# Patient Record
Sex: FEMALE | Race: BLACK OR AFRICAN AMERICAN | Employment: OTHER | URBAN - METROPOLITAN AREA
[De-identification: names, ages, dates, MRNs, and addresses within clinical notes are randomized per-mention and may not be internally consistent; named-entity substitution may affect disease eponyms.]

---

## 2021-09-02 ENCOUNTER — OFFICE VISIT (OUTPATIENT)
Dept: FAMILY MEDICINE CLINIC | Age: 66
End: 2021-09-02
Payer: MEDICARE

## 2021-09-02 VITALS
OXYGEN SATURATION: 97 % | HEIGHT: 69 IN | WEIGHT: 202 LBS | BODY MASS INDEX: 29.92 KG/M2 | DIASTOLIC BLOOD PRESSURE: 75 MMHG | SYSTOLIC BLOOD PRESSURE: 116 MMHG | TEMPERATURE: 97.5 F | HEART RATE: 75 BPM | RESPIRATION RATE: 16 BRPM

## 2021-09-02 DIAGNOSIS — Z11.59 ENCOUNTER FOR HEPATITIS C SCREENING TEST FOR LOW RISK PATIENT: ICD-10-CM

## 2021-09-02 DIAGNOSIS — E55.9 VITAMIN D DEFICIENCY: ICD-10-CM

## 2021-09-02 DIAGNOSIS — G47.33 OBSTRUCTIVE SLEEP APNEA SYNDROME: ICD-10-CM

## 2021-09-02 DIAGNOSIS — H40.9 GLAUCOMA OF BOTH EYES, UNSPECIFIED GLAUCOMA TYPE: ICD-10-CM

## 2021-09-02 DIAGNOSIS — Z23 ENCOUNTER FOR IMMUNIZATION: ICD-10-CM

## 2021-09-02 DIAGNOSIS — F41.9 ANXIETY: ICD-10-CM

## 2021-09-02 DIAGNOSIS — F13.24: ICD-10-CM

## 2021-09-02 DIAGNOSIS — F33.1 MODERATE EPISODE OF RECURRENT MAJOR DEPRESSIVE DISORDER (HCC): ICD-10-CM

## 2021-09-02 DIAGNOSIS — K63.5 POLYP OF COLON, UNSPECIFIED PART OF COLON, UNSPECIFIED TYPE: ICD-10-CM

## 2021-09-02 DIAGNOSIS — E78.00 HYPERCHOLESTEROLEMIA: ICD-10-CM

## 2021-09-02 DIAGNOSIS — I10 ESSENTIAL HYPERTENSION: Primary | ICD-10-CM

## 2021-09-02 DIAGNOSIS — F10.90 HEAVY ALCOHOL USE: ICD-10-CM

## 2021-09-02 DIAGNOSIS — Z76.89 ENCOUNTER TO ESTABLISH CARE: ICD-10-CM

## 2021-09-02 PROBLEM — E66.9 OBESITY: Status: ACTIVE | Noted: 2021-09-02

## 2021-09-02 PROCEDURE — G8431 POS CLIN DEPRES SCRN F/U DOC: HCPCS | Performed by: FAMILY MEDICINE

## 2021-09-02 PROCEDURE — G8400 PT W/DXA NO RESULTS DOC: HCPCS | Performed by: FAMILY MEDICINE

## 2021-09-02 PROCEDURE — 99204 OFFICE O/P NEW MOD 45 MIN: CPT | Performed by: FAMILY MEDICINE

## 2021-09-02 PROCEDURE — 1101F PT FALLS ASSESS-DOCD LE1/YR: CPT | Performed by: FAMILY MEDICINE

## 2021-09-02 PROCEDURE — 96127 BRIEF EMOTIONAL/BEHAV ASSMT: CPT | Performed by: FAMILY MEDICINE

## 2021-09-02 PROCEDURE — G0008 ADMIN INFLUENZA VIRUS VAC: HCPCS | Performed by: FAMILY MEDICINE

## 2021-09-02 PROCEDURE — G0009 ADMIN PNEUMOCOCCAL VACCINE: HCPCS | Performed by: FAMILY MEDICINE

## 2021-09-02 PROCEDURE — 1090F PRES/ABSN URINE INCON ASSESS: CPT | Performed by: FAMILY MEDICINE

## 2021-09-02 PROCEDURE — G8417 CALC BMI ABV UP PARAM F/U: HCPCS | Performed by: FAMILY MEDICINE

## 2021-09-02 PROCEDURE — G8536 NO DOC ELDER MAL SCRN: HCPCS | Performed by: FAMILY MEDICINE

## 2021-09-02 PROCEDURE — 90750 HZV VACC RECOMBINANT IM: CPT | Performed by: FAMILY MEDICINE

## 2021-09-02 PROCEDURE — G8427 DOCREV CUR MEDS BY ELIG CLIN: HCPCS | Performed by: FAMILY MEDICINE

## 2021-09-02 PROCEDURE — 3017F COLORECTAL CA SCREEN DOC REV: CPT | Performed by: FAMILY MEDICINE

## 2021-09-02 PROCEDURE — G0463 HOSPITAL OUTPT CLINIC VISIT: HCPCS | Performed by: FAMILY MEDICINE

## 2021-09-02 RX ORDER — LORAZEPAM 0.5 MG/1
TABLET ORAL
COMMUNITY
End: 2021-09-02 | Stop reason: SDUPTHER

## 2021-09-02 RX ORDER — LORAZEPAM 0.5 MG/1
TABLET ORAL
Qty: 30 TABLET | Refills: 1 | Status: SHIPPED | OUTPATIENT
Start: 2021-09-02 | End: 2022-01-12

## 2021-09-02 RX ORDER — ROSUVASTATIN CALCIUM 5 MG/1
TABLET, COATED ORAL
COMMUNITY
End: 2022-05-12

## 2021-09-02 RX ORDER — ACETAMINOPHEN 500 MG
TABLET ORAL
COMMUNITY

## 2021-09-02 RX ORDER — VENLAFAXINE HYDROCHLORIDE 37.5 MG/1
37.5 CAPSULE, EXTENDED RELEASE ORAL DAILY
Qty: 30 CAPSULE | Refills: 1 | Status: SHIPPED | OUTPATIENT
Start: 2021-09-02 | End: 2021-10-04

## 2021-09-02 RX ORDER — LATANOPROST 50 UG/ML
SOLUTION/ DROPS OPHTHALMIC
COMMUNITY

## 2021-09-02 RX ORDER — HYDROCHLOROTHIAZIDE 25 MG/1
TABLET ORAL
COMMUNITY
End: 2022-02-14

## 2021-09-02 RX ORDER — OMEPRAZOLE 40 MG/1
CAPSULE, DELAYED RELEASE ORAL
COMMUNITY

## 2021-09-02 RX ORDER — MULTIVIT,IRON,MINERALS/LUTEIN
TABLET ORAL
COMMUNITY

## 2021-09-02 NOTE — PROGRESS NOTES
Selma Alvarado is a 73 y.o. female    Chief Complaint   Patient presents with   [facility]     new patient, [place]. used to take lexapro for depression, which helped    Medication Refill     would like her meds refilled     -has not had a pneumonia vaccine  -had 1 shingles vaccine in 2017  -always gets flu vaccine  -had mammogram in may 2021  -has a colonoscopy scheduled 9/15/21 with Dr. Steven Rocha     1. Have you been to the ER, urgent care clinic since your last visit? Hospitalized since your last visit? she is a new patient    2. Have you seen or consulted any other health care providers outside of the [facility] since your last visit? Include any pap smears or colon screening. she is a new patient      Visit Vitals  /75 (BP 1 Location: Right upper arm, BP Patient Position: Sitting, BP Cuff Size: Large adult)   Pulse 75   Temp 97.5 °F (36.4 °C) (Temporal)   Resp 16   Ht 5' 8.5\" (1.74 m)   Wt 202 lb (91.6 kg)   SpO2 97%   BMI 30.27 kg/m²           Health Maintenance Due   Topic Date Due    Hepatitis C Screening  Never done    Lipid Screen  Never done    DTaP/Tdap/Td series (1 - Tdap) Never done    Colorectal Cancer Screening Combo  Never done    Shingrix Vaccine Age 50> (1 of 2) Never done    Breast Cancer Screen Mammogram  06/14/2017    Bone Densitometry (Dexa) Screening  Never done    Pneumococcal 65+ years (1 of 1 - PPSV23) Never done    Flu Vaccine (1) 09/01/2021    Medicare Yearly Exam  08/03/2021         Medication Reconciliation completed, changes noted.   Please  Update medication list.

## 2021-09-02 NOTE — PATIENT INSTRUCTIONS
Eye Doctor Referral:   [facility]  [facility] [address], [address] [place] [address] Street  Phone: [phone]         Anxiety Disorder: Care Instructions  Your Care Instructions     Anxiety is a normal reaction to stress. Difficult situations can cause you to have symptoms such as sweaty palms and a nervous feeling. In an anxiety disorder, the symptoms are far more severe. Constant worry, muscle tension, trouble sleeping, nausea and diarrhea, and other symptoms can make normal daily activities difficult or impossible. These symptoms may occur for no reason, and they can affect your work, school, or social life. Medicines, counseling, and self-care can all help. Follow-up care is a key part of your treatment and safety. Be sure to make and go to all appointments, and call your doctor if you are having problems. It's also a good idea to know your test results and keep a list of the medicines you take. How can you care for yourself at home? · Take medicines exactly as directed. Call your doctor if you think you are having a problem with your medicine. · Go to your counseling sessions and follow-up appointments. · Recognize and accept your anxiety. Then, when you are in a situation that makes you anxious, say to yourself, \"This is not an emergency. I feel uncomfortable, but I am not in danger. I can keep going even if I feel anxious. \"  · Be kind to your body:  ? Relieve tension with exercise or a massage. ? Get enough rest.  ? Avoid alcohol, caffeine, nicotine, and illegal drugs. They can increase your anxiety level and cause sleep problems. ? Learn and do relaxation techniques. See below for more about these techniques. · Engage your mind. Get out and do something you enjoy. Go to a funny movie, or take a walk or hike. Plan your day. Having too much or too little to do can make you anxious. · Keep a record of your symptoms.  Discuss your fears with a good friend or family member, or join a support group for people with similar problems. Talking to others sometimes relieves stress. · Get involved in social groups, or volunteer to help others. Being alone sometimes makes things seem worse than they are. · Get at least 30 minutes of exercise on most days of the week to relieve stress. Walking is a good choice. You also may want to do other activities, such as running, swimming, cycling, or playing tennis or team sports. Relaxation techniques  Do relaxation exercises 10 to 20 minutes a day. You can play soothing, relaxing music while you do them, if you wish. · Tell others in your house that you are going to do your relaxation exercises. Ask them not to disturb you. · Find a comfortable place, away from all distractions and noise. · Lie down on your back, or sit with your back straight. · Focus on your breathing. Make it slow and steady. · Breathe in through your nose. Breathe out through either your nose or mouth. · Breathe deeply, filling up the area between your navel and your rib cage. Breathe so that your belly goes up and down. · Do not hold your breath. · Breathe like this for 5 to 10 minutes. Notice the feeling of calmness throughout your whole body. As you continue to breathe slowly and deeply, relax by doing the following for another 5 to 10 minutes:  · Tighten and relax each muscle group in your body. You can begin at your toes and work your way up to your head. · Imagine your muscle groups relaxing and becoming heavy. · Empty your mind of all thoughts. · Let yourself relax more and more deeply. · Become aware of the state of calmness that surrounds you. · When your relaxation time is over, you can bring yourself back to alertness by moving your fingers and toes and then your hands and feet and then stretching and moving your entire body. Sometimes people fall asleep during relaxation, but they usually wake up shortly afterward.   · Always give yourself time to return to full alertness before you drive a car or do anything that might cause an accident if you are not fully alert. Never play a relaxation tape while you drive a car. When should you call for help? Call 911 anytime you think you may need emergency care. For example, call if:    · You feel you cannot stop from hurting yourself or someone else. Keep the numbers for these national suicide hotlines: 2-806-244-TALK (7-432.224.4185) and 6-139-MEXPJCH (8-656.477.2835). If you or someone you know talks about suicide or feeling hopeless, get help right away. Watch closely for changes in your health, and be sure to contact your doctor if:    · You have anxiety or fear that affects your life.     · You have symptoms of anxiety that are new or different from those you had before. Where can you learn more? Go to http://www.smith.com/  Enter P754 in the search box to learn more about \"Anxiety Disorder: Care Instructions. \"  Current as of: September 23, 2020               Content Version: 12.8  © 2596-7028 [employer]. Care instructions adapted under license by [employer] (which disclaims liability or warranty for this information). If you have questions about a medical condition or this instruction, always ask your healthcare professional. [address] any warranty or liability for your use of this information. 9 Ways to Cut Back on Drinking  Maybe you've found yourself drinking more alcohol than you'd prefer. If you want to cut back, here are some ideas to try. Think before you drink. Do you really want a drink, or is it just a habit? If you're used to having a drink at a certain time, try doing something else then. Look for substitutes. Find some no-alcohol drinks that you enjoy, like flavored seltzer water, tea with honey, or tonic with a slice of lime. Or try alcohol-free beer or \"virgin\" cocktails (without the alcohol). Drink more water.   Use water to quench your thirst. Drink a glass of water before you have any alcohol. Have another glass along with every drink or between drinks. Shrink your drink. For example, have a bottle of beer instead of a pint. Use a smaller glass for wine. Choose drinks with lower alcohol content (ABV%). Or use less liquor and more mixer in cocktails. Slow down. It's easy to drink quickly and without thinking about it. Pay attention, and make each drink last longer. Do the math. Total up how much you spend on alcohol each month. How much is that a year? If you cut back, what could you do with the money you save? Take a break. Choose a day or two each week when you won't drink at all. Notice how you feel on those days, physically and emotionally. How did you sleep? Do you feel better? Over time, add more break days. Count calories. Would you like to lose some weight? That can be a good motivator for cutting back. Figure out how many calories are in each drink. How many does that add up to in a day? In a week? In a month? Practice saying no. Be ready when someone offers you a drink. Try: Alfonso Aragon, I've had enough. \" Or \"Thanks, but I'm cutting back. \" Or \"No, thanks. I feel better when I drink less. \"   Current as of: December 1, 2020               Content Version: 12.8  © 2006-2021 [employer], [facility]. Care instructions adapted under license by [employer] (which disclaims liability or warranty for this information). If you have questions about a medical condition or this instruction, always ask your healthcare professional. [address] any warranty or liability for your use of this information.

## 2021-09-02 NOTE — PROGRESS NOTES
History of Present Illness:     Chief Complaint   Patient presents with   [facility] Establish Care     new patient, [place]. used to take lexapro for depression, which helped    Medication Refill     would like her meds refilled         Pt is a 73y.o. year old female    Presents to clinic to establish care. Recently moved from out of [place], [place]  Family is in the area  Needs meds refilled     Current medical conditions:  - HTN. Taking HCTZ. Always good at home.     - HLD. Taking Crestor daily.    - Anxiety/ depression. Would like referral for therapy. Drinks 2-3 drinks per day. Reports it comes in waves, coming off of a 2 week stretch of depression. Takes Lorazepam and needs a refill. Denies SI or HI. But is very angry and has thoughts of hurting people at times. Family issues - she had custody of her grandson, who is now with his father, in custody pedro with him. - Glaucoma. Has appointment to establish with eye doctor in February 2022.     - Neuropathy in feet. - CHERISE. Since losing weight, not needing CPAP. - Heavy EtOH use. Drinks about 2-3 drinks per night. Some nights she does not but most nights of the week she drinks.      HM:  - Colonoscopy scheduled with [facility] (Dr. Lossie Schilder) this month  - Mammogram done this year  - 1/2 Shingrix vaccine  - Due for Pneumonia vaccine  -  Bone density scan done previously per patient    3 most recent PHQ Screens 9/2/2021   Little interest or pleasure in doing things More than half the days   Feeling down, depressed, irritable, or hopeless More than half the days   Total Score PHQ 2 4   Trouble falling or staying asleep, or sleeping too much Not at all   Feeling tired or having little energy Several days   Poor appetite, weight loss, or overeating Several days   Feeling bad about yourself - or that you are a failure or have let yourself or your family down More than half the days   Trouble concentrating on things such as school, work, reading, or watching TV Not at all   Moving or speaking so slowly that other people could have noticed; or the opposite being so fidgety that others notice Several days   Thoughts of being better off dead, or hurting yourself in some way Several days   PHQ 9 Score 10   How difficult have these problems made it for you to do your work, take care of your home and get along with others Somewhat difficult     MAKENZIE 2/7 9/2/2021   Feeling nervous, anxious, or on edge 3   Not being able to stop or control worrying 2   Worrying too much about different things 3   Trouble relaxing 3   Being so restless that it is hard to sit still 3   Becoming easily annoyed or irritable 3   Feeling afraid as if something awful might happen 1   MAKENZIE-7 Total Score 18       I have reviewed patient's history as detailed below:    Past Medical History:   Diagnosis Date    Anxiety     Arthritis     Colon polyps     Depression     Diverticular disease     Glaucoma     Heart murmur     Hemorrhoids     Hypercholesterolemia     Hypertension     Neuropathy     Pineal gland cyst     2 cm    Sleep apnea          Past Surgical History:   Procedure Laterality Date    HX CARPAL TUNNEL RELEASE      HX CATARACT REMOVAL      HX HYSTERECTOMY      HX ORTHOPAEDIC      HX ROTATOR CUFF REPAIR           Family History   Problem Relation Age of Onset    Cancer Mother     Neuropathy Mother     Glaucoma Mother     Hypertension Mother     Cancer Father     Liver Disease Father     Neuropathy Father     Hypertension Father     Breast Cancer Sister     Hypertension Sister     Heart Disease Brother     Hypertension Brother          Social History     Socioeconomic History    Marital status:      Spouse name: Not on file    Number of children: Not on file    Years of education: Not on file    Highest education level: Not on file   Occupational History    Not on file   Tobacco Use    Smoking status: Never Smoker    Smokeless tobacco: Never Used Vaping Use    Vaping Use: Never used   Substance and Sexual Activity    Alcohol use: Yes     Comment: daily over the past 5 months. 2-3 drinks a day    Drug use: Never    Sexual activity: Not Currently     Partners: Male     Birth control/protection: None   Other Topics Concern    Not on file   Social History Narrative    Not on file     Social Determinants of Health     Financial Resource Strain:     Difficulty of Paying Living Expenses:    Food Insecurity:     Worried About Running Out of Food in the Last Year:     [address] in the Last Year:    Transportation Needs:     Lack of Transportation (Medical):      Lack of Transportation (Non-Medical):    Physical Activity:     Days of Exercise per Week:     Minutes of Exercise per Session:    Stress:     Feeling of Stress :    Social Connections:     Frequency of Communication with Friends and Family:     Frequency of Social Gatherings with Friends and Family:     Attends Cheondo[employer] Services:     Active Member of Clubs or Organizations:     Attends Club or Organization Meetings:     Marital Status:    Intimate Partner Violence:     Fear of Current or Ex-Partner:     Emotionally Abused:     Physically Abused:     Sexually Abused:          Current Outpatient Medications on File Prior to Visit   Medication Sig Dispense Refill    LORazepam (ATIVAN) 0.5 mg tablet lorazepam 0.5 mg tablet   TAKE 1 TABLET BY MOUTH EVERY DAY AS NEEDED      latanoprost (XALATAN) 0.005 % ophthalmic solution latanoprost 0.005 % eye drops   INSTILL 1 DROP INTO BOTH EYES AT BEDTIME AS DIRECTED      hydroCHLOROthiazide (HYDRODIURIL) 25 mg tablet hydrochlorothiazide 25 mg tablet   TAKE 1 TABLET BY MOUTH EVERY DAY      rosuvastatin (CRESTOR) 5 mg tablet rosuvastatin 5 mg tablet   TAKE 1 TABLET BY MOUTH EVERY DAY      omeprazole (PRILOSEC) 40 mg capsule omeprazole 40 mg capsule,delayed release   TAKE 1 CAPSULE BY MOUTH EVERY DAY      cholecalciferol (VITAMIN D3) (2,000 UNITS /50 MCG) cap capsule cholecalciferol (vitamin D3) 50 mcg (2,000 unit) capsule   TAKE 1 CAPSULE BY MOUTH EVERY DAY      multivit-min-iron-FA-lutein (Centrum Silver Women) 8 mg iron-400 mcg-300 mcg tab Take  by mouth. No current facility-administered medications on file prior to visit. Allergies: Allergies   Allergen Reactions    Lisinopril Itching    Penicillins Hives, Itching and Swelling    Sulfa (Sulfonamide Antibiotics) Itching         Review of systems:    Review of Systems   Constitutional: Negative for chills and fever. Eyes: Positive for blurred vision. Respiratory: Negative for cough and shortness of breath. Cardiovascular: Negative for chest pain, palpitations and leg swelling. Neurological: Positive for tingling. Psychiatric/Behavioral: Positive for depression and substance abuse. Negative for suicidal ideas. The patient is nervous/anxious. The patient does not have insomnia. Objective:     Vitals:    09/02/21 1332   BP: 116/75   Pulse: 75   Resp: 16   Temp: 97.5 °F (36.4 °C)   TempSrc: Temporal   SpO2: 97%   Weight: 202 lb (91.6 kg)   Height: 5' 8.5\" (1.74 m)       Physical Exam:  General appearance - alert, well appearing, and in no distress and overweight  Mental status - alert, oriented to person, place, and time, depressed mood, affect appropriate to mood  Neck - supple, no significant adenopathy, thyroid exam: thyroid is normal in size without nodules or tenderness  Chest - clear to auscultation, no wheezes, rales or rhonchi, symmetric air entry  Heart - normal rate, regular rhythm, normal S1, S2, no murmurs, rubs, clicks or gallops  Neurological - alert, oriented, normal speech, no focal findings or movement disorder noted  Extremities - peripheral pulses normal, no pedal edema, no clubbing or cyanosis      Recent Labs:  No results found for this or any previous visit (from the past 12 hour(s)).       Assessment and Plan:   Pt is a 73y.o. year old female,      ICD-10-CM ICD-9-CM    1. Essential hypertension  I10 401.9    2. Obstructive sleep apnea syndrome  G47.33 327.23    3. Hypercholesterolemia  E78.00 272.0    4. Glaucoma of both eyes, unspecified glaucoma type  H40.9 365.9    5. Moderate episode of recurrent major depressive disorder (HCC)  F33.1 296.32    6. Anxiety  F41.9 300.00    7. Polyp of colon, unspecified part of colon, unspecified type  K63.5 211.3    8. Encounter to establish care  Z76.89 V65.8        HTN, well controlled    CHERISE, no longer using CPAP since weight loss    HLD, continue Statin    Glaucoma, visit with Ophtho planned but will see if she can get in with [facility] sooner    MDD, see above for PHQ and MAKENZIE,start Effexor, she mentioned she has issues with hot flashes, so Effexor may benefit that as well. Anxiety with anxiolytic dependence, Effexor as noted above, will refill Ativan 0.5mg daily with goal of weaning to PRN use which she had done previously.  reviewed and is appropriate. Colon polyp, scheduled for colonoscopy this month    Vit D def, cont daily supplement, check labs    Heavy EtOH use,counseled on safe levels for women and importance of cutting back. Pt desires to cut back and admits she is using it to cope. Given handout for counselors in the area as well. Follow up in 4-6 weeks for follow up/ Alena Garcia MD      I have discussed the diagnosis with the patient and the intended plan as seen in the above orders. The patient has received an after-visit summary and questions were answered concerning future plans.

## 2021-09-03 LAB
25(OH)D3 SERPL-MCNC: 47.2 NG/ML (ref 30–100)
ALBUMIN SERPL-MCNC: 4.1 G/DL (ref 3.5–5)
ALBUMIN/GLOB SERPL: 1.2 {RATIO} (ref 1.1–2.2)
ALP SERPL-CCNC: 61 U/L (ref 45–117)
ALT SERPL-CCNC: 45 U/L (ref 12–78)
ANION GAP SERPL CALC-SCNC: 1 MMOL/L (ref 5–15)
AST SERPL-CCNC: 27 U/L (ref 15–37)
BILIRUB SERPL-MCNC: 0.3 MG/DL (ref 0.2–1)
BUN SERPL-MCNC: 12 MG/DL (ref 6–20)
BUN/CREAT SERPL: 14 (ref 12–20)
CALCIUM SERPL-MCNC: 10.1 MG/DL (ref 8.5–10.1)
CHLORIDE SERPL-SCNC: 107 MMOL/L (ref 97–108)
CHOLEST SERPL-MCNC: 205 MG/DL
CO2 SERPL-SCNC: 32 MMOL/L (ref 21–32)
CREAT SERPL-MCNC: 0.86 MG/DL (ref 0.55–1.02)
ERYTHROCYTE [DISTWIDTH] IN BLOOD BY AUTOMATED COUNT: 14.6 % (ref 11.5–14.5)
GLOBULIN SER CALC-MCNC: 3.3 G/DL (ref 2–4)
GLUCOSE SERPL-MCNC: 98 MG/DL (ref 65–100)
HCT VFR BLD AUTO: 39.6 % (ref 35–47)
HCV AB SERPL QL IA: NONREACTIVE
HDLC SERPL-MCNC: 74 MG/DL
HDLC SERPL: 2.8 {RATIO} (ref 0–5)
HGB BLD-MCNC: 12 G/DL (ref 11.5–16)
LDLC SERPL CALC-MCNC: 111.4 MG/DL (ref 0–100)
MCH RBC QN AUTO: 25.2 PG (ref 26–34)
MCHC RBC AUTO-ENTMCNC: 30.3 G/DL (ref 30–36.5)
MCV RBC AUTO: 83 FL (ref 80–99)
NRBC # BLD: 0 K/UL (ref 0–0.01)
NRBC BLD-RTO: 0 PER 100 WBC
PLATELET # BLD AUTO: 326 K/UL (ref 150–400)
PMV BLD AUTO: 10.4 FL (ref 8.9–12.9)
POTASSIUM SERPL-SCNC: 3.4 MMOL/L (ref 3.5–5.1)
PROT SERPL-MCNC: 7.4 G/DL (ref 6.4–8.2)
RBC # BLD AUTO: 4.77 M/UL (ref 3.8–5.2)
SODIUM SERPL-SCNC: 140 MMOL/L (ref 136–145)
TRIGL SERPL-MCNC: 98 MG/DL (ref ?–150)
TSH SERPL DL<=0.05 MIU/L-ACNC: 1 UIU/ML (ref 0.36–3.74)
VLDLC SERPL CALC-MCNC: 19.6 MG/DL
WBC # BLD AUTO: 5.2 K/UL (ref 3.6–11)

## 2021-10-03 DIAGNOSIS — F41.9 ANXIETY: ICD-10-CM

## 2021-10-03 DIAGNOSIS — F33.1 MODERATE EPISODE OF RECURRENT MAJOR DEPRESSIVE DISORDER (HCC): ICD-10-CM

## 2021-10-04 RX ORDER — VENLAFAXINE HYDROCHLORIDE 37.5 MG/1
37.5 CAPSULE, EXTENDED RELEASE ORAL DAILY
Qty: 30 CAPSULE | Refills: 1 | Status: SHIPPED | OUTPATIENT
Start: 2021-10-04 | End: 2021-10-31 | Stop reason: SDUPTHER

## 2021-10-21 ENCOUNTER — OFFICE VISIT (OUTPATIENT)
Dept: FAMILY MEDICINE CLINIC | Age: 66
End: 2021-10-21
Payer: MEDICARE

## 2021-10-21 VITALS
TEMPERATURE: 97.5 F | SYSTOLIC BLOOD PRESSURE: 129 MMHG | DIASTOLIC BLOOD PRESSURE: 84 MMHG | BODY MASS INDEX: 30.45 KG/M2 | HEIGHT: 69 IN | OXYGEN SATURATION: 98 % | HEART RATE: 67 BPM | WEIGHT: 205.6 LBS | RESPIRATION RATE: 16 BRPM

## 2021-10-21 DIAGNOSIS — Z23 ENCOUNTER FOR IMMUNIZATION: ICD-10-CM

## 2021-10-21 DIAGNOSIS — Z71.89 ADVANCED DIRECTIVES, COUNSELING/DISCUSSION: ICD-10-CM

## 2021-10-21 DIAGNOSIS — Z78.0 POSTMENOPAUSAL STATE: ICD-10-CM

## 2021-10-21 DIAGNOSIS — E34.8 CYST OF PINEAL GLAND: ICD-10-CM

## 2021-10-21 DIAGNOSIS — Z00.00 WELCOME TO MEDICARE PREVENTIVE VISIT: Primary | ICD-10-CM

## 2021-10-21 PROCEDURE — G0403 EKG FOR INITIAL PREVENT EXAM: HCPCS | Performed by: FAMILY MEDICINE

## 2021-10-21 PROCEDURE — G0402 INITIAL PREVENTIVE EXAM: HCPCS | Performed by: FAMILY MEDICINE

## 2021-10-21 PROCEDURE — 90715 TDAP VACCINE 7 YRS/> IM: CPT | Performed by: FAMILY MEDICINE

## 2021-10-21 PROCEDURE — G8417 CALC BMI ABV UP PARAM F/U: HCPCS | Performed by: FAMILY MEDICINE

## 2021-10-21 PROCEDURE — 90471 IMMUNIZATION ADMIN: CPT | Performed by: FAMILY MEDICINE

## 2021-10-21 PROCEDURE — G8536 NO DOC ELDER MAL SCRN: HCPCS | Performed by: FAMILY MEDICINE

## 2021-10-21 PROCEDURE — G8427 DOCREV CUR MEDS BY ELIG CLIN: HCPCS | Performed by: FAMILY MEDICINE

## 2021-10-21 PROCEDURE — 1090F PRES/ABSN URINE INCON ASSESS: CPT | Performed by: FAMILY MEDICINE

## 2021-10-21 PROCEDURE — G9717 DOC PT DX DEP/BP F/U NT REQ: HCPCS | Performed by: FAMILY MEDICINE

## 2021-10-21 NOTE — PATIENT INSTRUCTIONS

## 2021-10-21 NOTE — PROGRESS NOTES
Lynne Pérez is a 73 y.o. female    General Health Questions   -During the past 4 weeks:   -how would you rate your health in general? Good   -how often have you been bothered by feeling dizzy when standing up? Not at all   -how much have you been bothered by bodily pain? mildly   -Have you noticed any hearing difficulties? Yes - recently had hearing test   -has your physical and emotional health limited your social activities with family or friends? no    Emotional Health Questions   -Do you have a history of depression, anxiety, or emotional problems? yes  -Over the past 2 weeks, have you felt down, depressed or hopeless? no  -Over the past 2 weeks, have you felt little interest or pleasure in doing things? no    Health Habits   Please describe your diet habits: usually skip breakfast, balanced lunch, balanced dinner. Metamucil in the morning & fiber bar before bed. 2-3 glasses of water/day  Do you get 5 servings of fruits or vegetables daily? no  Do you exercise regularly? yes    Activities of Daily Living and Functional Status   -Do you need help with eating, walking, dressing, bathing, toileting, the phone, transportation, shopping, preparing meals, housework, laundry, medications or managing money? no  -In the past four weeks, was someone available to help you if you needed and wanted help with anything? yes  -Are you confident are you that you can control and manage most of your health problems? yes  -Have you been given information to help you keep track of your medications? yes  -How often do you have trouble taking your medications as prescribed? never    Fall Risk and Home Safety   Have you fallen 2 or more times in the past year? no  Does your home have rugs in the hallways? no, Do you have grab bars in the bathrooms? yes, Does your home have handrails on the stairs? yes, Do you have adequate lighting in your home? yes  Do you have smoke detectors and check them regularly?  yes  Do you have difficulties driving a car/vehicle? no  Do you always fasten your seat belt when you are in a car? yes    Chief Complaint   Patient presents with    Welcome To Medicare     had colon[facility] 9[facility]15/21    Follow-up     doing well on effexor. taking less of ativan        1. Have you been to the ER, urgent care clinic since your last visit? Hospitalized since your last visit? No    2. Have you seen or consulted any other health care providers outside of the [address] [place] [address] [facility] since your last visit? Include any pap smears or colon screening. Yes - had colonoscopy done by GI in sept      Visit Vitals  /84 (BP 1 Location: Left upper arm, BP Patient Position: Sitting, BP Cuff Size: Adult)   Pulse 67   Temp 97.5 °F (36.4 °C) (Temporal)   Resp 16   Ht 5' 8.5\" (1.74 m)   Wt 205 lb 9.6 oz (93.3 kg)   SpO2 98%   BMI 30.81 kg/m²           Health Maintenance Due   Topic Date Due    Cervical cancer screen  Never done    Breast Cancer Screen Mammogram  06/14/2017    Bone Densitometry (Dexa) Screening  Never done         Medication Reconciliation completed, changes noted.   Please  Update medication list.

## 2021-10-21 NOTE — PROGRESS NOTES
Years ago had MRI for stroke. Work up for dragging of foot. 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 Only neurologic complaint is tinging/ burning in her feet bilaterally. Last MRI years ago at outside facility. Will re-eval with MRI. This is a \"Welcome to [employer]"  Initial Preventive Physical Examination (IPPE) providing Personalized Prevention Plan Services (Performed in the first 12 months of enrollment)    I have reviewed the patient's medical history in detail and updated the computerized patient record. Assessment/Plan   Education and counseling provided:  Are appropriate based on today's review and evaluation  End-of-Life planning (with patient's consent)  Bone mass measurement (DEXA)    1. Welcome to Medicare preventive visit  -     AMB POC EKG ROUTINE W/ 12 LEADS, SCREEN ()  2. Encounter for immunization  -     TETANUS, DIPHTHERIA TOXOIDS AND ACELLULAR PERTUSSIS VACCINE (TDAP), IN INDIVIDS. >=7, IM  -     SC IMMUNIZ ADMIN,1 SINGLE/COMB VAC/TOXOID  3. Cyst of pineal gland  4. Advanced directives, counseling/discussion  -     FULL CODE  5. Postmenopausal state  -     DEXA BONE DENSITY STUDY AXIAL;  Future       Depression Risk Screen     3 most recent PHQ Screens 9/2/2021   Little interest or pleasure in doing things More than half the days   Feeling down, depressed, irritable, or hopeless More than half the days   Total Score PHQ 2 4   Trouble falling or staying asleep, or sleeping too much Not at all   Feeling tired or having little energy Several days   Poor appetite, weight loss, or overeating Several days   Feeling bad about yourself - or that you are a failure or have let yourself or your family down More than half the days   Trouble concentrating on things such as school, work, reading, or watching TV Not at all   Moving or speaking so slowly that other people could have noticed; or the opposite being so fidgety that others notice Several days   Thoughts of being better off dead, or hurting yourself in some way Several days   PHQ 9 Score 10   How difficult have these problems made it for you to do your work, take care of your home and get along with others Somewhat difficult       Alcohol Risk Screen    Do you average more than 1 drink per night or more than 7 drinks a week:  No    On any one occasion in the past three months have you have had more than 3 drinks containing alcohol:  No          Functional Ability and Level of Safety    Diet: No special diet      Hearing: Had hearing testing done with mildhearing loss in left ear      Vision Screening:  Vision is good. No exam data present      Activities of Daily Living: The home contains: handrails and grab bars  Patient does total self care      Ambulation: with no difficulty      Exercise level: moderately active     Fall Risk Screen:  Fall Risk Assessment, last 12 mths 9/2/2021   Able to walk? Yes   Fall in past 12 months? 0   Do you feel unsteady? 0   Are you worried about falling 0   Is the gait abnormal? 0   Number of falls in past 12 months 0   Fall with injury? 0      Abuse Screen:  Patient is not abused       Screening EKG   EKG order placed: Yes    End of Life Planning   Advanced care planning directives were discussed with the patient and /or family/caregiver.      Health Maintenance Due     Health Maintenance Due   Topic Date Due    DTaP/Tdap/Td series (1 - Tdap) Never done    Cervical cancer screen  Never done    Breast Cancer Screen Mammogram  06/14/2017    Bone Densitometry (Dexa) Screening  Never done       Patient Care Team   Patient Care Team:  Vivek Monson MD as PCP - General (Family Medicine)  Vivek Monson MD as PCP - [facility] Empaneled Provider    History     Past Medical History:   Diagnosis Date    Anxiety     Arthritis     Colon polyps     Depression     Diverticular disease     Glaucoma     Heart murmur     Hemorrhoids     Hypercholesterolemia     Hypertension     Neuropathy     Pineal gland cyst     2 cm    Sleep apnea       Past Surgical History:   Procedure Laterality Date    HX CARPAL TUNNEL RELEASE      HX CATARACT REMOVAL      HX COLONOSCOPY      HX HYSTERECTOMY      HX ORTHOPAEDIC      HX ROTATOR CUFF REPAIR       Current Outpatient Medications   Medication Sig Dispense Refill    venlafaxine-SR (EFFEXOR-XR) 37.5 mg capsule TAKE 1 CAPSULE BY MOUTH DAILY 30 Capsule 1    latanoprost (XALATAN) 0.005 % ophthalmic solution latanoprost 0.005 % eye drops   INSTILL 1 DROP INTO BOTH EYES AT BEDTIME AS DIRECTED      hydroCHLOROthiazide (HYDRODIURIL) 25 mg tablet hydrochlorothiazide 25 mg tablet   TAKE 1 TABLET BY MOUTH EVERY DAY      rosuvastatin (CRESTOR) 5 mg tablet rosuvastatin 5 mg tablet   TAKE 1 TABLET BY MOUTH EVERY DAY      omeprazole (PRILOSEC) 40 mg capsule omeprazole 40 mg capsule,delayed release   TAKE 1 CAPSULE BY MOUTH EVERY DAY      cholecalciferol (VITAMIN D3) (2,000 UNITS /50 MCG) cap capsule cholecalciferol (vitamin D3) 50 mcg (2,000 unit) capsule   TAKE 1 CAPSULE BY MOUTH EVERY DAY      multivit-min-iron-FA-lutein (Centrum Silver Women) 8 mg iron-400 mcg-300 mcg tab Take  by mouth.       LORazepam (ATIVAN) 0.5 mg tablet lorazepam 0.5 mg tablet  TAKE 1 TABLET BY MOUTH EVERY DAY AS NEEDED 30 Tablet 1     Allergies   Allergen Reactions    Lisinopril Itching     Other reaction(s): RASH, ITCHING    Penicillins Hives, Itching and Swelling     Other reaction(s): RASH    Sulfa (Sulfonamide Antibiotics) Itching     Other reaction(s): RASH, ITCHING       Family History   Problem Relation Age of Onset    Cancer Mother     Neuropathy Mother     Glaucoma Mother     Hypertension Mother     Cancer Father     Liver Disease Father     Neuropathy Father     Hypertension Father     Breast Cancer Sister     Hypertension Sister     Heart Disease Brother     Hypertension Brother      Social History     Tobacco Use    Smoking status: Never Smoker    Smokeless tobacco: Never Used Substance Use Topics    Alcohol use: Yes     Comment: daily over the past 5 months.  2-3 drinks a day       Symone García MD

## 2021-10-31 DIAGNOSIS — F41.9 ANXIETY: ICD-10-CM

## 2021-10-31 DIAGNOSIS — F33.1 MODERATE EPISODE OF RECURRENT MAJOR DEPRESSIVE DISORDER (HCC): ICD-10-CM

## 2021-11-02 RX ORDER — VENLAFAXINE HYDROCHLORIDE 37.5 MG/1
37.5 CAPSULE, EXTENDED RELEASE ORAL DAILY
Qty: 30 CAPSULE | Refills: 1 | Status: SHIPPED | OUTPATIENT
Start: 2021-11-02 | End: 2022-01-04 | Stop reason: SDUPTHER

## 2021-12-07 PROBLEM — E34.8 PINEAL GLAND CYST: Status: ACTIVE | Noted: 2021-12-07

## 2022-01-03 ENCOUNTER — PATIENT MESSAGE (OUTPATIENT)
Dept: FAMILY MEDICINE CLINIC | Age: 67
End: 2022-01-03

## 2022-01-03 DIAGNOSIS — F41.9 ANXIETY: ICD-10-CM

## 2022-01-03 DIAGNOSIS — F33.1 MODERATE EPISODE OF RECURRENT MAJOR DEPRESSIVE DISORDER (HCC): ICD-10-CM

## 2022-01-04 DIAGNOSIS — F41.9 ANXIETY: ICD-10-CM

## 2022-01-04 DIAGNOSIS — F33.1 MODERATE EPISODE OF RECURRENT MAJOR DEPRESSIVE DISORDER (HCC): ICD-10-CM

## 2022-01-04 RX ORDER — VENLAFAXINE HYDROCHLORIDE 37.5 MG/1
37.5 CAPSULE, EXTENDED RELEASE ORAL DAILY
Qty: 30 CAPSULE | Refills: 1 | Status: CANCELLED | OUTPATIENT
Start: 2022-01-04

## 2022-01-04 RX ORDER — VENLAFAXINE HYDROCHLORIDE 37.5 MG/1
37.5 CAPSULE, EXTENDED RELEASE ORAL DAILY
Qty: 30 CAPSULE | Refills: 1 | Status: SHIPPED | OUTPATIENT
Start: 2022-01-04 | End: 2022-01-27 | Stop reason: SDUPTHER

## 2022-01-04 NOTE — TELEPHONE ENCOUNTER
From: Claudio Simons  To: Rocio Villalobos MD  Sent: 1/3/2022 4:01 PM EST  Subject: Rx refills while out of town    [facility] Dr. Alina Ballard. I am in [place] for the month of January and am having trouble refilling 2 rxs (Venlafaxine & Lorazapam). Our nearest pharmacy is below. [place] [id number]  [phone]    Thank you.

## 2022-01-11 DIAGNOSIS — F41.9 ANXIETY: ICD-10-CM

## 2022-01-11 DIAGNOSIS — F33.1 MODERATE EPISODE OF RECURRENT MAJOR DEPRESSIVE DISORDER (HCC): ICD-10-CM

## 2022-01-11 DIAGNOSIS — F13.24: ICD-10-CM

## 2022-01-11 RX ORDER — LORAZEPAM 0.5 MG/1
TABLET ORAL
Qty: 30 TABLET | Refills: 1 | OUTPATIENT
Start: 2022-01-11

## 2022-01-11 NOTE — TELEPHONE ENCOUNTER
----- Message from Rubi Tamez sent at 1/3/2022  4:13 PM EST -----  Subject: Refill Request    QUESTIONS  Name of Medication? LORazepam (ATIVAN) 0.5 mg tablet  Patient-reported dosage and instructions? 05 as needed  How many days do you have left? 2  [facility]? [id number]  Pharmacy phone number (if available)? [phone]  Additional Information for Provider? Patient is out of town. Please send   to this pharmacy. [address] [facility] in [place]  ---------------------------------------------------------------------------  --------------,  Name of Medication? venlafaxine-SR (EFFEXOR-XR) 37.5 mg capsule  Patient-reported dosage and instructions? 37.5 mg 1x daily  How many days do you have left? 3  Preferred [facility]acy? [id number]  Pharmacy phone number (if available)? [phone]  Additional Information for Provider? Patient is out of town please send to   [address] [facility] in [place]  ---------------------------------------------------------------------------  --------------  [address]  What is the best way for the office to contact you? OK to leave message on   voicemail  Preferred Call Back Phone Number?  [phone]

## 2022-01-12 DIAGNOSIS — F13.24: ICD-10-CM

## 2022-01-12 DIAGNOSIS — F41.9 ANXIETY: ICD-10-CM

## 2022-01-12 RX ORDER — LORAZEPAM 0.5 MG/1
TABLET ORAL
Qty: 30 TABLET | Refills: 0 | Status: SHIPPED | OUTPATIENT
Start: 2022-01-12 | End: 2022-03-03 | Stop reason: SDUPTHER

## 2022-01-12 NOTE — TELEPHONE ENCOUNTER
Reviewed PDMP. Appropriate. Will fill benzo to local pharmacy. Declined request to fill at [facility] as it is outside of [place].

## 2022-01-27 ENCOUNTER — PATIENT MESSAGE (OUTPATIENT)
Dept: FAMILY MEDICINE CLINIC | Age: 67
End: 2022-01-27

## 2022-01-27 DIAGNOSIS — F41.9 ANXIETY: ICD-10-CM

## 2022-01-27 DIAGNOSIS — F33.1 MODERATE EPISODE OF RECURRENT MAJOR DEPRESSIVE DISORDER (HCC): ICD-10-CM

## 2022-01-27 RX ORDER — VENLAFAXINE HYDROCHLORIDE 37.5 MG/1
37.5 CAPSULE, EXTENDED RELEASE ORAL DAILY
Qty: 30 CAPSULE | Refills: 0 | Status: SHIPPED | OUTPATIENT
Start: 2022-01-27 | End: 2022-03-02 | Stop reason: SDUPTHER

## 2022-01-27 NOTE — TELEPHONE ENCOUNTER
From: Lorne Topete  Sent: 1/27/2022 1:18 PM EST  To: Gloria Car LPN  Subject: prescription refill    Just in case, can you transfer the refill to:  [address]. [facility]  [phone]     Thanks again.

## 2022-02-14 RX ORDER — HYDROCHLOROTHIAZIDE 25 MG/1
TABLET ORAL
Qty: 90 TABLET | Refills: 3 | Status: SHIPPED | OUTPATIENT
Start: 2022-02-14 | End: 2022-03-03 | Stop reason: SDUPTHER

## 2022-03-02 DIAGNOSIS — F33.1 MODERATE EPISODE OF RECURRENT MAJOR DEPRESSIVE DISORDER (HCC): ICD-10-CM

## 2022-03-02 DIAGNOSIS — F41.9 ANXIETY: ICD-10-CM

## 2022-03-02 RX ORDER — VENLAFAXINE HYDROCHLORIDE 37.5 MG/1
37.5 CAPSULE, EXTENDED RELEASE ORAL DAILY
Qty: 30 CAPSULE | Refills: 2 | Status: SHIPPED | OUTPATIENT
Start: 2022-03-02 | End: 2022-03-03 | Stop reason: SDUPTHER

## 2022-03-03 DIAGNOSIS — F33.1 MODERATE EPISODE OF RECURRENT MAJOR DEPRESSIVE DISORDER (HCC): ICD-10-CM

## 2022-03-03 DIAGNOSIS — F41.9 ANXIETY: ICD-10-CM

## 2022-03-03 DIAGNOSIS — F13.24: ICD-10-CM

## 2022-03-03 RX ORDER — VENLAFAXINE HYDROCHLORIDE 37.5 MG/1
37.5 CAPSULE, EXTENDED RELEASE ORAL DAILY
Qty: 30 CAPSULE | Refills: 2 | Status: SHIPPED | OUTPATIENT
Start: 2022-03-03 | End: 2022-03-14

## 2022-03-03 RX ORDER — LORAZEPAM 0.5 MG/1
TABLET ORAL
Qty: 30 TABLET | Refills: 0 | Status: SHIPPED | OUTPATIENT
Start: 2022-03-03 | End: 2022-07-13 | Stop reason: SDUPTHER

## 2022-03-03 RX ORDER — HYDROCHLOROTHIAZIDE 25 MG/1
25 TABLET ORAL DAILY
Qty: 90 TABLET | Refills: 3 | Status: SHIPPED | OUTPATIENT
Start: 2022-03-03

## 2022-03-03 NOTE — TELEPHONE ENCOUNTER
Reviewed chart and PDMP. Filled Ativan for 30 day. Pt will need visit for additional fills of medication. Has not yet followed up for her anxiety since starting Effexor.

## 2022-03-19 PROBLEM — E66.9 OBESITY: Status: ACTIVE | Noted: 2021-09-02

## 2022-03-20 PROBLEM — E34.8 PINEAL GLAND CYST: Status: ACTIVE | Noted: 2021-12-07

## 2022-03-23 ENCOUNTER — OFFICE VISIT (OUTPATIENT)
Dept: FAMILY MEDICINE CLINIC | Age: 67
End: 2022-03-23
Payer: MEDICARE

## 2022-03-23 VITALS
SYSTOLIC BLOOD PRESSURE: 112 MMHG | WEIGHT: 208 LBS | OXYGEN SATURATION: 98 % | HEART RATE: 73 BPM | TEMPERATURE: 97.5 F | HEIGHT: 69 IN | BODY MASS INDEX: 30.81 KG/M2 | DIASTOLIC BLOOD PRESSURE: 74 MMHG | RESPIRATION RATE: 16 BRPM

## 2022-03-23 DIAGNOSIS — R09.81 NASAL CONGESTION: ICD-10-CM

## 2022-03-23 DIAGNOSIS — Z23 ENCOUNTER FOR IMMUNIZATION: ICD-10-CM

## 2022-03-23 DIAGNOSIS — F41.9 ANXIETY: Primary | ICD-10-CM

## 2022-03-23 DIAGNOSIS — I10 PRIMARY HYPERTENSION: ICD-10-CM

## 2022-03-23 DIAGNOSIS — E87.6 HYPOKALEMIA: ICD-10-CM

## 2022-03-23 PROCEDURE — 90750 HZV VACC RECOMBINANT IM: CPT | Performed by: FAMILY MEDICINE

## 2022-03-23 PROCEDURE — G8754 DIAS BP LESS 90: HCPCS | Performed by: FAMILY MEDICINE

## 2022-03-23 PROCEDURE — G8752 SYS BP LESS 140: HCPCS | Performed by: FAMILY MEDICINE

## 2022-03-23 PROCEDURE — G8417 CALC BMI ABV UP PARAM F/U: HCPCS | Performed by: FAMILY MEDICINE

## 2022-03-23 PROCEDURE — G8536 NO DOC ELDER MAL SCRN: HCPCS | Performed by: FAMILY MEDICINE

## 2022-03-23 PROCEDURE — 99213 OFFICE O/P EST LOW 20 MIN: CPT | Performed by: FAMILY MEDICINE

## 2022-03-23 PROCEDURE — 1090F PRES/ABSN URINE INCON ASSESS: CPT | Performed by: FAMILY MEDICINE

## 2022-03-23 PROCEDURE — G0463 HOSPITAL OUTPT CLINIC VISIT: HCPCS | Performed by: FAMILY MEDICINE

## 2022-03-23 PROCEDURE — G9717 DOC PT DX DEP/BP F/U NT REQ: HCPCS | Performed by: FAMILY MEDICINE

## 2022-03-23 PROCEDURE — G8427 DOCREV CUR MEDS BY ELIG CLIN: HCPCS | Performed by: FAMILY MEDICINE

## 2022-03-23 PROCEDURE — 3017F COLORECTAL CA SCREEN DOC REV: CPT | Performed by: FAMILY MEDICINE

## 2022-03-23 PROCEDURE — 1101F PT FALLS ASSESS-DOCD LE1/YR: CPT | Performed by: FAMILY MEDICINE

## 2022-03-23 PROCEDURE — 90471 IMMUNIZATION ADMIN: CPT | Performed by: FAMILY MEDICINE

## 2022-03-23 PROCEDURE — G8400 PT W/DXA NO RESULTS DOC: HCPCS | Performed by: FAMILY MEDICINE

## 2022-03-23 RX ORDER — FLUTICASONE PROPIONATE 50 MCG
2 SPRAY, SUSPENSION (ML) NASAL DAILY
Qty: 1 EACH | Refills: 3 | Status: SHIPPED | OUTPATIENT
Start: 2022-03-23

## 2022-03-23 RX ORDER — GUAIFENESIN 100 MG/5ML
81 LIQUID (ML) ORAL DAILY
COMMUNITY

## 2022-03-23 NOTE — PROGRESS NOTES
Elie Siddiqi is a 73 y.o. female    Chief Complaint   Patient presents with    Anxiety     feels like she is doing better. she isnt taking her ativan on a regular    Medication Refill     may need a refill on ativan through her local pharmacy     Last two MAKENZIE 7s:  MAKENZIE 2/7 3/23/2022 9/2/2021   Feeling nervous, anxious, or on edge 1 3   Not being able to stop or control worrying 0 2   Worrying too much about different things 0 3   Trouble relaxing 0 3   Being so restless that it is hard to sit still 0 3   Becoming easily annoyed or irritable 0 3   Feeling afraid as if something awful might happen 0 1   MAKENZIE-7 Total Score 1 18       1. \"Have you been to the ER, urgent care clinic since your last visit? Hospitalized since your last visit? \" No    2. \"Have you seen or consulted any other health care providers outside of the [address] [place] [address] since your last visit? \" No     3. For patients aged 39-70: Has the patient had a colonoscopy / FIT/ Cologuard? Yes - no Care Gap present      If the patient is female:    4. For patients aged 41-77: Has the patient had a mammogram within the past 2 years? Yes - Care Gap present. Most recent result on file      5. For patients aged 21-65: Has the patient had a pap smear? No    Vitals:    03/23/22 1432   BP: 112/74   Pulse: 73   Temp: 97.5 °F (36.4 °C)   TempSrc: Temporal   Resp: 16   Height: 5' 8.5\" (1.74 m)   Weight: 208 lb (94.3 kg)   SpO2: 98%             Health Maintenance Due   Topic Date Due    Cervical cancer screen  Never done    Breast Cancer Screen Mammogram  06/14/2017    Bone Densitometry (Dexa) Screening  Never done    COVID-19 Vaccine (3 - Booster for Moderna series) 09/14/2021    Shingrix Vaccine Age 50> (2 of 2) 10/28/2021         Medication Reconciliation completed, changes noted.   Please update medication list.

## 2022-03-23 NOTE — PROGRESS NOTES
History of Present Illness:     Chief Complaint   Patient presents with    Anxiety     feels like she is doing better. she isnt taking her ativan on a regular    Medication Refill     may need a refill on ativan through her local pharmacy       Shira Platt is a 73 y.o. female     Follow up visit    Anxiety   Improving  Not taking Ativan regularly, 30 day supply lasting 2 + mo  Effexor is working well   Thinks it is also helping with her hot flashes    Since visit, older sister dx with colon cancer and heart attack    Doing very well with her drinking  Cut back to 1 glass of wine per night, occasional gin  Went 21 days without drinking    PMH (REVIEWED):  Past Medical History:   Diagnosis Date    Anxiety     Arthritis     Colon polyps     Depression     Diverticular disease     Glaucoma     Heart murmur     Hemorrhoids     Hypercholesterolemia     Hypertension     Neuropathy     Pineal gland cyst     2 cm    Sleep apnea        Current Medications/Allergies (REVIEWED):     Current Outpatient Medications on File Prior to Visit   Medication Sig Dispense Refill    aspirin 81 mg chewable tablet Take 81 mg by mouth daily.  POTASSIUM PO Take  by mouth.  venlafaxine-SR (EFFEXOR-XR) 37.5 mg capsule TAKE 1 CAPSULE BY MOUTH  DAILY 90 Capsule 3    LORazepam (ATIVAN) 0.5 mg tablet TAKE 1 TABLET BY MOUTH EVERY DAY AS NEEDED 30 Tablet 0    hydroCHLOROthiazide (HYDRODIURIL) 25 mg tablet Take 1 Tablet by mouth daily.  90 Tablet 3    latanoprost (XALATAN) 0.005 % ophthalmic solution latanoprost 0.005 % eye drops   INSTILL 1 DROP INTO BOTH EYES AT BEDTIME AS DIRECTED      rosuvastatin (CRESTOR) 5 mg tablet rosuvastatin 5 mg tablet   TAKE 1 TABLET BY MOUTH EVERY DAY      omeprazole (PRILOSEC) 40 mg capsule omeprazole 40 mg capsule,delayed release   TAKE 1 CAPSULE BY MOUTH EVERY DAY      cholecalciferol (VITAMIN D3) (2,000 UNITS /50 MCG) cap capsule cholecalciferol (vitamin D3) 50 mcg (2,000 unit) capsule TAKE 1 CAPSULE BY MOUTH EVERY DAY      multivit-min-iron-FA-lutein (Centrum Silver Women) 8 mg iron-400 mcg-300 mcg tab Take  by mouth. No current facility-administered medications on file prior to visit. Allergies   Allergen Reactions    Lisinopril Itching     Other reaction(s): RASH, ITCHING    Penicillins Hives, Itching and Swelling     Other reaction(s): RASH    Sulfa (Sulfonamide Antibiotics) Itching     Other reaction(s): RASH, ITCHING         Review of Systems:     Review of Systems   Constitutional: Negative for chills and fever. Respiratory: Negative for cough and shortness of breath. Cardiovascular: Negative for chest pain, palpitations and leg swelling. Gastrointestinal: Positive for blood in stool. Negative for abdominal pain and constipation. Psychiatric/Behavioral: Negative for depression and suicidal ideas. The patient is not nervous/anxious and does not have insomnia. Objective:     Vitals:    03/23/22 1432   BP: 112/74   Pulse: 73   Resp: 16   Temp: 97.5 °F (36.4 °C)   TempSrc: Temporal   SpO2: 98%   Weight: 208 lb (94.3 kg)   Height: 5' 8.5\" (1.74 m)       Physical Exam:  General appearance - alert, well appearing, and in no distress and overweight  Mental status - alert, oriented to person, place, and time, normal mood, behavior, speech, dress, motor activity, and thought processes  Chest - clear to auscultation, no wheezes, rales or rhonchi, symmetric air entry  Heart - normal rate, regular rhythm, normal S1, S2, no murmurs, rubs, clicks or gallops  Neurological - alert, oriented, normal speech, no focal findings or movement disorder noted    Recent Labs:  No results found for this or any previous visit (from the past 12 hour(s)). Assessment and Plan:       ICD-10-CM ICD-9-CM    1. Anxiety  F41.9 300.00    2. Primary hypertension  C57 051.7 METABOLIC PANEL, BASIC   3.  Hypokalemia  E87.6 276.8          Anxiety  Doing very well  GAD7: 1 (3/23/2022)    HTN  BP well controlled  Continue current meds  Check A1c    Hypokalemia  Checking BMP    Nasal congestion   Recommended Flonase daily    Follow up: 6 months for wellness visit    Van Terrell MD    We discussed the expected course, resolution and complications of the diagnosis(es) in detail. Medication risks, benefits, costs, interactions, and alternatives were discussed as indicated. I advised her to contact the office if her condition worsens, changes or fails to improve as anticipated. She expressed understanding with the diagnosis(es) and plan.

## 2022-03-24 LAB
ANION GAP SERPL CALC-SCNC: 2 MMOL/L (ref 5–15)
BUN SERPL-MCNC: 18 MG/DL (ref 6–20)
BUN/CREAT SERPL: 18 (ref 12–20)
CALCIUM SERPL-MCNC: 10.2 MG/DL (ref 8.5–10.1)
CHLORIDE SERPL-SCNC: 103 MMOL/L (ref 97–108)
CO2 SERPL-SCNC: 32 MMOL/L (ref 21–32)
CREAT SERPL-MCNC: 0.98 MG/DL (ref 0.55–1.02)
GLUCOSE SERPL-MCNC: 78 MG/DL (ref 65–100)
POTASSIUM SERPL-SCNC: 3.7 MMOL/L (ref 3.5–5.1)
SODIUM SERPL-SCNC: 137 MMOL/L (ref 136–145)

## 2022-06-10 ENCOUNTER — PATIENT MESSAGE (OUTPATIENT)
Dept: FAMILY MEDICINE CLINIC | Age: 67
End: 2022-06-10

## 2022-06-10 DIAGNOSIS — F41.9 ANXIETY: ICD-10-CM

## 2022-06-10 DIAGNOSIS — F33.1 MODERATE EPISODE OF RECURRENT MAJOR DEPRESSIVE DISORDER (HCC): ICD-10-CM

## 2022-06-13 RX ORDER — VENLAFAXINE HYDROCHLORIDE 37.5 MG/1
75 CAPSULE, EXTENDED RELEASE ORAL DAILY
Qty: 90 CAPSULE | Refills: 3
Start: 2022-06-13 | End: 2022-08-18

## 2022-06-13 NOTE — TELEPHONE ENCOUNTER
From: Nyasia Levin  To: Detra Mcburney, MD  Sent: 6/10/2022 2:05 PM EDT  Subject: increasing Effexor dose    Hi Dr. Thuy García. The Effexor is not controlling my symptoms as well as it did initially. I am under new family stressors and the summer heat has brought on intense hot flashes, although not as severe but more frequent. I have not been taking the Lorazepam for quite awhile. How do you feel about doubling the dosage for the rest of the summer?

## 2022-06-13 NOTE — TELEPHONE ENCOUNTER
Contacted by pt via [employer] to increase Effexor in an effort to help with hot flashes. Will increase Effexor to 75mg daily.      Ramírez Zhang MD

## 2022-07-13 ENCOUNTER — PATIENT MESSAGE (OUTPATIENT)
Dept: FAMILY MEDICINE CLINIC | Age: 67
End: 2022-07-13

## 2022-07-13 DIAGNOSIS — F13.24: ICD-10-CM

## 2022-07-13 DIAGNOSIS — F41.9 ANXIETY: ICD-10-CM

## 2022-07-13 RX ORDER — LORAZEPAM 0.5 MG/1
TABLET ORAL
Qty: 30 TABLET | Refills: 0 | Status: SHIPPED | OUTPATIENT
Start: 2022-07-13

## 2022-07-13 NOTE — TELEPHONE ENCOUNTER
From: Lynne Pérez  To: [facility]  Sent: 7/13/2022 1:18 PM EDT  Subject: Prescription refill request    Please refill my RX: [id number] for Lorazepam .5 mg. My pharmacy is [facility] on [address] in [place]. Thank you.

## 2022-07-15 ENCOUNTER — OFFICE VISIT (OUTPATIENT)
Dept: FAMILY MEDICINE CLINIC | Age: 67
End: 2022-07-15
Payer: MEDICARE

## 2022-07-15 VITALS
SYSTOLIC BLOOD PRESSURE: 149 MMHG | HEIGHT: 69 IN | HEART RATE: 70 BPM | RESPIRATION RATE: 19 BRPM | DIASTOLIC BLOOD PRESSURE: 104 MMHG | BODY MASS INDEX: 31.1 KG/M2 | TEMPERATURE: 97.5 F | WEIGHT: 210 LBS | OXYGEN SATURATION: 98 %

## 2022-07-15 DIAGNOSIS — H69.81 DYSFUNCTION OF RIGHT EUSTACHIAN TUBE: Primary | ICD-10-CM

## 2022-07-15 DIAGNOSIS — K12.0 APHTHOUS ULCER: ICD-10-CM

## 2022-07-15 DIAGNOSIS — I10 PRIMARY HYPERTENSION: ICD-10-CM

## 2022-07-15 PROCEDURE — G0463 HOSPITAL OUTPT CLINIC VISIT: HCPCS | Performed by: STUDENT IN AN ORGANIZED HEALTH CARE EDUCATION/TRAINING PROGRAM

## 2022-07-15 PROCEDURE — 1101F PT FALLS ASSESS-DOCD LE1/YR: CPT | Performed by: STUDENT IN AN ORGANIZED HEALTH CARE EDUCATION/TRAINING PROGRAM

## 2022-07-15 PROCEDURE — G8753 SYS BP > OR = 140: HCPCS | Performed by: STUDENT IN AN ORGANIZED HEALTH CARE EDUCATION/TRAINING PROGRAM

## 2022-07-15 PROCEDURE — G8536 NO DOC ELDER MAL SCRN: HCPCS | Performed by: STUDENT IN AN ORGANIZED HEALTH CARE EDUCATION/TRAINING PROGRAM

## 2022-07-15 PROCEDURE — 99213 OFFICE O/P EST LOW 20 MIN: CPT | Performed by: STUDENT IN AN ORGANIZED HEALTH CARE EDUCATION/TRAINING PROGRAM

## 2022-07-15 PROCEDURE — 1123F ACP DISCUSS/DSCN MKR DOCD: CPT | Performed by: STUDENT IN AN ORGANIZED HEALTH CARE EDUCATION/TRAINING PROGRAM

## 2022-07-15 PROCEDURE — G8427 DOCREV CUR MEDS BY ELIG CLIN: HCPCS | Performed by: STUDENT IN AN ORGANIZED HEALTH CARE EDUCATION/TRAINING PROGRAM

## 2022-07-15 PROCEDURE — 1090F PRES/ABSN URINE INCON ASSESS: CPT | Performed by: STUDENT IN AN ORGANIZED HEALTH CARE EDUCATION/TRAINING PROGRAM

## 2022-07-15 PROCEDURE — 3017F COLORECTAL CA SCREEN DOC REV: CPT | Performed by: STUDENT IN AN ORGANIZED HEALTH CARE EDUCATION/TRAINING PROGRAM

## 2022-07-15 PROCEDURE — G8417 CALC BMI ABV UP PARAM F/U: HCPCS | Performed by: STUDENT IN AN ORGANIZED HEALTH CARE EDUCATION/TRAINING PROGRAM

## 2022-07-15 PROCEDURE — G9717 DOC PT DX DEP/BP F/U NT REQ: HCPCS | Performed by: STUDENT IN AN ORGANIZED HEALTH CARE EDUCATION/TRAINING PROGRAM

## 2022-07-15 PROCEDURE — G8755 DIAS BP > OR = 90: HCPCS | Performed by: STUDENT IN AN ORGANIZED HEALTH CARE EDUCATION/TRAINING PROGRAM

## 2022-07-15 PROCEDURE — G8400 PT W/DXA NO RESULTS DOC: HCPCS | Performed by: STUDENT IN AN ORGANIZED HEALTH CARE EDUCATION/TRAINING PROGRAM

## 2022-07-15 NOTE — PROGRESS NOTES
[facility] [address] [place] [address]   Office [phone], Fax [phone]    Subjective:     Chief Complaint   Patient presents with    Sore Throat     For the past three weeks has been having a sore throat. Now patient's right ear hurts as well. History provided by patient     HPI:  Terra Palmer is a 78 y.o. BLACK/ female with medical history of HTN, Sleep Apnea, HLD, and Depression/Anxiety presents for   Chief Complaint   Patient presents with    Sore Throat     For the past three weeks has been having a sore throat. Now patient's right ear hurts as well. Ms. Harley Harrington presents to clinic for Sore Throat, Ear Pain, and Mouth Pain. Mouth Pain: over the last few days. On the left side of the mouth towards the back. Has implanted teeth in that area which she is concerned may be causing a problem. Sore Throat/Ear Pain: for the past 2-3 months. She says that it is on the right side of her throat and that her voice has changed over this time as well. She has not had any fevers/chills. No cough. No trouble with swallowing or breathing. She says she has developed right sided ear pain over the last few days as well. No ear drainage. Social History     Socioeconomic History    Marital status:      Spouse name: Not on file    Number of children: Not on file    Years of education: Not on file    Highest education level: Not on file   Occupational History    Not on file   Tobacco Use    Smoking status: Never    Smokeless tobacco: Never   Vaping Use    Vaping Use: Never used   Substance and Sexual Activity    Alcohol use: Yes     Comment: daily over the past 5 months.  2-3 drinks a day    Drug use: Never    Sexual activity: Not Currently     Partners: Male     Birth control/protection: None   Other Topics Concern    Not on file   Social History Narrative    Not on file     Social Determinants of Health     Financial Resource Strain: Not on file   Food Insecurity: Not on file Transportation Needs: Not on file   Physical Activity: Not on file   Stress: Not on file   Social Connections: Not on file   Intimate Partner Violence: Not on file   Housing Stability: Not on file     Review of Systems   All other systems reviewed and are negative. Objective:     Visit Vitals  BP (!) 149/104 (BP 1 Location: Right arm, BP Patient Position: Sitting, BP Cuff Size: Adult)   Pulse 70   Temp 97.5 °F (36.4 °C) (Temporal)   Resp 19   Ht 5' 8.5\" (1.74 m)   Wt 210 lb (95.3 kg)   SpO2 98%   BMI 31.47 kg/m²      Physical Exam  Constitutional:       Appearance: Normal appearance. HENT:      Head: Normocephalic and atraumatic. Right Ear: Tympanic membrane, ear canal and external ear normal.      Left Ear: Tympanic membrane, ear canal and external ear normal.      Ears:      Comments: Fluid levels visualized behind right TM, no suppurative fluid collection noted. Nose: Nose normal. No congestion or rhinorrhea. Mouth/Throat:      Mouth: Mucous membranes are moist.      Pharynx: Oropharynx is clear. No oropharyngeal exudate or posterior oropharyngeal erythema. Comments: Aphthous Ulcer noted on left bottom side of mouth behind the molar teeth  Eyes:      General:         Right eye: No discharge. Left eye: No discharge. Conjunctiva/sclera: Conjunctivae normal.      Pupils: Pupils are equal, round, and reactive to light. Musculoskeletal:      Cervical back: Normal range of motion and neck supple. No tenderness. Lymphadenopathy:      Cervical: No cervical adenopathy. Neurological:      Mental Status: She is alert. Assessment and orders:       ICD-10-CM ICD-9-CM    1. Dysfunction of right eustachian tube  H69.81 381.81 REFERRAL TO ENT-OTOLARYNGOLOGY      2. Aphthous ulcer  K12.0 528.2       3. Primary hypertension  I10 401.9         1.  Dysfunction of Right Eustachian Tube: unclear cause, usually does not start with sore throat, but given that sore throat and ear pain are on the same side, there may be some correlation  - Referral to ENT for further evaluation    2. Aphthous Ulcer: seen on examination, likely cause of mouth pain  - Discussed OTC gel to help with pain    3. HTN: BP today 149/104, likely given pain, compliant with HCTZ 25mg daily, patient does not want to go up on BP medication today  - Will have patient log pressures daily and bring to clinic at follow up visit    Labs, imaging and immunization ordered as above    Follow Up: 1 month    Pt was discussed with Dr. Annie Miller (attending physician). I have reviewed patient medical and social history and medications. I have reviewed pertinent labs results and other data. I have discussed the diagnosis with the patient and the intended plan as seen in the above orders. The patient has received an after-visit summary and questions were answered concerning future plans. I have discussed medication side effects and warnings with the patient as well.     Luciana Reyes MD  Resident [facility]  07/15/22

## 2022-07-15 NOTE — PROGRESS NOTES
Chief Complaint   Patient presents with    Sore Throat     For the past three weeks has been having a sore throat. Now patient's right ear hurts as well. Visit Vitals  BP (!) 149/104 (BP 1 Location: Right arm, BP Patient Position: Sitting, BP Cuff Size: Adult)   Pulse 70   Temp 97.5 °F (36.4 °C) (Temporal)   Resp 19   Ht 5' 8.5\" (1.74 m)   Wt 210 lb (95.3 kg)   SpO2 98%   BMI 31.47 kg/m²     1. Have you been to the ER, urgent care clinic since your last visit? Hospitalized since your last visit? No    2. Have you seen or consulted any other health care providers outside of the [address] [place] [address] since your last visit? Include any pap smears or colon screening.  No

## 2022-07-25 ENCOUNTER — OFFICE VISIT (OUTPATIENT)
Dept: ENT CLINIC | Age: 67
End: 2022-07-25
Payer: MEDICARE

## 2022-07-25 VITALS
BODY MASS INDEX: 31.83 KG/M2 | SYSTOLIC BLOOD PRESSURE: 140 MMHG | TEMPERATURE: 98 F | HEART RATE: 69 BPM | DIASTOLIC BLOOD PRESSURE: 90 MMHG | WEIGHT: 210 LBS | OXYGEN SATURATION: 97 % | HEIGHT: 68 IN | RESPIRATION RATE: 16 BRPM

## 2022-07-25 DIAGNOSIS — H69.83 ETD (EUSTACHIAN TUBE DYSFUNCTION), BILATERAL: ICD-10-CM

## 2022-07-25 DIAGNOSIS — J34.2 DNS (DEVIATED NASAL SEPTUM): Primary | ICD-10-CM

## 2022-07-25 DIAGNOSIS — H91.91 HEARING LOSS OF RIGHT EAR, UNSPECIFIED HEARING LOSS TYPE: ICD-10-CM

## 2022-07-25 DIAGNOSIS — J34.3 HYPERTROPHY OF BOTH INFERIOR NASAL TURBINATES: ICD-10-CM

## 2022-07-25 DIAGNOSIS — R49.0 DYSPHONIA: ICD-10-CM

## 2022-07-25 DIAGNOSIS — R09.82 PND (POST-NASAL DRIP): ICD-10-CM

## 2022-07-25 PROCEDURE — 1101F PT FALLS ASSESS-DOCD LE1/YR: CPT | Performed by: OTOLARYNGOLOGY

## 2022-07-25 PROCEDURE — G9717 DOC PT DX DEP/BP F/U NT REQ: HCPCS | Performed by: OTOLARYNGOLOGY

## 2022-07-25 PROCEDURE — 99204 OFFICE O/P NEW MOD 45 MIN: CPT | Performed by: OTOLARYNGOLOGY

## 2022-07-25 PROCEDURE — 1090F PRES/ABSN URINE INCON ASSESS: CPT | Performed by: OTOLARYNGOLOGY

## 2022-07-25 PROCEDURE — G8753 SYS BP > OR = 140: HCPCS | Performed by: OTOLARYNGOLOGY

## 2022-07-25 PROCEDURE — G8536 NO DOC ELDER MAL SCRN: HCPCS | Performed by: OTOLARYNGOLOGY

## 2022-07-25 PROCEDURE — 31575 DIAGNOSTIC LARYNGOSCOPY: CPT | Performed by: OTOLARYNGOLOGY

## 2022-07-25 PROCEDURE — 3017F COLORECTAL CA SCREEN DOC REV: CPT | Performed by: OTOLARYNGOLOGY

## 2022-07-25 PROCEDURE — G8427 DOCREV CUR MEDS BY ELIG CLIN: HCPCS | Performed by: OTOLARYNGOLOGY

## 2022-07-25 PROCEDURE — G8400 PT W/DXA NO RESULTS DOC: HCPCS | Performed by: OTOLARYNGOLOGY

## 2022-07-25 PROCEDURE — 1123F ACP DISCUSS/DSCN MKR DOCD: CPT | Performed by: OTOLARYNGOLOGY

## 2022-07-25 PROCEDURE — G8417 CALC BMI ABV UP PARAM F/U: HCPCS | Performed by: OTOLARYNGOLOGY

## 2022-07-25 PROCEDURE — G8755 DIAS BP > OR = 90: HCPCS | Performed by: OTOLARYNGOLOGY

## 2022-07-25 NOTE — LETTER
7/25/2022    Patient: Shahzad Brunson   YOB: 1955   Date of Visit: 7/25/2022     Marilyn Roman MD  [address] [place], [place] [address]  [address] [facility]  [address] [facility] [address]    Dear MD Leigh Link MD,      Thank you for referring Ms. Posey Daughters to [address] [place] [address] for evaluation. My notes for this consultation are attached. If you have questions, please do not hesitate to call me. I look forward to following your patient along with you.       Sincerely,    Rick Klein MD

## 2022-07-25 NOTE — PROGRESS NOTES
Subjective:    Tracee Italia   78 y.o.   1955     New Patient Visit    Location - throat, ear    Quality - throat pain, ear issues, hoarseness    Severity -  moderate    Duration - 20 years    Timing - intermittent, chronic    Context - pt with chronic right side ear, throat, neck symptoms; states she has a hx of right sided hearing loss, globus sensation, some hoarseness, occ pain/throbbing as well    Modifying Features - none    Associated symptoms/signs - ear fullness, pressure      Review of Systems  Review of Systems   Constitutional:  Negative for chills and fever. HENT:  Positive for ear pain, hearing loss and sore throat. Negative for nosebleeds and tinnitus. Eyes:  Negative for blurred vision and double vision. Respiratory:  Negative for cough, sputum production and shortness of breath. Cardiovascular:  Negative for chest pain and palpitations. Gastrointestinal:  Negative for heartburn, nausea and vomiting. Musculoskeletal:  Positive for neck pain. Negative for joint pain. Skin: Negative. Neurological:  Negative for dizziness, speech change, weakness and headaches. Endo/Heme/Allergies:  Negative for environmental allergies. Does not bruise/bleed easily. Psychiatric/Behavioral:  Negative for memory loss. The patient does not have insomnia.         Past Medical History:   Diagnosis Date    Anxiety     Arthritis     Colon polyps     Depression     Diverticular disease     Glaucoma     Heart murmur     Hemorrhoids     Hypercholesterolemia     Hypertension     Neuropathy     Pineal gland cyst     2 cm    Sleep apnea      Past Surgical History:   Procedure Laterality Date    HX CARPAL TUNNEL RELEASE      HX CATARACT REMOVAL      HX COLONOSCOPY      HX HYSTERECTOMY      HX ORTHOPAEDIC      HX ROTATOR CUFF REPAIR        Family History   Problem Relation Age of Onset    Cancer Mother     Neuropathy Mother     Glaucoma Mother     Hypertension Mother     Cancer Father     Liver Disease Father Neuropathy Father     Hypertension Father     Breast Cancer Sister     Hypertension Sister     Heart Disease Brother     Hypertension Brother     Breast Cancer Sister     Colon Cancer Sister     Heart Attack Sister      Social History     Tobacco Use    Smoking status: Never    Smokeless tobacco: Never   Substance Use Topics    Alcohol use: Yes     Comment: daily over the past 5 months. 2-3 drinks a day      Prior to Admission medications    Medication Sig Start Date End Date Taking? Authorizing Provider   LORazepam (ATIVAN) 0.5 mg tablet TAKE 1 TABLET BY MOUTH EVERY DAY AS NEEDED 7/13/22  Yes Timothy Tucker MD   venlafaxine-SR [facility] 37.5 mg capsule Take 2 Capsules by mouth daily. 6/13/22  Yes Timothy Tucker MD   rosuvastatin (CRESTOR) 5 mg tablet TAKE 1 TABLET BY MOUTH EVERY DAY. 5/12/22  Yes Timothy Tucker MD   aspirin 81 mg chewable tablet Take 81 mg by mouth daily. Yes Provider, Historical   POTASSIUM PO Take  by mouth. Yes Provider, Historical   fluticasone propionate (FLONASE) 50 mcg/actuation nasal spray 2 Sprays by Both Nostrils route daily. 3/23/22  Yes Timothy Tucker MD   hydroCHLOROthiazide (HYDRODIURIL) 25 mg tablet Take 1 Tablet by mouth daily. 3/3/22  Yes Timothy Tucker MD   latanoprost (XALATAN) 0.005 % ophthalmic solution latanoprost 0.005 % eye drops   INSTILL 1 DROP INTO BOTH EYES AT BEDTIME AS DIRECTED   Yes Provider, Historical   omeprazole (PRILOSEC) 40 mg capsule omeprazole 40 mg capsule,delayed release   TAKE 1 CAPSULE BY MOUTH EVERY DAY   Yes Provider, Historical   cholecalciferol (VITAMIN D3) (2,000 UNITS /50 MCG) cap capsule cholecalciferol (vitamin D3) 50 mcg (2,000 unit) capsule   TAKE 1 CAPSULE BY MOUTH EVERY DAY   Yes Provider, Historical   multivit-min-iron-FA-lutein (Centrum Silver Women) 8 mg iron-400 mcg-300 mcg tab Take  by mouth.    Yes Provider, Historical        Allergies   Allergen Reactions    Lisinopril Itching     Other reaction(s): RASH, ITCHING    Penicillins Hives, Itching and Swelling     Other reaction(s): RASH    Sulfa (Sulfonamide Antibiotics) Itching     Other reaction(s): RASH, ITCHING         Objective:     Visit Vitals  BP (!) 140/90 (BP 1 Location: Left upper arm, BP Patient Position: Sitting, BP Cuff Size: Adult long)   Pulse 69   Temp 98 °F (36.7 °C) (Temporal)   Resp 16   Ht 5' 8\" (1.727 m)   Wt 210 lb (95.3 kg)   SpO2 97%   BMI 31.93 kg/m²        Physical Exam  Vitals reviewed. Constitutional:       General: She is awake. She is not in acute distress. Appearance: Normal appearance. She is well-groomed. She is obese. HENT:      Head: Normocephalic and atraumatic. Jaw: There is normal jaw occlusion. No trismus, tenderness or malocclusion. Salivary Glands: Right salivary gland is not diffusely enlarged or tender. Left salivary gland is not diffusely enlarged or tender. Right Ear: Hearing, tympanic membrane, ear canal and external ear normal.      Left Ear: Hearing, tympanic membrane, ear canal and external ear normal.      Nose: Septal deviation (right) present. No nasal deformity or mucosal edema. Right Nostril: No epistaxis. Left Nostril: No epistaxis. Right Turbinates: Not enlarged, swollen or pale. Left Turbinates: Not enlarged, swollen or pale. Right Sinus: No maxillary sinus tenderness or frontal sinus tenderness. Left Sinus: No maxillary sinus tenderness or frontal sinus tenderness. Mouth/Throat:      Lips: Pink. No lesions. Mouth: Mucous membranes are moist. No oral lesions. Dentition: Normal dentition. No dental caries. Tongue: No lesions. Palate: No mass and lesions. Pharynx: Oropharynx is clear. Uvula midline. No oropharyngeal exudate or posterior oropharyngeal erythema. Tonsils: No tonsillar exudate. 1+ on the right. 1+ on the left. Eyes:      General: Vision grossly intact. Extraocular Movements: Extraocular movements intact. Right eye: No nystagmus. Left eye: No nystagmus. Conjunctiva/sclera: Conjunctivae normal.      Pupils: Pupils are equal, round, and reactive to light. Neck:      Thyroid: No thyroid mass, thyromegaly or thyroid tenderness. Trachea: Trachea and phonation normal. No tracheal tenderness or tracheal deviation. Cardiovascular:      Rate and Rhythm: Normal rate and regular rhythm. Pulmonary:      Effort: Pulmonary effort is normal. No respiratory distress. Breath sounds: No stridor. Musculoskeletal:         General: No swelling or tenderness. Normal range of motion. Cervical back: No edema or erythema. Lymphadenopathy:      Cervical: No cervical adenopathy. Skin:     General: Skin is warm and dry. Findings: No lesion or rash. Neurological:      General: No focal deficit present. Mental Status: She is alert and oriented to person, place, and time. Mental status is at baseline. Cranial Nerves: Cranial nerves are intact. Coordination: Romberg sign negative. Gait: Gait is intact. Psychiatric:         Mood and Affect: Mood normal.         Behavior: Behavior normal. Behavior is cooperative. Procedure Note - Fiberoptic Laryngoscopy    Verbal consent is obtained. The nares are sprayed with topical lidocaine/oxymetazoline solution. After several minutes the fiberoptic scope is advanced into one or both nasal passages. Findings are as summarized. Nose - edema turbinates, deviated septum  Nasopharynx - normal eustachian tubes, no mucosal lesions  Oropharynx - normal tonsils, tongue base and posterior wall  Hypopharynx - normal pyriform sinus and post-cricoid region  Larynx - normal epiglottis, arytenoids, false cords, and true cords  Subglottis - visualized upper trachea is normal      Assessment/Plan:     Encounter Diagnoses   Name Primary?     DNS (deviated nasal septum) Yes    Hearing loss of right ear, unspecified hearing loss type     Dysphonia     PND (post-nasal drip)     ETD (Eustachian tube dysfunction), bilateral     Hypertrophy of both inferior nasal turbinates      She has DNS to right. Workup in the past was apparently negative but she has been dx with right sided hearing loss. Pineal gland tumor being followed as well. May have ETD symptoms, also need to consider allergic etiology with her spectrum of symptoms. Scope exam today is showing DNS and hypertrophy turbinates. Recommend CT sinus and allergy testing in [address] [place] [address]. Orders Placed This Encounter    [address] [place] [address],DIAGNOSTIC    [id number] - ALLERGY INTRADERMAL SKIN TESTS    CT MAXILLOFACIAL WO CONT       Follow-up and Dispositions               Thank you for referring this patient,    Rosales Arciniega MD, [address] [facility]    [address]  [facility]    [address] [address] [facility] [address]  [place], [address]

## 2022-07-25 NOTE — PROGRESS NOTES
Visit Vitals  BP (!) 140/90 (BP 1 Location: Left upper arm, BP Patient Position: Sitting, BP Cuff Size: Adult long)   Pulse 69   Temp 98 °F (36.7 °C) (Temporal)   Resp 16   Ht 5' 8\" (1.727 m)   Wt 210 lb (95.3 kg)   SpO2 97%   BMI 31.93 kg/m²     Chief Complaint   Patient presents with    Establish Care     Pain in throat and pressure

## 2022-08-16 ENCOUNTER — OFFICE VISIT (OUTPATIENT)
Dept: ENT CLINIC | Age: 67
End: 2022-08-16
Payer: MEDICARE

## 2022-08-16 VITALS — SYSTOLIC BLOOD PRESSURE: 132 MMHG | HEART RATE: 63 BPM | DIASTOLIC BLOOD PRESSURE: 84 MMHG | OXYGEN SATURATION: 98 %

## 2022-08-16 DIAGNOSIS — J30.89 ALLERGIC RHINITIS DUE TO OTHER ALLERGIC TRIGGER, UNSPECIFIED SEASONALITY: ICD-10-CM

## 2022-08-16 DIAGNOSIS — R09.82 PND (POST-NASAL DRIP): Primary | ICD-10-CM

## 2022-08-16 LAB
ALTERNARIA TENUIS IGE, RESP1ALTN: <3
ASPERGILLUS FUMIGATUS 1: <3
BAHIA GRASS,G17A: <3
BERMUDA GRASS IGE, RESP1BERG: <3
BIRCH,TRE1: <3
CAT EPITHELIUM, IGE, CAT: <3
CLADOSPORIUM, IGE, CLAD: <3
COCKROACH,GERMAN, 670778: 9
COTTONWOOD,IGE, CTWD: <3
DOG DANDER,IGE, DOGD: <3
DUST MITE: <3
ELM,IGE, ELM: <3
ENGLISH PLANTAIN, IGE, EGPL: <3
LAMBS QUARTER, IGE, LAMQ: <3
MAPLE/BOX ELDER,TRE3: <3
MOUSE EPITHELIA, 069518: <3
OAK, IGE, OAK: <3
OTHER,OTHU: <3
PIGWEED,WEE7: <3
RAGWEED MIX IGE: <3
SYCAMORE,TRE7: <3
T057-IGE CEDAR, RED: <3
TIMOTHY GRASS IGE, RESP1TIMG: <3
WHITE ASH, ASHW1M: <3

## 2022-08-16 PROCEDURE — 95024 IQ TESTS W/ALLERGENIC XTRCS: CPT | Performed by: OTOLARYNGOLOGY

## 2022-08-16 PROCEDURE — 95004 PERQ TESTS W/ALRGNC XTRCS: CPT | Performed by: OTOLARYNGOLOGY

## 2022-08-18 ENCOUNTER — HOSPITAL ENCOUNTER (EMERGENCY)
Age: 67
Discharge: HOME | End: 2022-08-18
Attending: EMERGENCY MEDICINE
Payer: MEDICARE

## 2022-08-18 ENCOUNTER — HOSPITAL ENCOUNTER (OUTPATIENT)
Dept: CT IMAGING | Age: 67
Discharge: HOME | End: 2022-08-18
Attending: OTOLARYNGOLOGY
Payer: MEDICARE

## 2022-08-18 ENCOUNTER — OFFICE VISIT (OUTPATIENT)
Dept: FAMILY MEDICINE CLINIC | Age: 67
End: 2022-08-18
Payer: MEDICARE

## 2022-08-18 ENCOUNTER — APPOINTMENT (OUTPATIENT)
Dept: CT IMAGING | Age: 67
End: 2022-08-18
Attending: EMERGENCY MEDICINE
Payer: MEDICARE

## 2022-08-18 VITALS
BODY MASS INDEX: 32.28 KG/M2 | HEART RATE: 58 BPM | OXYGEN SATURATION: 99 % | TEMPERATURE: 97.7 F | RESPIRATION RATE: 18 BRPM | SYSTOLIC BLOOD PRESSURE: 176 MMHG | DIASTOLIC BLOOD PRESSURE: 93 MMHG | HEIGHT: 68 IN | WEIGHT: 213 LBS

## 2022-08-18 VITALS
WEIGHT: 213 LBS | TEMPERATURE: 98 F | OXYGEN SATURATION: 98 % | HEIGHT: 68 IN | SYSTOLIC BLOOD PRESSURE: 158 MMHG | BODY MASS INDEX: 32.28 KG/M2 | RESPIRATION RATE: 17 BRPM | HEART RATE: 65 BPM | DIASTOLIC BLOOD PRESSURE: 91 MMHG

## 2022-08-18 DIAGNOSIS — R09.82 PND (POST-NASAL DRIP): ICD-10-CM

## 2022-08-18 DIAGNOSIS — I10 PRIMARY HYPERTENSION: Primary | ICD-10-CM

## 2022-08-18 DIAGNOSIS — J34.2 DNS (DEVIATED NASAL SEPTUM): ICD-10-CM

## 2022-08-18 DIAGNOSIS — I10 HYPERTENSION, UNSPECIFIED TYPE: Primary | ICD-10-CM

## 2022-08-18 DIAGNOSIS — R42 DIZZINESS: ICD-10-CM

## 2022-08-18 DIAGNOSIS — F41.9 ANXIETY: ICD-10-CM

## 2022-08-18 DIAGNOSIS — J34.3 HYPERTROPHY OF BOTH INFERIOR NASAL TURBINATES: ICD-10-CM

## 2022-08-18 DIAGNOSIS — F33.1 MODERATE EPISODE OF RECURRENT MAJOR DEPRESSIVE DISORDER (HCC): ICD-10-CM

## 2022-08-18 LAB
ALBUMIN SERPL-MCNC: 4.1 G/DL (ref 3.5–5)
ALBUMIN/GLOB SERPL: 1.1 {RATIO} (ref 1.1–2.2)
ALP SERPL-CCNC: 72 U/L (ref 45–117)
ALT SERPL-CCNC: 39 U/L (ref 12–78)
ANION GAP SERPL CALC-SCNC: 5 MMOL/L (ref 5–15)
AST SERPL-CCNC: 26 U/L (ref 15–37)
BASOPHILS # BLD: 0.1 K/UL (ref 0–0.1)
BASOPHILS NFR BLD: 1 % (ref 0–1)
BILIRUB SERPL-MCNC: 0.4 MG/DL (ref 0.2–1)
BUN SERPL-MCNC: 12 MG/DL (ref 6–20)
BUN/CREAT SERPL: 13 (ref 12–20)
CALCIUM SERPL-MCNC: 10 MG/DL (ref 8.5–10.1)
CHLORIDE SERPL-SCNC: 107 MMOL/L (ref 97–108)
CO2 SERPL-SCNC: 28 MMOL/L (ref 21–32)
COMMENT, HOLDF: NORMAL
COMMENT, HOLDF: NORMAL
CREAT SERPL-MCNC: 0.9 MG/DL (ref 0.55–1.02)
DIFFERENTIAL METHOD BLD: ABNORMAL
EOSINOPHIL # BLD: 0.2 K/UL (ref 0–0.4)
EOSINOPHIL NFR BLD: 3 % (ref 0–7)
ERYTHROCYTE [DISTWIDTH] IN BLOOD BY AUTOMATED COUNT: 14.6 % (ref 11.5–14.5)
GLOBULIN SER CALC-MCNC: 3.7 G/DL (ref 2–4)
GLUCOSE POC: 105 MG/DL
GLUCOSE SERPL-MCNC: 105 MG/DL (ref 65–100)
HCT VFR BLD AUTO: 41.4 % (ref 35–47)
HGB BLD-MCNC: 12.6 G/DL (ref 11.5–16)
IMM GRANULOCYTES # BLD AUTO: 0 K/UL (ref 0–0.04)
IMM GRANULOCYTES NFR BLD AUTO: 0 % (ref 0–0.5)
LYMPHOCYTES # BLD: 2.3 K/UL (ref 0.8–3.5)
LYMPHOCYTES NFR BLD: 37 % (ref 12–49)
MCH RBC QN AUTO: 25.4 PG (ref 26–34)
MCHC RBC AUTO-ENTMCNC: 30.4 G/DL (ref 30–36.5)
MCV RBC AUTO: 83.3 FL (ref 80–99)
MONOCYTES # BLD: 0.4 K/UL (ref 0–1)
MONOCYTES NFR BLD: 6 % (ref 5–13)
NEUTS SEG # BLD: 3.2 K/UL (ref 1.8–8)
NEUTS SEG NFR BLD: 53 % (ref 32–75)
NRBC # BLD: 0 K/UL (ref 0–0.01)
NRBC BLD-RTO: 0 PER 100 WBC
PLATELET # BLD AUTO: 319 K/UL (ref 150–400)
PMV BLD AUTO: 10 FL (ref 8.9–12.9)
POTASSIUM SERPL-SCNC: 3.5 MMOL/L (ref 3.5–5.1)
PROT SERPL-MCNC: 7.8 G/DL (ref 6.4–8.2)
RBC # BLD AUTO: 4.97 M/UL (ref 3.8–5.2)
SAMPLES BEING HELD,HOLD: NORMAL
SAMPLES BEING HELD,HOLD: NORMAL
SARS-COV-2, XPLCVT: NOT DETECTED
SODIUM SERPL-SCNC: 140 MMOL/L (ref 136–145)
SOURCE, COVRS: NORMAL
TROPONIN-HIGH SENSITIVITY: 6 NG/L (ref 0–51)
WBC # BLD AUTO: 6.1 K/UL (ref 3.6–11)

## 2022-08-18 PROCEDURE — 99284 EMERGENCY DEPT VISIT MOD MDM: CPT

## 2022-08-18 PROCEDURE — 70450 CT HEAD/BRAIN W/O DYE: CPT

## 2022-08-18 PROCEDURE — G8427 DOCREV CUR MEDS BY ELIG CLIN: HCPCS | Performed by: FAMILY MEDICINE

## 2022-08-18 PROCEDURE — 84484 ASSAY OF TROPONIN QUANT: CPT

## 2022-08-18 PROCEDURE — 93000 ELECTROCARDIOGRAM COMPLETE: CPT | Performed by: FAMILY MEDICINE

## 2022-08-18 PROCEDURE — 85025 COMPLETE CBC W/AUTO DIFF WBC: CPT

## 2022-08-18 PROCEDURE — 1101F PT FALLS ASSESS-DOCD LE1/YR: CPT | Performed by: FAMILY MEDICINE

## 2022-08-18 PROCEDURE — G8753 SYS BP > OR = 140: HCPCS | Performed by: FAMILY MEDICINE

## 2022-08-18 PROCEDURE — 93005 ELECTROCARDIOGRAM TRACING: CPT | Performed by: FAMILY MEDICINE

## 2022-08-18 PROCEDURE — G0463 HOSPITAL OUTPT CLINIC VISIT: HCPCS | Performed by: FAMILY MEDICINE

## 2022-08-18 PROCEDURE — 3017F COLORECTAL CA SCREEN DOC REV: CPT | Performed by: FAMILY MEDICINE

## 2022-08-18 PROCEDURE — 74011250637 HC RX REV CODE- 250/637: Performed by: EMERGENCY MEDICINE

## 2022-08-18 PROCEDURE — 82947 ASSAY GLUCOSE BLOOD QUANT: CPT | Performed by: FAMILY MEDICINE

## 2022-08-18 PROCEDURE — U0005 INFEC AGEN DETEC AMPLI PROBE: HCPCS

## 2022-08-18 PROCEDURE — 99214 OFFICE O/P EST MOD 30 MIN: CPT | Performed by: FAMILY MEDICINE

## 2022-08-18 PROCEDURE — 80053 COMPREHEN METABOLIC PANEL: CPT

## 2022-08-18 PROCEDURE — 93005 ELECTROCARDIOGRAM TRACING: CPT

## 2022-08-18 PROCEDURE — G8536 NO DOC ELDER MAL SCRN: HCPCS | Performed by: FAMILY MEDICINE

## 2022-08-18 PROCEDURE — 1090F PRES/ABSN URINE INCON ASSESS: CPT | Performed by: FAMILY MEDICINE

## 2022-08-18 PROCEDURE — 70486 CT MAXILLOFACIAL W/O DYE: CPT

## 2022-08-18 PROCEDURE — G8417 CALC BMI ABV UP PARAM F/U: HCPCS | Performed by: FAMILY MEDICINE

## 2022-08-18 PROCEDURE — G8400 PT W/DXA NO RESULTS DOC: HCPCS | Performed by: FAMILY MEDICINE

## 2022-08-18 PROCEDURE — G8754 DIAS BP LESS 90: HCPCS | Performed by: FAMILY MEDICINE

## 2022-08-18 PROCEDURE — 36415 COLL VENOUS BLD VENIPUNCTURE: CPT

## 2022-08-18 PROCEDURE — 1123F ACP DISCUSS/DSCN MKR DOCD: CPT | Performed by: FAMILY MEDICINE

## 2022-08-18 PROCEDURE — G9717 DOC PT DX DEP/BP F/U NT REQ: HCPCS | Performed by: FAMILY MEDICINE

## 2022-08-18 RX ORDER — AMLODIPINE BESYLATE 5 MG/1
5 TABLET ORAL DAILY
Qty: 30 TABLET | Refills: 0 | Status: SHIPPED | OUTPATIENT
Start: 2022-08-18 | End: 2022-09-23 | Stop reason: SDUPTHER

## 2022-08-18 RX ORDER — LORAZEPAM 1 MG/1
3 TABLET ORAL
Status: COMPLETED | OUTPATIENT
Start: 2022-08-18 | End: 2022-08-18

## 2022-08-18 RX ORDER — LANOLIN ALCOHOL/MO/W.PET/CERES
1000 CREAM (GRAM) TOPICAL DAILY
COMMUNITY

## 2022-08-18 RX ORDER — AMLODIPINE BESYLATE 5 MG/1
5 TABLET ORAL
Status: COMPLETED | OUTPATIENT
Start: 2022-08-18 | End: 2022-08-18

## 2022-08-18 RX ORDER — HYDROCHLOROTHIAZIDE 25 MG/1
25 TABLET ORAL
Status: COMPLETED | OUTPATIENT
Start: 2022-08-18 | End: 2022-08-18

## 2022-08-18 RX ORDER — VENLAFAXINE HYDROCHLORIDE 75 MG/1
75 CAPSULE, EXTENDED RELEASE ORAL DAILY
Qty: 90 CAPSULE | Refills: 2 | Status: SHIPPED | OUTPATIENT
Start: 2022-08-18

## 2022-08-18 RX ADMIN — LORAZEPAM 3 MG: 1 TABLET ORAL at 13:29

## 2022-08-18 RX ADMIN — HYDROCHLOROTHIAZIDE 25 MG: 25 TABLET ORAL at 13:16

## 2022-08-18 RX ADMIN — AMLODIPINE BESYLATE 5 MG: 5 TABLET ORAL at 13:16

## 2022-08-18 NOTE — PROGRESS NOTES
Subjective:   Shahzad Brunson is an 78 y.o. female who presents for evaluation of elevated BP and ear pain. HPI  Chief Complaint   Patient presents with    Blood Pressure Check    Ear Pain    Nausea     X 2 days     For the last 2 days she was not feeling well and was having some dizziness associated with mild nausea. She thought her BP may be elevated. Has not been taking her effexor x 3 days since there was an issue with refills, she is currently taking 75 mg once a day. No weakness or numbness of the body, no speech change. She is getting the evaluation for her hoarsness and allergies with ENT (Dr. Sandra Michaels), has CT scheduled for later today. Reports not eating and drinking anything today due to early time of the appointment. She has been compliant with her HCTZ 25 mg intake, no side effects. Allergies - reviewed: Allergies   Allergen Reactions    Lisinopril Itching     Other reaction(s): RASH, ITCHING    Penicillins Hives, Itching and Swelling     Other reaction(s): RASH    Sulfa (Sulfonamide Antibiotics) Itching     Other reaction(s): RASH, ITCHING         Medications - reviewed:  Current Outpatient Medications   Medication Sig    cyanocobalamin (Vitamin B-12) 1,000 mcg tablet Take 1,000 mcg by mouth daily. venlafaxine-SR (EFFEXOR-XR) 75 mg capsule Take 1 Capsule by mouth daily. amLODIPine (NORVASC) 5 mg tablet Take 1 Tablet by mouth daily. LORazepam (ATIVAN) 0.5 mg tablet TAKE 1 TABLET BY MOUTH EVERY DAY AS NEEDED    rosuvastatin (CRESTOR) 5 mg tablet TAKE 1 TABLET BY MOUTH EVERY DAY. aspirin 81 mg chewable tablet Take 81 mg by mouth daily. POTASSIUM PO Take  by mouth. hydroCHLOROthiazide (HYDRODIURIL) 25 mg tablet Take 1 Tablet by mouth daily.     latanoprost (XALATAN) 0.005 % ophthalmic solution latanoprost 0.005 % eye drops   INSTILL 1 DROP INTO BOTH EYES AT BEDTIME AS DIRECTED    omeprazole (PRILOSEC) 40 mg capsule omeprazole 40 mg capsule,delayed release   TAKE 1 CAPSULE BY MOUTH EVERY DAY    cholecalciferol (VITAMIN D3) (2,000 UNITS /50 MCG) cap capsule cholecalciferol (vitamin D3) 50 mcg (2,000 unit) capsule   TAKE 1 CAPSULE BY MOUTH EVERY DAY    multivit-min-iron-FA-lutein (Centrum Silver Women) 8 mg iron-400 mcg-300 mcg tab Take  by mouth. fluticasone propionate (FLONASE) 50 mcg/actuation nasal spray 2 Sprays by Both Nostrils route daily. (Patient not taking: Reported on 8/18/2022)     No current facility-administered medications for this visit. Past Medical History - reviewed:  Past Medical History:   Diagnosis Date    Anxiety     Arthritis     Colon polyps     Depression     Diverticular disease     Glaucoma     Heart murmur     Hemorrhoids     Hypercholesterolemia     Hypertension     Neuropathy     Pineal gland cyst     2 cm    Sleep apnea        Review of Systems   CONSTITUTIONAL: denies fever. Denies chills. ENT: denies sinus congestion. Denies sinus drainage  CARDIOVASCULAR: denies chest pain. Denies palpitations  RESPIRATORY: denies shortness of breath  NEURO: +dizziness,   MUSCULOSKELETAL: denies joint pain. SKIN: denies rash. Denies easy bruising  PSYCH: denies anxiety. Denies depression        Objective:   Visit Vitals  BP (!) 176/93   Pulse (!) 58   Temp 97.7 °F (36.5 °C) (Oral)   Resp 18   Ht 5' 8\" (1.727 m)   Wt 213 lb (96.6 kg)   SpO2 99%   BMI 32.39 kg/m²       General appearance - alert, well appearing, and in no distress  Ears - bilateral TM's and external ear canals normal  Neck - supple, no significant adenopathy  Chest - clear to auscultation, no wheezes, rales or rhonchi, symmetric air entry  Heart - normal rate, regular rhythm, normal S1, S2, no murmurs, rubs, clicks or gallops  Neurological - alert, oriented, normal speech, no focal findings or movement disorder noted  Musculoskeletal - no joint tenderness, deformity or swelling    Assessment:   Gala Ponce is a 78 y.o. female who was seen today for:    ICD-10-CM ICD-9-CM    1.  Primary hypertension  I10 401.9       2. Dizziness  R42 780.4 AMB POC EKG ROUTINE W/ 12 LEADS, INTER & REP      AMB POC GLUCOSE, QUANTITATIVE, BLOOD      AMB POC EKG ROUTINE W/ 12 LEADS, INTER & REP      3. Anxiety  F41.9 300.00 venlafaxine-SR (EFFEXOR-XR) 75 mg capsule      4. Moderate episode of recurrent major depressive disorder (HCC)  F33.1 296.32 venlafaxine-SR (EFFEXOR-XR) 75 mg capsule              Plan:   Hypertension. BP is elevated today. Likely the underlying symptoms are attributing to the uncontrolled hypertension. Will add low dose of Norvasc 5 mg. Dizziness. Suspect the symptoms are 2/2 combination of poor PO intake, possible mild withdrawal from SSRI and underlying anxiety. The pt reports feeling better about a glass of water. Follow-up and Dispositions    Return in about 1 week (around 8/25/2022) for Got to ER, then Via VV for hypertension follow up with me @ 11.20am.         Addendum:   After the leaving the room the patient reported worsening of the dizziness, feeling extremely wobbly while walking in the waiting room. She reports feeling fatigued. She  attributed  her symptoms to poor PO intake and lack of rest. Discussed with the patient that I was very concerned about her presentation especially with the worsening of the symptoms. POC EKG with sinus bradycadia and  no acute changes. POC . Repeated BP a little higher at 176/93. Discussed with the patient that safest plan is to go to ER and get labs and CT of the head to rule out CVA, ACS and electrolyte abnormalities. The symptoms are likely from the combination of poor PO intake, possible SSRI withdrawal but will need to rule out as above. The patient denies any chest pain, SOB. The sister will transport the patient to the ER. I have discussed the diagnosis with the patient and the intended plan as seen in the above orders. The patient has received an after-visit summary and questions were answered concerning future plans.   I have discussed medication side effects and warnings with the patient as well. Informed pt to return to the office if new symptoms arise.       Madison Walters MD  Family Medicine Attending Physician

## 2022-08-18 NOTE — ED NOTES
Patient reports she needs medication prior to CT, normally takes 3mg PO ativan. Provider made aware.

## 2022-08-18 NOTE — ED PROVIDER NOTES
The history is provided by the patient and medical records. Hypertension   This is a chronic problem. The current episode started more than 1 week ago. The problem has been gradually worsening. Associated symptoms include confusion (reports decreased concentration), dizziness (lightheadedness) and nausea. Pertinent negatives include no chest pain, no palpitations, no blurred vision, no shortness of breath and no vomiting. There are no associated agents (but patient has had weight gain, increased stress, and decreased exercise) to hypertension. Risk factors include hypertension (has not taken  her antihypertensives today).       Past Medical History:   Diagnosis Date    Anxiety     Arthritis     Colon polyps     Depression     Diverticular disease     Glaucoma     Heart murmur     Hemorrhoids     Hypercholesterolemia     Hypertension     Neuropathy     Pineal gland cyst     2 cm    Sleep apnea        Past Surgical History:   Procedure Laterality Date    HX CARPAL TUNNEL RELEASE      HX CATARACT REMOVAL      HX COLONOSCOPY      HX HYSTERECTOMY      HX ORTHOPAEDIC      HX ROTATOR CUFF REPAIR           Family History:   Problem Relation Age of Onset    Cancer Mother     Neuropathy Mother    Nora Miguelina Glaucoma Mother     Hypertension Mother     Cancer Father     Liver Disease Father     Neuropathy Father     Hypertension Father     Breast Cancer Sister     Hypertension Sister     Heart Disease Brother     Hypertension Brother     Breast Cancer Sister     Colon Cancer Sister     Heart Attack Sister        Social History     Socioeconomic History    Marital status:      Spouse name: Not on file    Number of children: Not on file    Years of education: Not on file    Highest education level: Not on file   Occupational History    Not on file   Tobacco Use    Smoking status: Never    Smokeless tobacco: Never   Vaping Use    Vaping Use: Never used   Substance and Sexual Activity    Alcohol use: Yes     Comment: daily over the past 5 months. 2-3 drinks a day    Drug use: Never    Sexual activity: Not Currently     Partners: Male     Birth control/protection: None   Other Topics Concern    Not on file   Social History Narrative    Not on file     Social Determinants of Health     Financial Resource Strain: Low Risk     Difficulty of Paying Living Expenses: Not hard at all   Food Insecurity: No Food Insecurity    Worried About Running Out of Food in the Last Year: Never true    Rupinder of Food in the Last Year: Never true   Transportation Needs: Not on file   Physical Activity: Not on file   Stress: Not on file   Social Connections: Not on file   Intimate Partner Violence: Not on file   Housing Stability: Not on file         ALLERGIES: Lisinopril, Penicillins, and Sulfa (sulfonamide antibiotics)    Review of Systems   Constitutional:  Positive for activity change and fatigue. Negative for chills and fever. Eyes:  Negative for blurred vision. Respiratory:  Negative for shortness of breath. Cardiovascular:  Negative for chest pain and palpitations. Gastrointestinal:  Positive for nausea. Negative for abdominal pain and vomiting. Neurological:  Positive for dizziness (lightheadedness), light-headedness and numbness (has intermittent tingling). Psychiatric/Behavioral:  Positive for confusion (reports decreased concentration). The patient is nervous/anxious. Vitals:    08/18/22 1241   BP: (!) 171/91   Pulse: (!) 54   Resp: 18   Temp: 98 °F (36.7 °C)   SpO2: 98%   Weight: 96.6 kg (213 lb)   Height: 5' 8\" (1.727 m)            Physical Exam  Vitals and nursing note reviewed. Constitutional:       Appearance: Normal appearance. She is well-developed. HENT:      Head: Normocephalic and atraumatic. Eyes:      Pupils: Pupils are equal, round, and reactive to light. Cardiovascular:      Rate and Rhythm: Normal rate and regular rhythm.    Pulmonary:      Effort: Pulmonary effort is normal.      Breath sounds: Normal breath sounds. Abdominal:      General: There is no distension. Palpations: Abdomen is soft. Tenderness: There is no abdominal tenderness. Musculoskeletal:      Cervical back: Normal range of motion and neck supple. Skin:     General: Skin is warm and dry. Capillary Refill: Capillary refill takes less than 2 seconds. Neurological:      General: No focal deficit present. Mental Status: She is alert and oriented to person, place, and time. Cranial Nerves: No cranial nerve deficit. Sensory: No sensory deficit. Motor: No weakness. Gait: Gait normal.   Psychiatric:         Mood and Affect: Mood normal.         Behavior: Behavior normal.        [facility]    ED Course as of 08/18/22 2156   Thu Aug 18, 2022   1242 EKG performed at 12:38 PM shows normal sinus rhythm, 56 bpm, normal axis, normal intervals, no ST changes, no T wave changes, no ectopy. EKG is interpreted by  Chito Man MD  [IO]      ED Course User Index  [IO] Oleksandr Cunha MD       Procedures    The patient is resting comfortably and feels better, is alert, talkative, interactive and in no distress. The repeat examination is unremarkable and benign. The patient is neurologically intact, has a normal mental status and is ambulatory in the ED. The history, exam, diagnostic testing (if any) and the patient's current condition do not suggest arrhythmia, STEMI, seizure, meningitis, stroke, sepsis, subarachnoid hemorrhage, intracranial bleeding, encephalitis or other significant pathology that would warrant further testing, continued ED treatment, admission, neurological consultation, or other specialist evaluation at this point. The vital signs have been stable. The patient's condition is stable and appropriate for discharge.  The patient will pursue further outpatient evaluation with the primary care physician or other designated or consulting physician as indicated in the discharge       IMPRESSION:  1. Hypertension, unspecified type        PLAN:  1.   Continue HCTZ and start amlodipine as prescribed by your primary care provider      Return to ED if worse

## 2022-08-18 NOTE — ED TRIAGE NOTES
Patient sent by PCP for HTN and abnormal EKG. States she has a history of HTN but only takes \" a water pill\". Denies any CP.  States earlier today she had some dizziness and has had nausea x3 days

## 2022-08-18 NOTE — ED NOTES
Patient discharged by provider - no assigned nurse during visit. Patient was in understanding of discharge instructions and symptoms to look out for.     Patient in agree ance to follow up with primary care    No further questions Sahara Luci Teesami presents today for Botox injections. Depression screening done on patient.

## 2022-08-19 ENCOUNTER — TELEPHONE (OUTPATIENT)
Dept: FAMILY MEDICINE CLINIC | Age: 67
End: 2022-08-19

## 2022-08-19 LAB
ATRIAL RATE: 56 BPM
CALCULATED P AXIS, ECG09: 44 DEGREES
CALCULATED R AXIS, ECG10: 12 DEGREES
CALCULATED T AXIS, ECG11: 25 DEGREES
DIAGNOSIS, 93000: NORMAL
P-R INTERVAL, ECG05: 170 MS
Q-T INTERVAL, ECG07: 474 MS
QRS DURATION, ECG06: 76 MS
QTC CALCULATION (BEZET), ECG08: 457 MS
VENTRICULAR RATE, ECG03: 56 BPM

## 2022-08-19 NOTE — TELEPHONE ENCOUNTER
I called the pt @ [phone] to follow up on her  condition. She reports is felling much better today after having a good sleep during the night and rest. She is still have mild nausea w/o abdominal pain. She restart taking her effoxor. Her VS today are 144/91 pulse 66.   Advised the patient continue the current plan and will follow up on 8/25/2022 via VV.  1:13 PM  8/19/2022  Nicholas Rosales MD

## 2022-08-19 NOTE — TELEPHONE ENCOUNTER
Pt is requesting a Rx quantity change on the amlodipine. Would prefer 90 tablets instead of 30 tablets.     Please advise

## 2022-08-22 NOTE — PROGRESS NOTES
There is DNS right and congested turbinates. Allergy testing only showing mild dust and moderate cockroach allergy. Will discuss at her upcoming telemedicine visit.

## 2022-08-24 ENCOUNTER — TELEPHONE (OUTPATIENT)
Dept: ENT CLINIC | Age: 67
End: 2022-08-24

## 2022-08-24 NOTE — TELEPHONE ENCOUNTER
Pt called , had allergy testing done 8 days ago. States she had some \"delayed reactions on her arms\" Recommended no scratching, take antihistamine. Assured pt delayed reaction not indicative of an allergic type reaction. She has a virtual F/U appt 08/26. Nilsa

## 2022-08-25 ENCOUNTER — TELEPHONE (OUTPATIENT)
Dept: FAMILY MEDICINE CLINIC | Age: 67
End: 2022-08-25

## 2022-08-25 ENCOUNTER — VIRTUAL VISIT (OUTPATIENT)
Dept: FAMILY MEDICINE CLINIC | Age: 67
End: 2022-08-25
Payer: MEDICARE

## 2022-08-25 DIAGNOSIS — I10 PRIMARY HYPERTENSION: Primary | ICD-10-CM

## 2022-08-25 DIAGNOSIS — Z12.31 ENCOUNTER FOR SCREENING MAMMOGRAM FOR MALIGNANT NEOPLASM OF BREAST: ICD-10-CM

## 2022-08-25 DIAGNOSIS — F33.1 MODERATE EPISODE OF RECURRENT MAJOR DEPRESSIVE DISORDER (HCC): ICD-10-CM

## 2022-08-25 DIAGNOSIS — E78.00 HYPERCHOLESTEROLEMIA: ICD-10-CM

## 2022-08-25 PROCEDURE — 99442 PR PHYS/QHP TELEPHONE EVALUATION 11-20 MIN: CPT | Performed by: FAMILY MEDICINE

## 2022-08-25 NOTE — PROGRESS NOTES
Sourav Sanchez  78 y.o. female  1955  [address] [facility]  [id number]    [phone] (home)      [address]:    Telephone Encounter  Madison Walters MD       Encounter Date: 8/25/2022 at 8:40 AM    Consent: Sourav Sanchez, who was seen by synchronous (real-time) audio only technology, and/or her healthcare decision maker, is aware that this patient-initiated, Telehealth encounter on 8/25/2022 is a billable service, with coverage as determined by her insurance carrier. She is aware that she may receive a bill and has provided verbal consent to proceed: Yes. Chief Complaint   Patient presents with    Hypertension       History of Present Illness   Sourav Sanchez is a 78 y.o. female was evaluated by telephone. I communicated with the patient and/or health care decision maker about her hypertension. At the last visit 1 week ago her BP was noted to be elevated and Amlodipine 5 mg was added to the regimen. The patient has been compliant with her regimen, no side effects  Home BP  readings in 120-130/80. She denies chest pain, dizziness, HA. Review of Systems   Per HPI    Vitals/Objective:   General: Patient speaking in complete sentences without effort. Normal speech and cooperative. Due to this being a Virtual Check-in/Telephone evaluation, many elements of the physical examination are unable to be assessed. Assessment and Plan: Total Time: minutes: 11-20 minutes      ICD-10-CM ICD-9-CM    1. Primary hypertension  I10 401.9       2. Encounter for screening mammogram for malignant neoplasm of breast  Z12.31 V76.12 [facility] MAMMO BI SCREENING INCL CAD      3. Hypercholesterolemia  E78.00 272.0 LIPID PANEL        The BP is well controlled with the new regimen and is at goal. Continue the same. Patient informed to follow up: in 1 month for Medicare Wellness.      I affirm this is a Patient Initiated Episode with an Established Patient who has not had a related appointment within my department in the past 7 days or scheduled within the next 24 hours. Note: not billable if this call serves to triage the patient into an appointment for the relevant concern      Electronically Signed: Echo Moss MD  Providers location when delivering service: [facility]        ICD-10-CM ICD-9-CM    1. Primary hypertension  I10 401.9       2. Encounter for screening mammogram for malignant neoplasm of breast  Z12.31 V76.12 [facility] MAMMO BI SCREENING INCL CAD      3. Hypercholesterolemia  E78.00 272.0 LIPID PANEL          Pursuant to the emergency declaration under the [facility] waiver authority and the [employer] and [employer] Act, this Virtual  Visit was conducted, with patient's consent, to reduce the patient's risk of exposure to COVID-19 and provide continuity of care for an established patient. History   Patients past medical, surgical and family histories were personally reviewed and updated.       Past Medical History:   Diagnosis Date    Anxiety     Arthritis     Colon polyps     Depression     Diverticular disease     Glaucoma     Heart murmur     Hemorrhoids     Hypercholesterolemia     Hypertension     Neuropathy     Pineal gland cyst     2 cm    Sleep apnea      Past Surgical History:   Procedure Laterality Date    HX CARPAL TUNNEL RELEASE      HX CATARACT REMOVAL      HX COLONOSCOPY      HX HYSTERECTOMY      HX ORTHOPAEDIC      HX ROTATOR CUFF REPAIR       Family History   Problem Relation Age of Onset    Cancer Mother     Neuropathy Mother     Glaucoma Mother     Hypertension Mother     Cancer Father     Liver Disease Father     Neuropathy Father     Hypertension Father     Breast Cancer Sister     Hypertension Sister     Heart Disease Brother     Hypertension Brother     Breast Cancer Sister     Colon Cancer Sister     Heart Attack Sister      Social History     Tobacco Use Smoking status: Never    Smokeless tobacco: Never   Vaping Use    Vaping Use: Never used   Substance Use Topics    Alcohol use: Yes     Comment: daily over the past 5 months. 2-3 drinks a day    Drug use: Never              Current Medications/Allergies   Medications and Allergies reviewed:    Current Outpatient Medications   Medication Sig Dispense Refill    cyanocobalamin (Vitamin B-12) 1,000 mcg tablet Take 1,000 mcg by mouth daily. venlafaxine-SR (EFFEXOR-XR) 75 mg capsule Take 1 Capsule by mouth daily. 90 Capsule 2    amLODIPine (NORVASC) 5 mg tablet Take 1 Tablet by mouth daily. 30 Tablet 0    LORazepam (ATIVAN) 0.5 mg tablet TAKE 1 TABLET BY MOUTH EVERY DAY AS NEEDED 30 Tablet 0    rosuvastatin (CRESTOR) 5 mg tablet TAKE 1 TABLET BY MOUTH EVERY DAY. 90 Tablet 3    aspirin 81 mg chewable tablet Take 81 mg by mouth daily. POTASSIUM PO Take  by mouth. fluticasone propionate (FLONASE) 50 mcg/actuation nasal spray 2 Sprays by Both Nostrils route daily. (Patient not taking: Reported on 8/18/2022) 1 Each 3    hydroCHLOROthiazide (HYDRODIURIL) 25 mg tablet Take 1 Tablet by mouth daily. 90 Tablet 3    latanoprost (XALATAN) 0.005 % ophthalmic solution latanoprost 0.005 % eye drops   INSTILL 1 DROP INTO BOTH EYES AT BEDTIME AS DIRECTED      omeprazole (PRILOSEC) 40 mg capsule omeprazole 40 mg capsule,delayed release   TAKE 1 CAPSULE BY MOUTH EVERY DAY      cholecalciferol (VITAMIN D3) (2,000 UNITS /50 MCG) cap capsule cholecalciferol (vitamin D3) 50 mcg (2,000 unit) capsule   TAKE 1 CAPSULE BY MOUTH EVERY DAY      multivit-min-iron-FA-lutein (Centrum Silver Women) 8 mg iron-400 mcg-300 mcg tab Take  by mouth.        Allergies   Allergen Reactions    Lisinopril Itching     Other reaction(s): RASH, ITCHING    Penicillins Hives, Itching and Swelling     Other reaction(s): RASH    Sulfa (Sulfonamide Antibiotics) Itching     Other reaction(s): RASH, ITCHING

## 2022-08-25 NOTE — TELEPHONE ENCOUNTER
Pt has an appt with you on 9/23 and would like to do her labs a couple days before the appt. She also mentioned for her labs if you can order labs to check her thyroid and glucose levels. Please advise.

## 2022-08-26 ENCOUNTER — VIRTUAL VISIT (OUTPATIENT)
Dept: ENT CLINIC | Age: 67
End: 2022-08-26
Payer: MEDICARE

## 2022-08-26 DIAGNOSIS — R49.0 DYSPHONIA: ICD-10-CM

## 2022-08-26 DIAGNOSIS — J34.2 DNS (DEVIATED NASAL SEPTUM): ICD-10-CM

## 2022-08-26 DIAGNOSIS — R09.82 PND (POST-NASAL DRIP): Primary | ICD-10-CM

## 2022-08-26 DIAGNOSIS — J34.3 HYPERTROPHY OF BOTH INFERIOR NASAL TURBINATES: ICD-10-CM

## 2022-08-26 PROCEDURE — G8417 CALC BMI ABV UP PARAM F/U: HCPCS | Performed by: OTOLARYNGOLOGY

## 2022-08-26 PROCEDURE — G9899 SCRN MAM PERF RSLTS DOC: HCPCS | Performed by: OTOLARYNGOLOGY

## 2022-08-26 PROCEDURE — G8400 PT W/DXA NO RESULTS DOC: HCPCS | Performed by: OTOLARYNGOLOGY

## 2022-08-26 PROCEDURE — 1123F ACP DISCUSS/DSCN MKR DOCD: CPT | Performed by: OTOLARYNGOLOGY

## 2022-08-26 PROCEDURE — 3017F COLORECTAL CA SCREEN DOC REV: CPT | Performed by: OTOLARYNGOLOGY

## 2022-08-26 PROCEDURE — 1090F PRES/ABSN URINE INCON ASSESS: CPT | Performed by: OTOLARYNGOLOGY

## 2022-08-26 PROCEDURE — G8427 DOCREV CUR MEDS BY ELIG CLIN: HCPCS | Performed by: OTOLARYNGOLOGY

## 2022-08-26 PROCEDURE — G8536 NO DOC ELDER MAL SCRN: HCPCS | Performed by: OTOLARYNGOLOGY

## 2022-08-26 PROCEDURE — G8756 NO BP MEASURE DOC: HCPCS | Performed by: OTOLARYNGOLOGY

## 2022-08-26 PROCEDURE — 99213 OFFICE O/P EST LOW 20 MIN: CPT | Performed by: OTOLARYNGOLOGY

## 2022-08-26 PROCEDURE — G9717 DOC PT DX DEP/BP F/U NT REQ: HCPCS | Performed by: OTOLARYNGOLOGY

## 2022-08-26 PROCEDURE — 1101F PT FALLS ASSESS-DOCD LE1/YR: CPT | Performed by: OTOLARYNGOLOGY

## 2022-08-26 NOTE — Clinical Note
Pls mail to patient - Septoplasty handout, and Vivaer procedure pamphlet.   She is considering procedure

## 2022-08-26 NOTE — PROGRESS NOTES
Subjective:    Bjorn Vivar   78 y.o.   1955     Followup Visit    Location - throat, ear    Quality - throat pain, ear issues, hoarseness    Severity -  moderate    Duration - 20 years    Timing - intermittent, chronic    Context - pt with chronic right side ear, throat, neck symptoms; states she has a hx of right sided hearing loss, globus sensation, some hoarseness, occ pain/throbbing as well    Modifying Features - none    Associated symptoms/signs - ear fullness, pressure    08/26/22  Pursuant to the emergency declaration under the [facility] authority and the [employer] and [employer] Act, a synchronous virtual visit is being conducted with the patient's full consent, to reduce risk of exposure to COVID-19 and to provide indicated medical evaluation and treatment. Verbal consent is obtained for a virtual appointment, and patient is aware that insurance will be billed and patient is responsible for any copay or coinsurance. Telemed visit today. Pt had allergy testing done. Pt does c/o nasal congestion, and still some hoarseness, right sided globus. Review of Systems  Review of Systems   Constitutional:  Negative for chills and fever. HENT:  Positive for ear pain, hearing loss and sore throat. Negative for nosebleeds and tinnitus. Eyes:  Negative for blurred vision and double vision. Respiratory:  Negative for cough, sputum production and shortness of breath. Cardiovascular:  Negative for chest pain and palpitations. Gastrointestinal:  Negative for heartburn, nausea and vomiting. Musculoskeletal:  Positive for neck pain. Negative for joint pain. Skin: Negative. Neurological:  Negative for dizziness, speech change, weakness and headaches. Endo/Heme/Allergies:  Negative for environmental allergies. Does not bruise/bleed easily. Psychiatric/Behavioral:  Negative for memory loss.  The patient does not have insomnia. Past Medical History:   Diagnosis Date    Anxiety     Arthritis     Colon polyps     Depression     Diverticular disease     Glaucoma     Heart murmur     Hemorrhoids     Hypercholesterolemia     Hypertension     Neuropathy     Pineal gland cyst     2 cm    Sleep apnea      Past Surgical History:   Procedure Laterality Date    HX CARPAL TUNNEL RELEASE      HX CATARACT REMOVAL      HX COLONOSCOPY      HX HYSTERECTOMY      HX ORTHOPAEDIC      HX ROTATOR CUFF REPAIR        Family History   Problem Relation Age of Onset    Cancer Mother     Neuropathy Mother     Glaucoma Mother     Hypertension Mother     Cancer Father     Liver Disease Father     Neuropathy Father     Hypertension Father     Breast Cancer Sister     Hypertension Sister     Heart Disease Brother     Hypertension Brother     Breast Cancer Sister     Colon Cancer Sister     Heart Attack Sister      Social History     Tobacco Use    Smoking status: Never    Smokeless tobacco: Never   Substance Use Topics    Alcohol use: Yes     Comment: daily over the past 5 months. 2-3 drinks a day      Prior to Admission medications    Medication Sig Start Date End Date Taking? Authorizing Provider   cyanocobalamin (Vitamin B-12) 1,000 mcg tablet Take 1,000 mcg by mouth daily. Provider, Historical   venlafaxine-SR [facility] 75 mg capsule Take 1 Capsule by mouth daily. 8/18/22   Mechelle Anne MD   amLODIPine (NORVASC) 5 mg tablet Take 1 Tablet by mouth daily. 8/18/22   Mechelle Anne MD   LORazepam (ATIVAN) 0.5 mg tablet TAKE 1 TABLET BY MOUTH EVERY DAY AS NEEDED 7/13/22   Timothy Tucker MD   rosuvastatin (CRESTOR) 5 mg tablet TAKE 1 TABLET BY MOUTH EVERY DAY. 5/12/22   Timothy Tucker MD   aspirin 81 mg chewable tablet Take 81 mg by mouth daily. Provider, Historical   POTASSIUM PO Take  by mouth.     Provider, Historical   fluticasone propionate (FLONASE) 50 mcg/actuation nasal spray 2 Sprays by Both Nostrils route daily. Patient not taking: Reported on 8/18/2022 3/23/22   Geovani Hernandez MD   hydroCHLOROthiazide (HYDRODIURIL) 25 mg tablet Take 1 Tablet by mouth daily. 3/3/22   Geovani Hernandez MD   latanoprost (XALATAN) 0.005 % ophthalmic solution latanoprost 0.005 % eye drops   INSTILL 1 DROP INTO BOTH EYES AT BEDTIME AS DIRECTED    Provider, Historical   omeprazole (PRILOSEC) 40 mg capsule omeprazole 40 mg capsule,delayed release   TAKE 1 CAPSULE BY MOUTH EVERY DAY    Provider, Historical   cholecalciferol (VITAMIN D3) (2,000 UNITS /50 MCG) cap capsule cholecalciferol (vitamin D3) 50 mcg (2,000 unit) capsule   TAKE 1 CAPSULE BY MOUTH EVERY DAY    Provider, Historical   multivit-min-iron-FA-lutein (Centrum Silver Women) 8 mg iron-400 mcg-300 mcg tab Take  by mouth. Provider, Historical        Allergies   Allergen Reactions    Lisinopril Itching     Other reaction(s): RASH, ITCHING    Penicillins Hives, Itching and Swelling     Other reaction(s): RASH    Sulfa (Sulfonamide Antibiotics) Itching     Other reaction(s): RASH, ITCHING         Objective: There were no vitals taken for this visit. Physical Exam  Constitutional:       General: She is not in acute distress. Appearance: Normal appearance. She is not ill-appearing. HENT:      Head: Normocephalic and atraumatic. Right Ear: Hearing and external ear normal.      Left Ear: Hearing and external ear normal.      Nose: Nose normal.      Mouth/Throat:      Pharynx: Oropharynx is clear. Eyes:      Extraocular Movements: Extraocular movements intact. Conjunctiva/sclera: Conjunctivae normal.   Pulmonary:      Effort: Pulmonary effort is normal. No respiratory distress. Breath sounds: No stridor. Musculoskeletal:         General: Normal range of motion. Cervical back: Normal range of motion. Skin:     Coloration: Skin is not jaundiced. Findings: No bruising. Neurological:      General: No focal deficit present. Mental Status: She is alert and oriented to person, place, and time. Mental status is at baseline. Psychiatric:         Mood and Affect: Mood normal.         Behavior: Behavior normal.         Thought Content: Thought content normal.     Procedure Note - Fiberoptic Laryngoscopy    Verbal consent is obtained. The nares are sprayed with topical lidocaine/oxymetazoline solution. After several minutes the fiberoptic scope is advanced into one or both nasal passages. Findings are as summarized. Nose - edema turbinates, deviated septum  Nasopharynx - normal eustachian tubes, no mucosal lesions  Oropharynx - normal tonsils, tongue base and posterior wall  Hypopharynx - normal pyriform sinus and post-cricoid region  Larynx - normal epiglottis, arytenoids, false cords, and true cords  Subglottis - visualized upper trachea is normal      Assessment/Plan:     Encounter Diagnoses   Name Primary? PND (post-nasal drip) Yes    DNS (deviated nasal septum)     Dysphonia     Hypertrophy of both inferior nasal turbinates      Allergy testing showing only mild to dust, moderate to cockroach. CT showing DNS right and congested turbinates. No other pathology in throat, and had negative scope exam last visit. She can consider DNS/turb in OR or office procedure. She wants info mailed to her and will call to schedule. Voice tx in future for dysphonia if persists. No orders of the defined types were placed in this encounter. Follow-up and Dispositions               Thank you for referring this patient,    Rosales Clements MD, [address] [facility]    [address]  [facility]    [address] [address]  [address]

## 2022-09-23 ENCOUNTER — OFFICE VISIT (OUTPATIENT)
Dept: FAMILY MEDICINE CLINIC | Age: 67
End: 2022-09-23
Payer: MEDICARE

## 2022-09-23 VITALS
RESPIRATION RATE: 16 BRPM | SYSTOLIC BLOOD PRESSURE: 134 MMHG | OXYGEN SATURATION: 100 % | HEIGHT: 69 IN | HEART RATE: 70 BPM | TEMPERATURE: 98.1 F | WEIGHT: 213.2 LBS | BODY MASS INDEX: 31.58 KG/M2 | DIASTOLIC BLOOD PRESSURE: 84 MMHG

## 2022-09-23 DIAGNOSIS — I10 PRIMARY HYPERTENSION: Primary | ICD-10-CM

## 2022-09-23 PROCEDURE — G8417 CALC BMI ABV UP PARAM F/U: HCPCS | Performed by: FAMILY MEDICINE

## 2022-09-23 PROCEDURE — G8427 DOCREV CUR MEDS BY ELIG CLIN: HCPCS | Performed by: FAMILY MEDICINE

## 2022-09-23 PROCEDURE — G8400 PT W/DXA NO RESULTS DOC: HCPCS | Performed by: FAMILY MEDICINE

## 2022-09-23 PROCEDURE — G9899 SCRN MAM PERF RSLTS DOC: HCPCS | Performed by: FAMILY MEDICINE

## 2022-09-23 PROCEDURE — 99213 OFFICE O/P EST LOW 20 MIN: CPT | Performed by: FAMILY MEDICINE

## 2022-09-23 PROCEDURE — G8752 SYS BP LESS 140: HCPCS | Performed by: FAMILY MEDICINE

## 2022-09-23 PROCEDURE — G8754 DIAS BP LESS 90: HCPCS | Performed by: FAMILY MEDICINE

## 2022-09-23 PROCEDURE — G0463 HOSPITAL OUTPT CLINIC VISIT: HCPCS | Performed by: FAMILY MEDICINE

## 2022-09-23 PROCEDURE — 1101F PT FALLS ASSESS-DOCD LE1/YR: CPT | Performed by: FAMILY MEDICINE

## 2022-09-23 PROCEDURE — G9717 DOC PT DX DEP/BP F/U NT REQ: HCPCS | Performed by: FAMILY MEDICINE

## 2022-09-23 PROCEDURE — 1123F ACP DISCUSS/DSCN MKR DOCD: CPT | Performed by: FAMILY MEDICINE

## 2022-09-23 PROCEDURE — 3017F COLORECTAL CA SCREEN DOC REV: CPT | Performed by: FAMILY MEDICINE

## 2022-09-23 PROCEDURE — G8536 NO DOC ELDER MAL SCRN: HCPCS | Performed by: FAMILY MEDICINE

## 2022-09-23 PROCEDURE — 1090F PRES/ABSN URINE INCON ASSESS: CPT | Performed by: FAMILY MEDICINE

## 2022-09-23 RX ORDER — AMLODIPINE BESYLATE 5 MG/1
5 TABLET ORAL DAILY
Qty: 90 TABLET | Refills: 1 | Status: SHIPPED | OUTPATIENT
Start: 2022-09-23

## 2022-09-23 NOTE — PROGRESS NOTES
Subjective:   Laura Kauffman is an 78 y.o. female who presents for follow up on her hypertension. HPI  Chief Complaint   Patient presents with    Follow Up Chronic Condition    Hypertension     The patient reports feeding much better compared to the last visit. The dizziness and headaches resolved. She has been compliant with her Amlodipine 5 mg and HCTZ 25 mg, no side effects. Allergies - reviewed: Allergies   Allergen Reactions    Lisinopril Itching     Other reaction(s): RASH, ITCHING    Penicillins Hives, Itching and Swelling     Other reaction(s): RASH    Sulfa (Sulfonamide Antibiotics) Itching     Other reaction(s): RASH, ITCHING         Medications - reviewed:  Current Outpatient Medications   Medication Sig    amLODIPine (NORVASC) 5 mg tablet Take 1 Tablet by mouth daily. cyanocobalamin 1,000 mcg tablet Take 1,000 mcg by mouth daily. venlafaxine-SR (EFFEXOR-XR) 75 mg capsule Take 1 Capsule by mouth daily. LORazepam (ATIVAN) 0.5 mg tablet TAKE 1 TABLET BY MOUTH EVERY DAY AS NEEDED    rosuvastatin (CRESTOR) 5 mg tablet TAKE 1 TABLET BY MOUTH EVERY DAY. aspirin 81 mg chewable tablet Take 81 mg by mouth daily. POTASSIUM PO Take  by mouth. hydroCHLOROthiazide (HYDRODIURIL) 25 mg tablet Take 1 Tablet by mouth daily. latanoprost (XALATAN) 0.005 % ophthalmic solution latanoprost 0.005 % eye drops   INSTILL 1 DROP INTO BOTH EYES AT BEDTIME AS DIRECTED    omeprazole (PRILOSEC) 40 mg capsule omeprazole 40 mg capsule,delayed release   TAKE 1 CAPSULE BY MOUTH EVERY DAY    cholecalciferol (VITAMIN D3) (2,000 UNITS /50 MCG) cap capsule cholecalciferol (vitamin D3) 50 mcg (2,000 unit) capsule   TAKE 1 CAPSULE BY MOUTH EVERY DAY    multivit-min-iron-FA-lutein (Centrum Silver Women) 8 mg iron-400 mcg-300 mcg tab Take  by mouth. fluticasone propionate (FLONASE) 50 mcg/actuation nasal spray 2 Sprays by Both Nostrils route daily.  (Patient not taking: Reported on 8/18/2022)     No current facility-administered medications for this visit. Review of Systems   CONSTITUTIONAL: denies fever. Denies chills. EYES: denies double vision. CARDIOVASCULAR: denies chest pain. Denies palpitations  RESPIRATORY: denies shortness of breath    Objective:   Visit Vitals  /84   Pulse 70   Temp 98.1 °F (36.7 °C) (Oral)   Resp 16   Ht 5' 9\" (1.753 m)   Wt 213 lb 3.2 oz (96.7 kg)   SpO2 100%   BMI 31.48 kg/m²       General appearance - alert, well appearing, and in no distress  Chest - clear to auscultation, no wheezes, rales or rhonchi, symmetric air entry  Heart - normal rate, regular rhythm, normal S1, S2, no murmurs, rubs, clicks or gallops  Extremities - peripheral pulses normal, no pedal edema, no clubbing or cyanosis      Assessment:   Cary Hauser is a 78 y.o. female who was seen today for:    ICD-10-CM ICD-9-CM    1. Primary hypertension  I10 401.9           BP is well controlled and at goal with the current regimen. Plan:   Continue current regimen with HCTZ 25 mg and Amlodipine 5mg  Continue checking BP daily and follow up in 1 month for Medicare wellness and BP check. If BP is well controlled and on the lower end may consider dc Amlodipine. Follow-up and Dispositions    Return in about 5 weeks (around 10/27/2022) for Medicare Wellness and hypertension follow up . I have discussed the diagnosis with the patient and the intended plan as seen in the above orders. The patient has received an after-visit summary and questions were answered concerning future plans. I have discussed medication side effects and warnings with the patient as well. Informed pt to return to the office if new symptoms arise.       Payal Singh MD  Family Medicine Attending Physician

## 2022-10-27 ENCOUNTER — OFFICE VISIT (OUTPATIENT)
Dept: FAMILY MEDICINE CLINIC | Age: 67
End: 2022-10-27
Payer: MEDICARE

## 2022-10-27 VITALS
HEART RATE: 74 BPM | HEIGHT: 68 IN | SYSTOLIC BLOOD PRESSURE: 125 MMHG | OXYGEN SATURATION: 97 % | WEIGHT: 216 LBS | BODY MASS INDEX: 32.74 KG/M2 | RESPIRATION RATE: 16 BRPM | DIASTOLIC BLOOD PRESSURE: 80 MMHG

## 2022-10-27 DIAGNOSIS — F33.1 MODERATE EPISODE OF RECURRENT MAJOR DEPRESSIVE DISORDER (HCC): ICD-10-CM

## 2022-10-27 DIAGNOSIS — Z71.89 ADVANCE CARE PLANNING: ICD-10-CM

## 2022-10-27 DIAGNOSIS — Z78.0 POSTMENOPAUSAL STATE: ICD-10-CM

## 2022-10-27 DIAGNOSIS — I10 PRIMARY HYPERTENSION: ICD-10-CM

## 2022-10-27 DIAGNOSIS — Z00.00 MEDICARE ANNUAL WELLNESS VISIT, SUBSEQUENT: Primary | ICD-10-CM

## 2022-10-27 PROCEDURE — G8752 SYS BP LESS 140: HCPCS | Performed by: FAMILY MEDICINE

## 2022-10-27 PROCEDURE — G9899 SCRN MAM PERF RSLTS DOC: HCPCS | Performed by: FAMILY MEDICINE

## 2022-10-27 PROCEDURE — G8754 DIAS BP LESS 90: HCPCS | Performed by: FAMILY MEDICINE

## 2022-10-27 PROCEDURE — G8417 CALC BMI ABV UP PARAM F/U: HCPCS | Performed by: FAMILY MEDICINE

## 2022-10-27 PROCEDURE — G8427 DOCREV CUR MEDS BY ELIG CLIN: HCPCS | Performed by: FAMILY MEDICINE

## 2022-10-27 PROCEDURE — G8536 NO DOC ELDER MAL SCRN: HCPCS | Performed by: FAMILY MEDICINE

## 2022-10-27 PROCEDURE — 1101F PT FALLS ASSESS-DOCD LE1/YR: CPT | Performed by: FAMILY MEDICINE

## 2022-10-27 PROCEDURE — G9717 DOC PT DX DEP/BP F/U NT REQ: HCPCS | Performed by: FAMILY MEDICINE

## 2022-10-27 PROCEDURE — G8400 PT W/DXA NO RESULTS DOC: HCPCS | Performed by: FAMILY MEDICINE

## 2022-10-27 PROCEDURE — 3074F SYST BP LT 130 MM HG: CPT | Performed by: FAMILY MEDICINE

## 2022-10-27 PROCEDURE — 3078F DIAST BP <80 MM HG: CPT | Performed by: FAMILY MEDICINE

## 2022-10-27 PROCEDURE — 1123F ACP DISCUSS/DSCN MKR DOCD: CPT | Performed by: FAMILY MEDICINE

## 2022-10-27 PROCEDURE — 3017F COLORECTAL CA SCREEN DOC REV: CPT | Performed by: FAMILY MEDICINE

## 2022-10-27 PROCEDURE — G0439 PPPS, SUBSEQ VISIT: HCPCS | Performed by: FAMILY MEDICINE

## 2022-10-27 NOTE — PROGRESS NOTES
History of Present Illness:     Chief Complaint   Patient presents with    Annual Wellness Visit       Rabia Parmar is a 78 y.o. female     [address]; see MA's note    HTN  Doing well  Taking her HCTZ and Amlodipine    Anxiety and depression  Taking her Effexor, which is helping  [de-identified] yo sister living with her, so more stressed  Taking the Ativan sparingly  Drinking did  a bit    Due for mammogram and DEXA    PMH (REVIEWED):  Past Medical History:   Diagnosis Date    Anxiety     Arthritis     Colon polyps     Depression     Diverticular disease     Glaucoma     Heart murmur     Hemorrhoids     Hypercholesterolemia     Hypertension     Neuropathy     Pineal gland cyst     2 cm    Sleep apnea        Current Medications/Allergies (REVIEWED):     Current Outpatient Medications on File Prior to Visit   Medication Sig Dispense Refill    amLODIPine (NORVASC) 5 mg tablet Take 1 Tablet by mouth daily. 90 Tablet 1    cyanocobalamin 1,000 mcg tablet Take 1,000 mcg by mouth daily. venlafaxine-SR (EFFEXOR-XR) 75 mg capsule Take 1 Capsule by mouth daily. 90 Capsule 2    LORazepam (ATIVAN) 0.5 mg tablet TAKE 1 TABLET BY MOUTH EVERY DAY AS NEEDED 30 Tablet 0    rosuvastatin (CRESTOR) 5 mg tablet TAKE 1 TABLET BY MOUTH EVERY DAY. 90 Tablet 3    aspirin 81 mg chewable tablet Take 81 mg by mouth daily. POTASSIUM PO Take  by mouth. fluticasone propionate (FLONASE) 50 mcg/actuation nasal spray 2 Sprays by Both Nostrils route daily. 1 Each 3    hydroCHLOROthiazide (HYDRODIURIL) 25 mg tablet Take 1 Tablet by mouth daily.  90 Tablet 3    omeprazole (PRILOSEC) 40 mg capsule omeprazole 40 mg capsule,delayed release   TAKE 1 CAPSULE BY MOUTH EVERY DAY      cholecalciferol (VITAMIN D3) (2,000 UNITS /50 MCG) cap capsule cholecalciferol (vitamin D3) 50 mcg (2,000 unit) capsule   TAKE 1 CAPSULE BY MOUTH EVERY DAY      multivit-min-iron-FA-lutein (Centrum Silver Women) 8 mg iron-400 mcg-300 mcg tab Take  by mouth.      latanoprost (XALATAN) 0.005 % ophthalmic solution latanoprost 0.005 % eye drops   INSTILL 1 DROP INTO BOTH EYES AT BEDTIME AS DIRECTED (Patient not taking: Reported on 10/27/2022)       No current facility-administered medications on file prior to visit. Allergies   Allergen Reactions    Lisinopril Itching     Other reaction(s): RASH, ITCHING    Penicillins Hives, Itching and Swelling     Other reaction(s): RASH    Sulfa (Sulfonamide Antibiotics) Itching     Other reaction(s): RASH, ITCHING         Review of Systems:     Review of Systems   Respiratory:  Negative for cough and shortness of breath. Cardiovascular:  Negative for chest pain, palpitations and leg swelling. Psychiatric/Behavioral:  Negative for depression. The patient is not nervous/anxious. Objective:     Vitals:    10/27/22 1401   BP: 125/80   Pulse: 74   Resp: 16   SpO2: 97%   Weight: 216 lb (98 kg)       Physical Exam:  General appearance - alert, well appearing, and in no distress and overweight  Mental status - normal mood, behavior, speech, dress, motor activity, and thought processes  Chest - clear to auscultation, no wheezes, rales or rhonchi, symmetric air entry  Heart - normal rate, regular rhythm, normal S1, S2, no murmurs, rubs, clicks or gallops  Abdomen - soft, nontender, nondistended, no masses or organomegaly    Recent Labs:  No results found for this or any previous visit (from the past 12 hour(s)). Assessment and Plan:       ICD-10-CM ICD-9-CM    1. Medicare annual wellness visit, subsequent  Z00.00 V70.0       2. Advance care planning  Z71.89 V65.49       3. Moderate episode of recurrent major depressive disorder (HCC)  F33.1 296.32       4. Primary hypertension  I10 401.9       5.  Postmenopausal state  Z78.0 V49.81 DEXA BONE DENSITY STUDY AXIAL        [facility]; see MA's note  ACP planning  - MDM updated in chart  - Pt has AD forms; will bring to office  - FULL code    MDD, stable    HTN, BP well controlled    Postmenopausal state  DEXA ordered  Can be done at time of mammo    Follow up: for wt loss after labs done    Terry Gonzáles MD    We discussed the expected course, resolution and complications of the diagnosis(es) in detail. Medication risks, benefits, costs, interactions, and alternatives were discussed as indicated. I advised her to contact the office if her condition worsens, changes or fails to improve as anticipated. She expressed understanding with the diagnosis(es) and plan.

## 2022-10-27 NOTE — PATIENT INSTRUCTIONS
Medicare Wellness Visit, Female     The best way to live healthy is to have a lifestyle where you eat a well-balanced diet, exercise regularly, limit alcohol use, and quit all forms of tobacco/nicotine, if applicable. Regular preventive services are another way to keep healthy. Preventive services (vaccines, screening tests, monitoring & exams) can help personalize your care plan, which helps you manage your own care. Screening tests can find health problems at the earliest stages, when they are easiest to treat. [facility] follows the current, evidence-based guidelines published by the [facility] ([facility]) when recommending preventive services for our patients. Because we follow these guidelines, sometimes recommendations change over time as research supports it. (For example, mammograms used to be recommended annually. Even though Medicare will still pay for an annual mammogram, the newer guidelines recommend a mammogram every two years for women of average risk). Of course, you and your doctor may decide to screen more often for some diseases, based on your risk and your co-morbidities (chronic disease you are already diagnosed with). Preventive services for you include:  - Medicare offers their members a free annual wellness visit, which is time for you and your primary care provider to discuss and plan for your preventive service needs. Take advantage of this benefit every year!  -All adults over the age of 72 should receive the recommended pneumonia vaccines. Current USPSTF guidelines recommend a series of two vaccines for the best pneumonia protection.   -All adults should have a flu vaccine yearly and a tetanus vaccine every 10 years.   -All adults age 48 and older should receive the shingles vaccines (series of two vaccines).       -All adults age 38-68 who are overweight should have a diabetes screening test once every three years.   -All adults born between [birth year] and 1964 should be screened once for Hepatitis C.  -Other screening tests and preventive services for persons with diabetes include: an eye exam to screen for diabetic retinopathy, a kidney function test, a foot exam, and stricter control over your cholesterol.   -Cardiovascular screening for adults with routine risk involves an electrocardiogram (ECG) at intervals determined by your doctor.   -Colorectal cancer screenings should be done for adults age 54-65 with no increased risk factors for colorectal cancer. There are a number of acceptable methods of screening for this type of cancer. Each test has its own benefits and drawbacks. Discuss with your doctor what is most appropriate for you during your annual wellness visit. The different tests include: colonoscopy (considered the best screening method), a fecal occult blood test, a fecal DNA test, and sigmoidoscopy.    -A bone mass density test is recommended when a woman turns 65 to screen for osteoporosis. This test is only recommended one time, as a screening. Some providers will use this same test as a disease monitoring tool if you already have osteoporosis. -Breast cancer screenings are recommended every other year for women of normal risk, age 54-69.  -Cervical cancer screenings for women over age 72 are only recommended with certain risk factors.      Here is a list of your current Health Maintenance items (your personalized list of preventive services) with a due date:  Health Maintenance Due   Topic Date Due    Mammogram  06/14/2017    Bone Mineral Density   Never done    COVID-19 Vaccine (4 - Booster for [facility] Ser series) 01/12/2022    Yearly Flu Vaccine (1) 08/01/2022    Cholesterol Test   09/02/2022    Annual Well Visit  10/22/2022

## 2022-10-27 NOTE — ACP (ADVANCE CARE PLANNING)
Advance Care Planning     Advance Care Planning (ACP) Physician/NP/PA Conversation      Date of Conversation: 10/27/2022  Conducted with: Patient with Decision Making Capacity    Healthcare Decision Maker:     Primary Decision Maker: Aaron Holley - Daughter - [phone]    Secondary Decision Maker: Marilu Kimball - Sister - [phone]  Click here to complete [address] including selection of the Healthcare Decision Maker Relationship (ie \"Primary\")      Today we documented Decision Maker(s) consistent with Legal Next of Kin hierarchy. Care Preferences:    Hospitalization: \"If your health worsens and it becomes clear that your chance of recovery is unlikely, what would be your preference regarding hospitalization? \"  The patient would prefer hospitalization. Ventilation: \"If you were unable to breathe on your own and your chance of recovery was unlikely, what would be your preference about the use of a ventilator (breathing machine) if it was available to you? \"   The patient would desire the use of a ventilator. Resuscitation: \"In the event your heart stopped as a result of an underlying serious health condition, would you want attempts to be made to restart your heart, or would you prefer a natural death? \"   Yes, attempt to resuscitate.     Additional topics discussed: treatment goals, ventilation preferences, hospitalization preferences, and resuscitation preferences    Conversation Outcomes / Follow-Up Plan:   ACP in process - completing/providing documents   Reviewed DNR/DNI and patient elects Full Code (Attempt Resuscitation)     Length of Voluntary ACP Conversation in minutes:  <16 minutes (Non-Billable)    Linda Chun MD

## 2022-10-27 NOTE — PROGRESS NOTES
Medicare Annual Wellness Visit    Nithya Johnson is a 78 y.o. female    Chief Complaint   Patient presents with    Annual Wellness Visit     1. Have you been to the ER, urgent care clinic since your last visit? Hospitalized since your last visit? No  2. Have you seen or consulted any other health care providers outside of the [address] [place] [address] since your last visit? Include any pap smears or colon screening. No    Visit Vitals  /80 (BP 1 Location: Right arm, BP Patient Position: Sitting)   Pulse 74   Resp 16   Ht 5' 8\" (1.727 m)   Wt 216 lb (98 kg)   SpO2 97%   BMI 32.84 kg/m²         General Health Questions   -During the past 4 weeks:   -How would you rate your health in general? Fair   -How often have you been bothered by feeling dizzy when standing up? occasionally  -How much have you been bothered by bodily pain? moderately  -Has your physical and emotional health limited your social activities with family or friends? no    Emotional Health Questions   -Do you have a history of depression, anxiety, or emotional problems?  yes  -Over the past 2 weeks, have you felt down, depressed or hopeless? no  -Over the past 2 weeks, have you felt little interest or pleasure in doing things? no    Depression Risk Factor Screening     3 most recent PHQ Screens 10/27/2022   Little interest or pleasure in doing things Not at all   Feeling down, depressed, irritable, or hopeless Not at all   Total Score PHQ 2 0   Trouble falling or staying asleep, or sleeping too much -   Feeling tired or having little energy -   Poor appetite, weight loss, or overeating -   Feeling bad about yourself - or that you are a failure or have let yourself or your family down -   Trouble concentrating on things such as school, work, reading, or watching TV -   Moving or speaking so slowly that other people could have noticed; or the opposite being so fidgety that others notice -   Thoughts of being better off dead, or hurting yourself in some way -   PHQ 9 Score -   How difficult have these problems made it for you to do your work, take care of your home and get along with others -       Health Habits   -Please describe your diet habits: usually skip breakfast low varbs   -Do you get 5 servings of fruits or vegetables daily? no  -Do you exercise regularly? no    Alcohol & Drug Abuse Risk Screen    Do you average more than 1 drink per night or more than 7 drinks a week:  No    On any one occasion in the past three months have you have had more than 3 drinks containing alcohol:  Yes            Activities of Daily Living    -Do you need help with eating, walking, dressing, bathing, toileting, the phone, transportation, shopping, preparing meals, housework, laundry, medications or managing money? no  -In the past four weeks, was someone available to help you if you needed and wanted help with anything? no  -Are you confident are you that you can control and manage most of your health problems? yes  -Have you been given information to help you keep track of your medications? no  -How often do you have trouble taking your medications as prescribed? occasionally  Patient does total self care   Hearing: Hearing is good. Fall Risk and Home Safety   -Have you fallen 2 or more times in the past year? no  -Does your home have rugs in the hallways? no,   -Do you have grab bars in the bathrooms? yes,   -Does your home have handrails on the stairs? yes,   -Do you have adequate lighting in your home? yes  -Do you have smoke detectors and check them regularly? yes  -Do you have difficulties driving a car/vehicle? no  -Do you always fasten your seat belt when you are in a car? yes  -The home contains: handrails and grab bars  Fall Risk:  Fall Risk Assessment, last 12 mths 10/27/2022   Able to walk? Yes   Fall in past 12 months? 0   Do you feel unsteady? 0   Are you worried about falling 0   Is TUG test greater than 12 seconds?  -   Is the gait abnormal? - Number of falls in past 12 months -   Fall with injury? -       Abuse Screen:  Patient is not abused    Cognitive Screening    Has your family/caregiver stated any concerns about your memory: no      Mini Cog Score (65+): 5/5    Health Maintenance Due     Health Maintenance Due   Topic Date Due    Breast Cancer Screen Mammogram  06/14/2017    Bone Densitometry (Dexa) Screening  Never done    COVID-19 Vaccine (4 - Booster for Moderna series) 01/12/2022    Flu Vaccine (1) 08/01/2022    Lipid Screen  09/02/2022       Patient Care Team   Patient Care Team:  Henok Devlin MD as PCP - General (Family Medicine)  Henok Devlin MD as PCP - [facility] Empaneled Provider    History     Patient Active Problem List   Diagnosis Code    Hypertensive disorder I10    Hypercholesterolemia E78.00    Glaucoma H40.9    Depression F32. A    Anxiety F41.9    Colon polyps K63.5    Obesity E66.9    Pineal gland cyst E34.8    GERD (gastroesophageal reflux disease) K21.9     Past Medical History:   Diagnosis Date    Anxiety     Arthritis     Colon polyps     Depression     Diverticular disease     Glaucoma     Heart murmur     Hemorrhoids     Hypercholesterolemia     Hypertension     Neuropathy     Pineal gland cyst     2 cm    Sleep apnea       Past Surgical History:   Procedure Laterality Date    HX CARPAL TUNNEL RELEASE      HX CATARACT REMOVAL      HX COLONOSCOPY      HX HYSTERECTOMY      HX ORTHOPAEDIC      HX ROTATOR CUFF REPAIR       Current Outpatient Medications   Medication Sig Dispense Refill    amLODIPine (NORVASC) 5 mg tablet Take 1 Tablet by mouth daily. 90 Tablet 1    cyanocobalamin 1,000 mcg tablet Take 1,000 mcg by mouth daily. venlafaxine-SR (EFFEXOR-XR) 75 mg capsule Take 1 Capsule by mouth daily. 90 Capsule 2    LORazepam (ATIVAN) 0.5 mg tablet TAKE 1 TABLET BY MOUTH EVERY DAY AS NEEDED 30 Tablet 0    rosuvastatin (CRESTOR) 5 mg tablet TAKE 1 TABLET BY MOUTH EVERY DAY.  90 Tablet 3    aspirin 81 mg chewable tablet Take 81 mg by mouth daily. POTASSIUM PO Take  by mouth. fluticasone propionate (FLONASE) 50 mcg/actuation nasal spray 2 Sprays by Both Nostrils route daily. 1 Each 3    hydroCHLOROthiazide (HYDRODIURIL) 25 mg tablet Take 1 Tablet by mouth daily. 90 Tablet 3    omeprazole (PRILOSEC) 40 mg capsule omeprazole 40 mg capsule,delayed release   TAKE 1 CAPSULE BY MOUTH EVERY DAY      cholecalciferol (VITAMIN D3) (2,000 UNITS /50 MCG) cap capsule cholecalciferol (vitamin D3) 50 mcg (2,000 unit) capsule   TAKE 1 CAPSULE BY MOUTH EVERY DAY      multivit-min-iron-FA-lutein (Centrum Silver Women) 8 mg iron-400 mcg-300 mcg tab Take  by mouth.      latanoprost (XALATAN) 0.005 % ophthalmic solution latanoprost 0.005 % eye drops   INSTILL 1 DROP INTO BOTH EYES AT BEDTIME AS DIRECTED (Patient not taking: Reported on 10/27/2022)       Allergies   Allergen Reactions    Lisinopril Itching     Other reaction(s): RASH, ITCHING    Penicillins Hives, Itching and Swelling     Other reaction(s): RASH    Sulfa (Sulfonamide Antibiotics) Itching     Other reaction(s): RASH, ITCHING       Family History   Problem Relation Age of Onset    Cancer Mother     Neuropathy Mother     Glaucoma Mother     Hypertension Mother     Cancer Father     Liver Disease Father     Neuropathy Father     Hypertension Father     Breast Cancer Sister     Hypertension Sister     Breast Cancer Sister     Colon Cancer Sister     Heart Attack Sister     Heart Disease Brother     Hypertension Brother      Social History     Tobacco Use    Smoking status: Never    Smokeless tobacco: Never   Substance Use Topics    Alcohol use: Yes     Comment: daily over the past 5 months.  2-3 drinks a day         Health Maintenance Due   Topic Date Due    Breast Cancer Screen Mammogram  06/14/2017    Bone Densitometry (Dexa) Screening  Never done    COVID-19 Vaccine (4 - Booster for Moderna series) 01/12/2022    Flu Vaccine (1) 08/01/2022    Lipid Screen  09/02/2022 Jack Oglesby

## 2022-11-01 ENCOUNTER — LAB ONLY (OUTPATIENT)
Dept: FAMILY MEDICINE CLINIC | Age: 67
End: 2022-11-01

## 2022-11-01 DIAGNOSIS — F33.1 MODERATE EPISODE OF RECURRENT MAJOR DEPRESSIVE DISORDER (HCC): ICD-10-CM

## 2022-11-01 DIAGNOSIS — E78.00 HYPERCHOLESTEROLEMIA: ICD-10-CM

## 2022-11-01 LAB
CHOLEST SERPL-MCNC: 186 MG/DL
EST. AVERAGE GLUCOSE BLD GHB EST-MCNC: 151 MG/DL
HBA1C MFR BLD: 6.9 % (ref 4–5.6)
HDLC SERPL-MCNC: 73 MG/DL
HDLC SERPL: 2.5 {RATIO} (ref 0–5)
LDLC SERPL CALC-MCNC: 96.4 MG/DL (ref 0–100)
TRIGL SERPL-MCNC: 83 MG/DL (ref ?–150)
TSH SERPL DL<=0.05 MIU/L-ACNC: 1.06 UIU/ML (ref 0.36–3.74)
VLDLC SERPL CALC-MCNC: 16.6 MG/DL

## 2022-11-04 NOTE — PROGRESS NOTES
Reviewed labs  Notable for elevated A1c  Will notify pt via [facility] and plan to review labs/ discuss tx options at upcoming visit

## 2022-11-18 ENCOUNTER — OFFICE VISIT (OUTPATIENT)
Dept: FAMILY MEDICINE CLINIC | Age: 67
End: 2022-11-18
Payer: MEDICARE

## 2022-11-18 VITALS
SYSTOLIC BLOOD PRESSURE: 125 MMHG | RESPIRATION RATE: 16 BRPM | BODY MASS INDEX: 32.13 KG/M2 | HEART RATE: 70 BPM | WEIGHT: 212 LBS | DIASTOLIC BLOOD PRESSURE: 72 MMHG | HEIGHT: 68 IN | OXYGEN SATURATION: 97 %

## 2022-11-18 DIAGNOSIS — M25.50 MULTIPLE JOINT PAIN: ICD-10-CM

## 2022-11-18 DIAGNOSIS — E78.00 HYPERCHOLESTEROLEMIA: ICD-10-CM

## 2022-11-18 DIAGNOSIS — R73.09 ELEVATED HEMOGLOBIN A1C: Primary | ICD-10-CM

## 2022-11-18 DIAGNOSIS — E66.09 CLASS 1 OBESITY DUE TO EXCESS CALORIES WITH SERIOUS COMORBIDITY AND BODY MASS INDEX (BMI) OF 32.0 TO 32.9 IN ADULT: ICD-10-CM

## 2022-11-18 DIAGNOSIS — I10 PRIMARY HYPERTENSION: ICD-10-CM

## 2022-11-18 PROCEDURE — G9717 DOC PT DX DEP/BP F/U NT REQ: HCPCS | Performed by: FAMILY MEDICINE

## 2022-11-18 PROCEDURE — G8417 CALC BMI ABV UP PARAM F/U: HCPCS | Performed by: FAMILY MEDICINE

## 2022-11-18 PROCEDURE — 3017F COLORECTAL CA SCREEN DOC REV: CPT | Performed by: FAMILY MEDICINE

## 2022-11-18 PROCEDURE — 1123F ACP DISCUSS/DSCN MKR DOCD: CPT | Performed by: FAMILY MEDICINE

## 2022-11-18 PROCEDURE — 1090F PRES/ABSN URINE INCON ASSESS: CPT | Performed by: FAMILY MEDICINE

## 2022-11-18 PROCEDURE — G8536 NO DOC ELDER MAL SCRN: HCPCS | Performed by: FAMILY MEDICINE

## 2022-11-18 PROCEDURE — 99214 OFFICE O/P EST MOD 30 MIN: CPT | Performed by: FAMILY MEDICINE

## 2022-11-18 PROCEDURE — G8754 DIAS BP LESS 90: HCPCS | Performed by: FAMILY MEDICINE

## 2022-11-18 PROCEDURE — G8427 DOCREV CUR MEDS BY ELIG CLIN: HCPCS | Performed by: FAMILY MEDICINE

## 2022-11-18 PROCEDURE — 3074F SYST BP LT 130 MM HG: CPT | Performed by: FAMILY MEDICINE

## 2022-11-18 PROCEDURE — G8752 SYS BP LESS 140: HCPCS | Performed by: FAMILY MEDICINE

## 2022-11-18 PROCEDURE — G8400 PT W/DXA NO RESULTS DOC: HCPCS | Performed by: FAMILY MEDICINE

## 2022-11-18 PROCEDURE — G9899 SCRN MAM PERF RSLTS DOC: HCPCS | Performed by: FAMILY MEDICINE

## 2022-11-18 PROCEDURE — 1101F PT FALLS ASSESS-DOCD LE1/YR: CPT | Performed by: FAMILY MEDICINE

## 2022-11-18 PROCEDURE — 3078F DIAST BP <80 MM HG: CPT | Performed by: FAMILY MEDICINE

## 2022-11-18 PROCEDURE — G0463 HOSPITAL OUTPT CLINIC VISIT: HCPCS | Performed by: FAMILY MEDICINE

## 2022-11-18 RX ORDER — METHYLPREDNISOLONE 4 MG/1
TABLET ORAL
COMMUNITY
Start: 2022-11-16

## 2022-11-18 RX ORDER — DULAGLUTIDE 0.75 MG/.5ML
0.75 INJECTION, SOLUTION SUBCUTANEOUS
Qty: 2 ML | Refills: 0 | Status: SHIPPED | OUTPATIENT
Start: 2022-11-18 | End: 2022-12-10

## 2022-11-18 RX ORDER — BIMATOPROST 10 UG/1
IMPLANT INTRACAMERAL
COMMUNITY

## 2022-11-18 NOTE — PROGRESS NOTES
Pipe Cyr is a 78 y.o. female    Chief Complaint   Patient presents with    Weight Loss     Prenisone for tenais in her feet, has been pain free since starting medicine didn't realize how much pain she was in     1. Have you been to the ER, urgent care clinic since your last visit? Hospitalized since your last visit? No  2. Have you seen or consulted any other health care providers outside of the [address] [place] [address] since your last visit? Include any pap smears or colon screening.  No    Visit Vitals  /72 (BP 1 Location: Left upper arm, BP Patient Position: Sitting)   Pulse 70   Resp 16   Ht 5' 8\" (1.727 m)   Wt 212 lb (96.2 kg)   SpO2 97%   BMI 32.23 kg/m²     3 most recent PHQ Screens 10/27/2022   Little interest or pleasure in doing things Not at all   Feeling down, depressed, irritable, or hopeless Not at all   Total Score PHQ 2 0   Trouble falling or staying asleep, or sleeping too much -   Feeling tired or having little energy -   Poor appetite, weight loss, or overeating -   Feeling bad about yourself - or that you are a failure or have let yourself or your family down -   Trouble concentrating on things such as school, work, reading, or watching TV -   Moving or speaking so slowly that other people could have noticed; or the opposite being so fidgety that others notice -   Thoughts of being better off dead, or hurting yourself in some way -   PHQ 9 Score -   How difficult have these problems made it for you to do your work, take care of your home and get along with others -     Health Maintenance Due   Topic Date Due    Breast Cancer Screen Mammogram  06/14/2017    Bone Densitometry (Dexa) Screening  Never done    COVID-19 Vaccine (4 - Booster for Moderna series) 01/12/2022    Flu Vaccine (1) 08/01/2022

## 2022-11-18 NOTE — PROGRESS NOTES
History of Present Illness:     Chief Complaint   Patient presents with    Weight Loss       Tsering Pearson is a 78 y.o. 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 amLODIPine (NORVASC) 5 mg tablet Take 1 Tablet by mouth daily. 90 Tablet 1    venlafaxine-SR (EFFEXOR-XR) 75 mg capsule Take 1 Capsule by mouth daily. 90 Capsule 2    rosuvastatin (CRESTOR) 5 mg tablet TAKE 1 TABLET BY MOUTH EVERY DAY. 90 Tablet 3    aspirin 81 mg chewable tablet Take 81 mg by mouth daily. fluticasone propionate (FLONASE) 50 mcg/actuation nasal spray 2 Sprays by Both Nostrils route daily. 1 Each 3    hydroCHLOROthiazide (HYDRODIURIL) 25 mg tablet Take 1 Tablet by mouth daily.  90 Tablet 3    omeprazole (PRILOSEC) 40 mg capsule omeprazole 40 mg capsule,delayed release   TAKE 1 CAPSULE BY MOUTH EVERY DAY      cholecalciferol (VITAMIN D3) (2,000 UNITS /50 MCG) cap capsule cholecalciferol (vitamin D3) 50 mcg (2,000 unit) capsule   TAKE 1 CAPSULE BY MOUTH EVERY DAY      multivit-min-iron-FA-lutein (Centrum Silver Women) 8 mg iron-400 mcg-300 mcg tab Take  by mouth.      cyanocobalamin 1,000 mcg tablet Take 1,000 mcg by mouth daily. (Patient not taking: Reported on 11/18/2022)      LORazepam (ATIVAN) 0.5 mg tablet TAKE 1 TABLET BY MOUTH EVERY DAY AS NEEDED (Patient not taking: Reported on 11/18/2022) 30 Tablet 0    POTASSIUM PO Take  by mouth. (Patient not taking: Reported on 11/18/2022)      latanoprost (XALATAN) 0.005 % ophthalmic solution latanoprost 0.005 % eye drops   INSTILL 1 DROP INTO BOTH EYES AT BEDTIME AS DIRECTED (Patient not taking: No sig reported)       No current facility-administered medications on file prior to visit. Allergies   Allergen Reactions    Lisinopril Itching     Other reaction(s): RASH, ITCHING    Penicillins Hives, Itching and Swelling     Other reaction(s): RASH    Sulfa (Sulfonamide Antibiotics) Itching     Other reaction(s): RASH, ITCHING         Review of Systems:     Review of Systems   Constitutional:  Negative for chills, fever and weight loss. Gastrointestinal:  Negative for abdominal pain, nausea and vomiting. Musculoskeletal:  Positive for joint pain. Objective:     Vitals:    11/18/22 1617   Weight: 212 lb (96.2 kg)       Physical Exam:  General appearance - alert, well appearing, and in no distress and overweight  Mental status - alert, oriented to person, place, and time, normal mood, behavior, speech, dress, motor activity, and thought processes    Recent Labs:  No results found for this or any previous visit (from the past 12 hour(s)). Assessment and Plan:       ICD-10-CM ICD-9-CM    1. Elevated hemoglobin A1c  R73.09 790.29 dulaglutide (Trulicity) 4.11 KQ/6.2 mL sub-q pen      2. Class 1 obesity due to excess calories with serious comorbidity and body mass index (BMI) of 32.0 to 32.9 in adult  E66.09 278.00     Z68.32 V85.32       3. Hypercholesterolemia  E78.00 272.0       4.  Primary hypertension  I10 401.9           Elevated A1c, in DM range  This is first abnormal reading  Will rpt in 3 mo to confirm dx of DM  Will start treatment with Trulicity 3.68AG daily    Obesity, BMI 32  Current weight: 212 lbs  Co-morbidities: elevated A1c, HLD, HTN  Starting GLP1ra as noted above will help with wt loss  Recommended nutritionist; pt declined for now    HTN, well controlled    Multiple joint pain  Significant improvement while on steroids  Will re-eval when off of the steroids  ? ? Polymyalgia rheumatica    Follow up: 1 month    Harsh Hammond MD    We discussed the expected course, resolution and complications of the diagnosis(es) in detail. Medication risks, benefits, costs, interactions, and alternatives were discussed as indicated. I advised her to contact the office if her condition worsens, changes or fails to improve as anticipated. She expressed understanding with the diagnosis(es) and plan.

## 2022-12-01 DIAGNOSIS — F41.9 ANXIETY: ICD-10-CM

## 2022-12-01 DIAGNOSIS — F13.24: ICD-10-CM

## 2022-12-01 RX ORDER — LORAZEPAM 0.5 MG/1
TABLET ORAL
Qty: 30 TABLET | Refills: 0 | Status: SHIPPED | OUTPATIENT
Start: 2022-12-01

## 2022-12-20 ENCOUNTER — OFFICE VISIT (OUTPATIENT)
Dept: FAMILY MEDICINE CLINIC | Age: 67
End: 2022-12-20
Payer: MEDICARE

## 2022-12-20 VITALS
RESPIRATION RATE: 16 BRPM | HEART RATE: 71 BPM | SYSTOLIC BLOOD PRESSURE: 120 MMHG | WEIGHT: 208 LBS | BODY MASS INDEX: 31.52 KG/M2 | OXYGEN SATURATION: 97 % | DIASTOLIC BLOOD PRESSURE: 78 MMHG | HEIGHT: 68 IN

## 2022-12-20 DIAGNOSIS — R73.09 ELEVATED HEMOGLOBIN A1C: Primary | ICD-10-CM

## 2022-12-20 DIAGNOSIS — E66.09 CLASS 1 OBESITY DUE TO EXCESS CALORIES WITH SERIOUS COMORBIDITY AND BODY MASS INDEX (BMI) OF 32.0 TO 32.9 IN ADULT: ICD-10-CM

## 2022-12-20 DIAGNOSIS — M25.50 MULTIPLE JOINT PAIN: ICD-10-CM

## 2022-12-20 PROCEDURE — G9717 DOC PT DX DEP/BP F/U NT REQ: HCPCS | Performed by: FAMILY MEDICINE

## 2022-12-20 PROCEDURE — G9899 SCRN MAM PERF RSLTS DOC: HCPCS | Performed by: FAMILY MEDICINE

## 2022-12-20 PROCEDURE — G8754 DIAS BP LESS 90: HCPCS | Performed by: FAMILY MEDICINE

## 2022-12-20 PROCEDURE — 99214 OFFICE O/P EST MOD 30 MIN: CPT | Performed by: FAMILY MEDICINE

## 2022-12-20 PROCEDURE — G8536 NO DOC ELDER MAL SCRN: HCPCS | Performed by: FAMILY MEDICINE

## 2022-12-20 PROCEDURE — 1101F PT FALLS ASSESS-DOCD LE1/YR: CPT | Performed by: FAMILY MEDICINE

## 2022-12-20 PROCEDURE — 3017F COLORECTAL CA SCREEN DOC REV: CPT | Performed by: FAMILY MEDICINE

## 2022-12-20 PROCEDURE — G0463 HOSPITAL OUTPT CLINIC VISIT: HCPCS | Performed by: FAMILY MEDICINE

## 2022-12-20 PROCEDURE — G8400 PT W/DXA NO RESULTS DOC: HCPCS | Performed by: FAMILY MEDICINE

## 2022-12-20 PROCEDURE — G8417 CALC BMI ABV UP PARAM F/U: HCPCS | Performed by: FAMILY MEDICINE

## 2022-12-20 PROCEDURE — G8427 DOCREV CUR MEDS BY ELIG CLIN: HCPCS | Performed by: FAMILY MEDICINE

## 2022-12-20 PROCEDURE — 1090F PRES/ABSN URINE INCON ASSESS: CPT | Performed by: FAMILY MEDICINE

## 2022-12-20 PROCEDURE — 1123F ACP DISCUSS/DSCN MKR DOCD: CPT | Performed by: FAMILY MEDICINE

## 2022-12-20 PROCEDURE — G8752 SYS BP LESS 140: HCPCS | Performed by: FAMILY MEDICINE

## 2022-12-20 PROCEDURE — 3074F SYST BP LT 130 MM HG: CPT | Performed by: FAMILY MEDICINE

## 2022-12-20 PROCEDURE — 3078F DIAST BP <80 MM HG: CPT | Performed by: FAMILY MEDICINE

## 2022-12-20 RX ORDER — DULAGLUTIDE 1.5 MG/.5ML
1.5 INJECTION, SOLUTION SUBCUTANEOUS
Qty: 2 ML | Refills: 0 | Status: SHIPPED | OUTPATIENT
Start: 2022-12-20

## 2022-12-20 NOTE — PROGRESS NOTES
History of Present Illness:     Chief Complaint   Patient presents with    Weight Loss       Venkat Pineda is a 78 y.o. female     Weight loss visit    Starting weight: 212 lbs  Current weight: 208 lbs    Started on Trulicity at last visit for elevated A1c  Notes her appetite is down and craving sweets less    Worries that chronic stress is contributing  Looking into ways to help; Lizbeth Alfaro, mindfulness, exercise  Sister is now back with her due to blood clots, now grandson is here    Current diet:   - Working on diet  - Snacking less, thinks due to being busy  - Not a great diet  - Not many fruits and veges  - Trulicity helps cut sweets cravings    Current exercise:  - Occasionally, wants to start exercising more    Barriers:  - Bodily pain  - 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    PMH (REVIEWED):  Past Medical History:   Diagnosis Date    Anxiety     Arthritis     Colon polyps     Depression     Diverticular disease     Glaucoma     Heart murmur     Hemorrhoids     Hypercholesterolemia     Hypertension     Neuropathy     Pineal gland cyst     2 cm    Sleep apnea        Current Medications/Allergies (REVIEWED):     Current Outpatient Medications on File Prior to Visit   Medication Sig Dispense Refill    LORazepam (ATIVAN) 0.5 mg tablet TAKE 1 TABLET BY MOUTH EVERY DAY AS NEEDED 30 Tablet 0    methylPREDNISolone (MEDROL DOSEPACK) 4 mg tablet TAKE AS DIRECTED IN PACKAGE      amLODIPine (NORVASC) 5 mg tablet Take 1 Tablet by mouth daily. 90 Tablet 1    venlafaxine-SR (EFFEXOR-XR) 75 mg capsule Take 1 Capsule by mouth daily. 90 Capsule 2    rosuvastatin (CRESTOR) 5 mg tablet TAKE 1 TABLET BY MOUTH EVERY DAY. 90 Tablet 3    hydroCHLOROthiazide (HYDRODIURIL) 25 mg tablet Take 1 Tablet by mouth daily.  90 Tablet 3    omeprazole (PRILOSEC) 40 mg capsule omeprazole 40 mg capsule,delayed release   TAKE 1 CAPSULE BY MOUTH EVERY DAY      cholecalciferol (VITAMIN D3) (2,000 UNITS /50 MCG) cap capsule cholecalciferol (vitamin D3) 50 mcg (2,000 unit) capsule   TAKE 1 CAPSULE BY MOUTH EVERY DAY      multivit-min-iron-FA-lutein (Centrum Silver Women) 8 mg iron-400 mcg-300 mcg tab Take  by mouth.      bimatoprost (Durysta) 10 mcg impl by IntraCAMeral route. aspirin 81 mg chewable tablet Take 81 mg by mouth daily. fluticasone propionate (FLONASE) 50 mcg/actuation nasal spray 2 Sprays by Both Nostrils route daily. 1 Each 3     No current facility-administered medications on file prior to visit. Allergies   Allergen Reactions    Lisinopril Itching     Other reaction(s): RASH, ITCHING    Penicillins Hives, Itching and Swelling     Other reaction(s): RASH    Sulfa (Sulfonamide Antibiotics) Itching     Other reaction(s): RASH, ITCHING         Review of Systems:     Review of Systems   Constitutional:  Negative for chills, fever and weight loss. Gastrointestinal:  Negative for abdominal pain, nausea and vomiting. Musculoskeletal:  Positive for joint pain. Objective:     Vitals:    12/20/22 1112   BP: 120/78   Pulse: 71   Resp: 16   SpO2: 97%   Weight: 208 lb (94.3 kg)   Height: 5' 8\" (1.727 m)       Physical Exam:  General appearance - alert, well appearing, and in no distress and overweight  Mental status - alert, oriented to person, place, and time, normal mood, behavior, speech, dress, motor activity, and thought processes    Recent Labs:  No results found for this or any previous visit (from the past 12 hour(s)). Assessment and Plan:       ICD-10-CM ICD-9-CM    1. Elevated hemoglobin A1c  R73.09 790.29 dulaglutide (Trulicity) 1.5 QQ/4.6 mL sub-q pen      2. Class 1 obesity due to excess calories with serious comorbidity and body mass index (BMI) of 32.0 to 32.9 in adult  E66.09 278.00     Z68.32 V85.32       3.  Multiple joint pain  M25.50 719.49           Elevated A1c, in DM range  This is first abnormal reading  Will rpt in 3 mo (Feb 2023) to confirm dx of DM  Will increase Trulicity to 4.2CO weekly    Obesity, BMI 32  Starting weight: 212 lbs  Current weight: 208 lbs (-4lbs, 1.8 %)  Co-morbidities: elevated A1c, HLD, HTN  Starting GLP1ra as noted above will help with wt loss    HTN, well controlled, BPs improving    Multiple joint pain  Significant improvement while on steroids  Will re-eval when off of the steroids  ? ? Polymyalgia rheumatica    Follow up: 1 month    Harsh Hammond MD    We discussed the expected course, resolution and complications of the diagnosis(es) in detail. Medication risks, benefits, costs, interactions, and alternatives were discussed as indicated. I advised her to contact the office if her condition worsens, changes or fails to improve as anticipated. She expressed understanding with the diagnosis(es) and plan.

## 2022-12-20 NOTE — PROGRESS NOTES
Bolivar Leos is a 78 y.o. female    No chief complaint on file. 1. Have you been to the ER, urgent care clinic since your last visit? Hospitalized since your last visit? No  2. Have you seen or consulted any other health care providers outside of the [address] [place] [address] since your last visit? Include any pap smears or colon screening.  No    Visit Vitals  Wt 208 lb (94.3 kg)   BMI 31.63 kg/m²     3 most recent PHQ Screens 10/27/2022   Little interest or pleasure in doing things Not at all   Feeling down, depressed, irritable, or hopeless Not at all   Total Score PHQ 2 0   Trouble falling or staying asleep, or sleeping too much -   Feeling tired or having little energy -   Poor appetite, weight loss, or overeating -   Feeling bad about yourself - or that you are a failure or have let yourself or your family down -   Trouble concentrating on things such as school, work, reading, or watching TV -   Moving or speaking so slowly that other people could have noticed; or the opposite being so fidgety that others notice -   Thoughts of being better off dead, or hurting yourself in some way -   PHQ 9 Score -   How difficult have these problems made it for you to do your work, take care of your home and get along with others -     Health Maintenance Due   Topic Date Due    Breast Cancer Screen Mammogram  06/14/2017    Bone Densitometry (Dexa) Screening  Never done    COVID-19 Vaccine (4 - Booster for Moderna series) 01/12/2022    Flu Vaccine (1) 08/01/2022

## 2023-01-05 RX ORDER — AMLODIPINE BESYLATE 5 MG/1
5 TABLET ORAL DAILY
Qty: 90 TABLET | Refills: 1 | Status: SHIPPED | OUTPATIENT
Start: 2023-01-05

## 2023-01-17 ENCOUNTER — HOSPITAL ENCOUNTER (OUTPATIENT)
Dept: MAMMOGRAPHY | Age: 68
Discharge: HOME | End: 2023-01-17
Attending: FAMILY MEDICINE
Payer: MEDICARE

## 2023-01-17 DIAGNOSIS — Z12.31 ENCOUNTER FOR SCREENING MAMMOGRAM FOR MALIGNANT NEOPLASM OF BREAST: ICD-10-CM

## 2023-01-17 DIAGNOSIS — Z78.0 POSTMENOPAUSAL STATE: ICD-10-CM

## 2023-01-17 PROCEDURE — 77080 DXA BONE DENSITY AXIAL: CPT

## 2023-01-17 PROCEDURE — 77063 BREAST TOMOSYNTHESIS BI: CPT

## 2023-01-18 ENCOUNTER — OFFICE VISIT (OUTPATIENT)
Dept: FAMILY MEDICINE CLINIC | Age: 68
End: 2023-01-18
Payer: MEDICARE

## 2023-01-18 VITALS
DIASTOLIC BLOOD PRESSURE: 75 MMHG | SYSTOLIC BLOOD PRESSURE: 116 MMHG | WEIGHT: 203 LBS | OXYGEN SATURATION: 97 % | BODY MASS INDEX: 30.87 KG/M2 | RESPIRATION RATE: 16 BRPM | HEART RATE: 72 BPM

## 2023-01-18 DIAGNOSIS — F33.1 MODERATE EPISODE OF RECURRENT MAJOR DEPRESSIVE DISORDER (HCC): ICD-10-CM

## 2023-01-18 DIAGNOSIS — M25.50 MULTIPLE JOINT PAIN: ICD-10-CM

## 2023-01-18 DIAGNOSIS — R73.09 ELEVATED HEMOGLOBIN A1C: Primary | ICD-10-CM

## 2023-01-18 DIAGNOSIS — E66.09 CLASS 1 OBESITY DUE TO EXCESS CALORIES WITH SERIOUS COMORBIDITY AND BODY MASS INDEX (BMI) OF 30.0 TO 30.9 IN ADULT: ICD-10-CM

## 2023-01-18 PROCEDURE — G0463 HOSPITAL OUTPT CLINIC VISIT: HCPCS | Performed by: FAMILY MEDICINE

## 2023-01-18 PROCEDURE — 1123F ACP DISCUSS/DSCN MKR DOCD: CPT | Performed by: FAMILY MEDICINE

## 2023-01-18 PROCEDURE — 3017F COLORECTAL CA SCREEN DOC REV: CPT | Performed by: FAMILY MEDICINE

## 2023-01-18 PROCEDURE — G9717 DOC PT DX DEP/BP F/U NT REQ: HCPCS | Performed by: FAMILY MEDICINE

## 2023-01-18 PROCEDURE — 99214 OFFICE O/P EST MOD 30 MIN: CPT | Performed by: FAMILY MEDICINE

## 2023-01-18 PROCEDURE — 3078F DIAST BP <80 MM HG: CPT | Performed by: FAMILY MEDICINE

## 2023-01-18 PROCEDURE — 1090F PRES/ABSN URINE INCON ASSESS: CPT | Performed by: FAMILY MEDICINE

## 2023-01-18 PROCEDURE — G8536 NO DOC ELDER MAL SCRN: HCPCS | Performed by: FAMILY MEDICINE

## 2023-01-18 PROCEDURE — 1101F PT FALLS ASSESS-DOCD LE1/YR: CPT | Performed by: FAMILY MEDICINE

## 2023-01-18 PROCEDURE — G8427 DOCREV CUR MEDS BY ELIG CLIN: HCPCS | Performed by: FAMILY MEDICINE

## 2023-01-18 PROCEDURE — G8417 CALC BMI ABV UP PARAM F/U: HCPCS | Performed by: FAMILY MEDICINE

## 2023-01-18 PROCEDURE — 3074F SYST BP LT 130 MM HG: CPT | Performed by: FAMILY MEDICINE

## 2023-01-18 PROCEDURE — G8400 PT W/DXA NO RESULTS DOC: HCPCS | Performed by: FAMILY MEDICINE

## 2023-01-18 PROCEDURE — G9899 SCRN MAM PERF RSLTS DOC: HCPCS | Performed by: FAMILY MEDICINE

## 2023-01-18 RX ORDER — DULAGLUTIDE 3 MG/.5ML
3 INJECTION, SOLUTION SUBCUTANEOUS
Qty: 4 EACH | Refills: 1 | Status: SHIPPED | OUTPATIENT
Start: 2023-01-18

## 2023-01-18 NOTE — PROGRESS NOTES
Chief Complaint   Patient presents with    Weight Loss     1. Have you been to the ER, urgent care clinic since your last visit? Hospitalized since your last visit? No    2. Have you seen or consulted any other health care providers outside of the [address] [place] [address] since your last visit? Include any pap smears or colon screening.  No

## 2023-01-18 NOTE — PROGRESS NOTES
History of Present Illness:     Chief Complaint   Patient presents with    Weight Loss       Abebe Dennis is a 78 y.o. female     Weight loss visit    Starting weight: 212 lbs  Current weight: 203 lbs (home scale, but that was a while ago)    Increased Trulicity to 7.9JH weekly last visit  First day after taking the increased dose, she felt weak and dizzy, fixed after OJ  Now, tolerating fine, doesn't crave sweets, portions down    Stress is better  Wants to take on a more positive attitude     Current diet:   - Eats starting in the afternoon  - Wants to start eating more veges and fruits  - Snacking less, thinks due to being busy  - Trulicity helps cut sweets cravings  - No liquor anymore, still has wine    Current exercise:  - Occasionally, wants to start exercising more, \"at the top of my list\"    Barriers:  - Bodily pain  - 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 90 Tablet 1    LORazepam (ATIVAN) 0.5 mg tablet TAKE 1 TABLET BY MOUTH EVERY DAY AS NEEDED 30 Tablet 0    bimatoprost (Durysta) 10 mcg impl by IntraCAMeral route. venlafaxine-SR (EFFEXOR-XR) 75 mg capsule Take 1 Capsule by mouth daily. 90 Capsule 2    rosuvastatin (CRESTOR) 5 mg tablet TAKE 1 TABLET BY MOUTH EVERY DAY. 90 Tablet 3    aspirin 81 mg chewable tablet Take 81 mg by mouth daily. fluticasone propionate (FLONASE) 50 mcg/actuation nasal spray 2 Sprays by Both Nostrils route daily.  1 Each 3    hydroCHLOROthiazide (HYDRODIURIL) 25 mg tablet Take 1 Tablet by mouth daily. 90 Tablet 3    omeprazole (PRILOSEC) 40 mg capsule omeprazole 40 mg capsule,delayed release   TAKE 1 CAPSULE BY MOUTH EVERY DAY      cholecalciferol (VITAMIN D3) (2,000 UNITS /50 MCG) cap capsule cholecalciferol (vitamin D3) 50 mcg (2,000 unit) capsule   TAKE 1 CAPSULE BY MOUTH EVERY DAY      multivit-min-iron-FA-lutein (Centrum Silver Women) 8 mg iron-400 mcg-300 mcg tab Take  by mouth. No current facility-administered medications on file prior to visit. Allergies   Allergen Reactions    Lisinopril Itching     Other reaction(s): RASH, ITCHING    Penicillins Hives, Itching and Swelling     Other reaction(s): RASH    Sulfa (Sulfonamide Antibiotics) Itching     Other reaction(s): RASH, ITCHING         Review of Systems:     Review of Systems   Constitutional:  Negative for chills, fever and weight loss. Gastrointestinal:  Negative for abdominal pain, nausea and vomiting. Musculoskeletal:  Positive for joint pain. Objective:     Vitals:    01/18/23 1254   BP: 116/75   Pulse: 72   Resp: 16   SpO2: 97%   Weight: 203 lb (92.1 kg)       Physical Exam:  General appearance - alert, well appearing, and in no distress and overweight  Mental status - alert, oriented to person, place, and time, normal mood, behavior, speech, dress, motor activity, and thought processes    Recent Labs:  No results found for this or any previous visit (from the past 12 hour(s)). Assessment and Plan:       ICD-10-CM ICD-9-CM    1. Elevated hemoglobin A1c  R73.09 790.29 dulaglutide (Trulicity) 3 JS/8.9 mL pnij      2. Moderate episode of recurrent major depressive disorder (HCC)  F33.1 296.32       3. Multiple joint pain  M25.50 719.49       4.  Class 1 obesity due to excess calories with serious comorbidity and body mass index (BMI) of 30.0 to 30.9 in adult  E66.09 278.00     Z68.30 V85.30           Elevated A1c, in DM range  This is first abnormal reading  Will rpt in 3 mo (Feb 2023) to confirm dx of DM  Will increase Trulicity to 3mg weekly    Obesity, BMI 32 --> 30.9  Starting weight: 212 lbs  Current weight: 203 lbs (-9lbs, 4.2 %)  Co-morbidities: elevated A1c, HLD, HTN  Starting GLP1ra as noted above will help with wt loss    HTN, well controlled  Will consider reducing med burden at next visit if BPs remain good    Multiple joint pain  Continues to be better  Attributes improvement to wt loss    Depression, improving  Mood is much better   Continue Effexor    GERD, improving  Using her PPI less of    Follow up: 1 month    Bernarda Lorenzo MD    We discussed the expected course, resolution and complications of the diagnosis(es) in detail. Medication risks, benefits, costs, interactions, and alternatives were discussed as indicated. I advised her to contact the office if her condition worsens, changes or fails to improve as anticipated. She expressed understanding with the diagnosis(es) and plan.

## 2023-01-23 ENCOUNTER — PATIENT MESSAGE (OUTPATIENT)
Dept: FAMILY MEDICINE CLINIC | Age: 68
End: 2023-01-23

## 2023-01-23 DIAGNOSIS — R73.09 ELEVATED HEMOGLOBIN A1C: Primary | ICD-10-CM

## 2023-01-26 NOTE — TELEPHONE ENCOUNTER
From: Tate Mccormack  To: Irina Lorenzo MD  Sent: 1/23/2023 1:03 PM EST  Subject: Trulicity 3mg    Hi Dr. Rohan Edmonds    I have been looking for this rx since our last visit and it is not in stock ([facility], [facility]). It's on back order and no one knows when it will be available. You may remember that [facility] gave me a box of 2 .75 mg Trulicity while I waiting for their to arrive then dispensed a box 4 two weeks later. So I held on to the 2 left over injectors. On yesterday I used both of them which I hope will give me the 1.5 mg that I have been on. Let me know how to proceed.

## 2023-02-22 DIAGNOSIS — F41.9 ANXIETY: ICD-10-CM

## 2023-02-22 DIAGNOSIS — F13.24: ICD-10-CM

## 2023-02-22 RX ORDER — LORAZEPAM 0.5 MG/1
TABLET ORAL
Qty: 30 TABLET | Refills: 0 | Status: SHIPPED | OUTPATIENT
Start: 2023-02-22

## 2023-02-22 NOTE — TELEPHONE ENCOUNTER
Refill Request Received for the Following Medication     Requested Prescriptions     Pending Prescriptions Disp Refills    LORazepam (ATIVAN) 0.5 mg tablet 30 Tablet 0     Sig: TAKE 1 TABLET BY MOUTH EVERY DAY AS NEEDED       Last Prescribed:  12/01/2022    Last Appointment With Me:  1/18/2023     Future Appointments:  Future Appointments   Date Time Provider Daniella Villasenor   2/24/2023  1:20 PM Yue Huerta MD [facility] AMB

## 2023-02-24 ENCOUNTER — OFFICE VISIT (OUTPATIENT)
Dept: FAMILY MEDICINE CLINIC | Age: 68
End: 2023-02-24
Payer: MEDICARE

## 2023-02-24 VITALS
DIASTOLIC BLOOD PRESSURE: 73 MMHG | SYSTOLIC BLOOD PRESSURE: 126 MMHG | RESPIRATION RATE: 17 BRPM | WEIGHT: 200 LBS | HEART RATE: 70 BPM | HEIGHT: 68 IN | TEMPERATURE: 97.8 F | OXYGEN SATURATION: 98 % | BODY MASS INDEX: 30.31 KG/M2

## 2023-02-24 DIAGNOSIS — I10 PRIMARY HYPERTENSION: ICD-10-CM

## 2023-02-24 DIAGNOSIS — E66.09 CLASS 1 OBESITY DUE TO EXCESS CALORIES WITH SERIOUS COMORBIDITY AND BODY MASS INDEX (BMI) OF 30.0 TO 30.9 IN ADULT: ICD-10-CM

## 2023-02-24 DIAGNOSIS — M79.671 RIGHT FOOT PAIN: ICD-10-CM

## 2023-02-24 DIAGNOSIS — R73.09 ELEVATED HEMOGLOBIN A1C: Primary | ICD-10-CM

## 2023-02-24 DIAGNOSIS — Q79.59: ICD-10-CM

## 2023-02-24 LAB — HBA1C MFR BLD HPLC: 5.8 %

## 2023-02-24 NOTE — PROGRESS NOTES
History of Present Illness:     Chief Complaint   Patient presents with    Weight Management    Referral Request     Referral to Podiatry Second Opinion and Evaluation of Right Foot Pain and Plastic Surgeon Umbilical Repair        Cassie Arenas is a 78 y.o. female     Weight loss visit    Starting weight: 212 lbs  Current weight: 198.6 lbs (home scale, but that was a while ago)    Increased Trulicity to 0.8CH weekly last visit  Trulicity 3mg has not yet been available    Stress is better  Wants to take on a more positive attitude     Current diet:   - Eats starting in the afternoon  - Wants to start eating more veges and fruits  - Snacking less, thinks due to being busy  - Trulicity helps cut sweets cravings  - No liquor anymore, still has wine    Current exercise:  - Occasionally, wants to start exercising more, \"at the top of my list\"    Barriers:  - Bodily pain  - 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 90 Tablet 1    bimatoprost (Durysta) 10 mcg impl by IntraCAMeral route. venlafaxine-SR (EFFEXOR-XR) 75 mg capsule Take 1 Capsule by mouth daily. 90 Capsule 2    rosuvastatin (CRESTOR) 5 mg tablet TAKE 1 TABLET BY MOUTH EVERY DAY. 90 Tablet 3    aspirin 81 mg chewable tablet Take 81 mg by mouth daily. fluticasone propionate (FLONASE) 50 mcg/actuation nasal spray 2 Sprays by Both Nostrils route daily.  1 Each 3 hydroCHLOROthiazide (HYDRODIURIL) 25 mg tablet Take 1 Tablet by mouth daily. 90 Tablet 3    omeprazole (PRILOSEC) 40 mg capsule omeprazole 40 mg capsule,delayed release   TAKE 1 CAPSULE BY MOUTH EVERY DAY      cholecalciferol (VITAMIN D3) (2,000 UNITS /50 MCG) cap capsule cholecalciferol (vitamin D3) 50 mcg (2,000 unit) capsule   TAKE 1 CAPSULE BY MOUTH EVERY DAY      multivit-min-iron-FA-lutein (Centrum Silver Women) 8 mg iron-400 mcg-300 mcg tab Take  by mouth. No current facility-administered medications on file prior to visit. Allergies   Allergen Reactions    Lisinopril Itching     Other reaction(s): RASH, ITCHING    Penicillins Hives, Itching and Swelling     Other reaction(s): RASH    Sulfa (Sulfonamide Antibiotics) Itching     Other reaction(s): RASH, ITCHING         Review of Systems:     Review of Systems   Constitutional:  Negative for chills, fever and weight loss. Gastrointestinal:  Negative for abdominal pain, nausea and vomiting. Musculoskeletal:  Positive for joint pain. Objective:     Vitals:    02/24/23 1310   BP: 126/73   Pulse: 70   Resp: 17   Temp: 97.8 °F (36.6 °C)   TempSrc: Oral   SpO2: 98%   Weight: 200 lb (90.7 kg)   Height: 5' 8\" (1.727 m)       Physical Exam:  General appearance - alert, well appearing, and in no distress and overweight  Mental status - alert, oriented to person, place, and time, normal mood, behavior, speech, dress, motor activity, and thought processes    Recent Labs:  Recent Results (from the past 12 hour(s))   AMB POC HEMOGLOBIN A1C    Collection Time: 02/24/23  2:10 PM   Result Value Ref Range    Hemoglobin A1c (POC) 5.8 %       Assessment and Plan:       ICD-10-CM ICD-9-CM    1. Elevated hemoglobin A1c  R73.09 790.29 AMB POC HEMOGLOBIN A1C      dulaglutide (TRULICITY) 1.5 LL/0.2 mL sub-q pen      2.  Class 1 obesity due to excess calories with serious comorbidity and body mass index (BMI) of 30.0 to 30.9 in adult  E66.09 278.00     Z68.30 V85.30 3. Primary hypertension  I10 401.9       4. Right foot pain  M79.671 729.5 REFERRAL TO PODIATRY      5. Congenital umbilical defect  Z89.78 756.79 REFERRAL TO PLASTIC SURGERY          Elevated A1c, in DM range  11/1/22 was first abnormal reading  Rpt A1c 5.8  Will consider pre-DM for working dx  Will increase Trulicity to 3mg weekly    Obesity, BMI 32 --> 30.9 --> 30.41  Starting weight: 212 lbs  Current weight: 200 lbs (-12lbs, 5.7%)  Co-morbidities: elevated A1c, HLD, HTN  Starting GLP1ra as noted above will help with wt loss    HTN, well controlled  Will continue current meds for time being    Depression, currently active  Under a lot of stress with her sister now being on Hospice  Continue Effexor  Has Ativan PRN but we discussed safe use as she admits to picking up on drinking  We discussed alternative methods for managing stress    GERD, worsening  Needing her Omeprazole   Does not need refills    Follow up: 3 month    Nancy Neely MD    We discussed the expected course, resolution and complications of the diagnosis(es) in detail. Medication risks, benefits, costs, interactions, and alternatives were discussed as indicated. I advised her to contact the office if her condition worsens, changes or fails to improve as anticipated. She expressed understanding with the diagnosis(es) and plan.

## 2023-02-24 NOTE — PROGRESS NOTES
Chief Complaint   Patient presents with    Weight Management    Referral Request     Referral to Podiatry Second Opinion and Evaluation of Right Foot Pain and Plastic Surgeon Umbilical Repair      Visit Vitals  /73 (BP 1 Location: Right upper arm, BP Patient Position: Sitting, BP Cuff Size: Large adult)   Pulse 70   Temp 97.8 °F (36.6 °C) (Oral)   Resp 17   Ht 5' 8\" (1.727 m)   Wt 200 lb (90.7 kg)   SpO2 98%   BMI 30.41 kg/m²       1. Have you been to the ER, urgent care clinic since your last visit? Hospitalized since your last visit? No    2. Have you seen or consulted any other health care providers outside of the [address] [place] [address] since your last visit? Include any pap smears or colon screening.  No    Lab Results   Component Value Date/Time    Hemoglobin A1c 6.9 (H) 11/01/2022 09:45 AM    Hemoglobin A1c (POC) 5.8 02/24/2023 02:10 PM

## 2023-03-23 ENCOUNTER — OFFICE VISIT (OUTPATIENT)
Dept: FAMILY MEDICINE CLINIC | Age: 68
End: 2023-03-23
Payer: MEDICARE

## 2023-03-23 VITALS
WEIGHT: 197.8 LBS | SYSTOLIC BLOOD PRESSURE: 101 MMHG | RESPIRATION RATE: 16 BRPM | HEIGHT: 68 IN | TEMPERATURE: 98.6 F | BODY MASS INDEX: 29.98 KG/M2 | OXYGEN SATURATION: 98 % | HEART RATE: 80 BPM | DIASTOLIC BLOOD PRESSURE: 66 MMHG

## 2023-03-23 DIAGNOSIS — F13.24: ICD-10-CM

## 2023-03-23 DIAGNOSIS — F41.9 ANXIETY: ICD-10-CM

## 2023-03-23 DIAGNOSIS — E66.09 CLASS 1 OBESITY DUE TO EXCESS CALORIES WITH SERIOUS COMORBIDITY AND BODY MASS INDEX (BMI) OF 30.0 TO 30.9 IN ADULT: ICD-10-CM

## 2023-03-23 DIAGNOSIS — R73.09 ELEVATED HEMOGLOBIN A1C: Primary | ICD-10-CM

## 2023-03-23 DIAGNOSIS — L85.1 PLANTAR POROKERATOSIS, ACQUIRED: ICD-10-CM

## 2023-03-23 DIAGNOSIS — M79.671 RIGHT FOOT PAIN: ICD-10-CM

## 2023-03-23 DIAGNOSIS — G89.29 CHRONIC PAIN OF RIGHT KNEE: ICD-10-CM

## 2023-03-23 DIAGNOSIS — M25.561 CHRONIC PAIN OF RIGHT KNEE: ICD-10-CM

## 2023-03-23 PROCEDURE — G0463 HOSPITAL OUTPT CLINIC VISIT: HCPCS | Performed by: FAMILY MEDICINE

## 2023-03-23 RX ORDER — DULAGLUTIDE 3 MG/.5ML
3 INJECTION, SOLUTION SUBCUTANEOUS
Qty: 4 EACH | Refills: 2 | Status: SHIPPED | OUTPATIENT
Start: 2023-03-23

## 2023-03-23 RX ORDER — LORAZEPAM 0.5 MG/1
TABLET ORAL
Qty: 30 TABLET | Refills: 0 | Status: CANCELLED | OUTPATIENT
Start: 2023-03-23

## 2023-03-23 NOTE — PROGRESS NOTES
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 Being followed by podiatry x one year. Pain 8/10 upon applying pressure to foot. Visit Vitals  /66 (BP 1 Location: Right upper arm, BP Patient Position: Sitting, BP Cuff Size: Large adult)   Pulse 80   Temp 98.6 °F (37 °C) (Oral)   Resp 16   Ht 5' 8\" (1.727 m)   Wt 197 lb 12.8 oz (89.7 kg)   SpO2 98%   BMI 30.08 kg/m²       1. Have you been to the ER, urgent care clinic since your last visit? Hospitalized since your last visit? No    2. Have you seen or consulted any other health care providers outside of the [address] [place] [address] since your last visit? Include any pap smears or colon screening.   Podiatrist Dr. Joann Deleon, [address] [facility] and [address]

## 2023-03-23 NOTE — PROGRESS NOTES
History of Present Illness:     Chief Complaint   Patient presents with    Weight Management    Medication Refill    Foot Pain     Porokeratosis located on the bottom of right foot, now experiencing pain in the right knee and right hip.pain worse with standing and ambulation. Being followed by podiatry x one year. Pain 8/10 upon applying pressure to foot. Sahil Regan is a 78 y.o. 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    Anxiety  Needs refill of Ativan    Foot pain  Followed by a Podiatrist  Has had numerous procedures without relief   Now resulting in knee and hip pain due to change in her gait    PMH (REVIEWED):  Past Medical History:   Diagnosis Date    Anxiety     Arthritis     Colon polyps     Depression     Diverticular disease     Glaucoma     Heart murmur     Hemorrhoids     Hypercholesterolemia     Hypertension     Neuropathy     Pineal gland cyst     2 cm    Sleep apnea        Current Medications/Allergies (REVIEWED):     Current Outpatient Medications on File Prior to Visit   Medication Sig Dispense Refill    LORazepam (ATIVAN) 0.5 mg tablet TAKE 1 TABLET BY MOUTH EVERY DAY AS NEEDED 30 Tablet 0    amLODIPine (NORVASC) 5 mg tablet Take 1 Tablet by mouth daily. 90 Tablet 1    bimatoprost (Durysta) 10 mcg impl by IntraCAMeral route. venlafaxine-SR (EFFEXOR-XR) 75 mg capsule Take 1 Capsule by mouth daily.  90 Capsule 2 rosuvastatin (CRESTOR) 5 mg tablet TAKE 1 TABLET BY MOUTH EVERY DAY. 90 Tablet 3    aspirin 81 mg chewable tablet Take 81 mg by mouth daily. fluticasone propionate (FLONASE) 50 mcg/actuation nasal spray 2 Sprays by Both Nostrils route daily. 1 Each 3    hydroCHLOROthiazide (HYDRODIURIL) 25 mg tablet Take 1 Tablet by mouth daily. 90 Tablet 3    omeprazole (PRILOSEC) 40 mg capsule omeprazole 40 mg capsule,delayed release   TAKE 1 CAPSULE BY MOUTH EVERY DAY      cholecalciferol (VITAMIN D3) (2,000 UNITS /50 MCG) cap capsule cholecalciferol (vitamin D3) 50 mcg (2,000 unit) capsule   TAKE 1 CAPSULE BY MOUTH EVERY DAY      multivit-min-iron-FA-lutein (Centrum Silver Women) 8 mg iron-400 mcg-300 mcg tab Take  by mouth. No current facility-administered medications on file prior to visit. Allergies   Allergen Reactions    Lisinopril Itching     Other reaction(s): RASH, ITCHING    Penicillins Hives, Itching and Swelling     Other reaction(s): RASH    Sulfa (Sulfonamide Antibiotics) Itching     Other reaction(s): RASH, ITCHING         Review of Systems:     Review of Systems   Constitutional:  Negative for chills, fever and weight loss. Gastrointestinal:  Negative for abdominal pain, nausea and vomiting. Musculoskeletal:  Positive for joint pain. Objective:     Vitals:    03/23/23 1110   BP: 101/66   Pulse: 80   Resp: 16   Temp: 98.6 °F (37 °C)   TempSrc: Oral   SpO2: 98%   Weight: 197 lb 12.8 oz (89.7 kg)   Height: 5' 8\" (1.727 m)       Physical Exam:  General appearance - alert, well appearing, and in no distress and overweight  Mental status - alert, oriented to person, place, and time, normal mood, behavior, speech, dress, motor activity, and thought processes  MSK - Abnormal gait, antalgic due to right foot pain. Recent Labs:  No results found for this or any previous visit (from the past 12 hour(s)). Assessment and Plan:       ICD-10-CM ICD-9-CM    1.  Elevated hemoglobin A1c  R73.09 790.29 dulaglutide (Trulicity) 3 TAMY/4.8 mL pnij      2. Class 1 obesity due to excess calories with serious comorbidity and body mass index (BMI) of 30.0 to 30.9 in adult  E66.09 278.00     Z68.30 V85.30       3. Anxiety  F41.9 300.00       4. 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 Right foot pain  M79.671 729.5 REFERRAL TO PODIATRY      6. Plantar porokeratosis, acquired  L85.1 701.1 REFERRAL TO PODIATRY      7. Chronic pain of right knee  M25.561 719.46 REFERRAL TO PHYSICAL THERAPY    G89.29 338.29           Elevated A1c, in DM range  11/1/22 was first abnormal reading  Rpt A1c 5.8  Will consider pre-DM for working dx  Continue Trulicity to 3mg weekly    Obesity, BMI 32 --> 30.9 --> 30.41 --> 30.08  Starting weight: 212 lbs  Current weight: 197 lbs (-15lbs, 7.1%)  Co-morbidities: elevated A1c, HLD, HTN  Starting GLP1ra as noted above will help with wt loss    HTN, well controlled  Will continue current meds for time being    Depression, currently active  Under a lot of stress with her sister now being on Hospice  Continue Effexor at current dose, pt declined increase  Has Ativan PRN but we discussed safe use as she admits to picking up on drinking  We discussed alternative methods for managing stress  Reviewed PDMP; pt should not yet need refills    Right foot pain due to plantar porokeratosis  Followed by Podiatry, no improvement following numerous procedures  Would like a second opinion, referral sent    Refer to Sports Med for knee pain  Would likely benefit form PT as well but will start with Sports Med eval    Follow up: 3 month    True Sanchez MD    We discussed the expected course, resolution and complications of the diagnosis(es) in detail. Medication risks, benefits, costs, interactions, and alternatives were discussed as indicated. I advised her to contact the office if her condition worsens, changes or fails to improve as anticipated. She expressed understanding with the diagnosis(es) and plan.

## 2023-03-27 ENCOUNTER — PATIENT MESSAGE (OUTPATIENT)
Dept: FAMILY MEDICINE CLINIC | Age: 68
End: 2023-03-27

## 2023-03-27 DIAGNOSIS — R73.09 ELEVATED HEMOGLOBIN A1C: Primary | ICD-10-CM

## 2023-03-27 RX ORDER — DULAGLUTIDE 4.5 MG/.5ML
4.5 INJECTION, SOLUTION SUBCUTANEOUS
Qty: 4 EACH | Refills: 1 | Status: SHIPPED | OUTPATIENT
Start: 2023-03-27

## 2023-03-27 NOTE — TELEPHONE ENCOUNTER
Vish Carroll, [place] 9/75/1619 6:01 PM EDT      ----- Message -----  From: Morena Guerra  Sent: 3/27/2023 4:01 PM EDT  To: VALERIANO Nurse  Subject: Trulicity issues     Hi Dr. Maxine Hough. I found out from the pharmacy that my insurance will only allow 1 box of Trulicity per 30 days regardless of the dosage. My insurance company confirmed this and I was told that unavailable drugs would need a substitute drug that is available. I picked up one box. In 2 weeks would you consider raising my dosage to 4.5 mg, which is available at present. I confirmed that this substitution is covered even though it's for the same month.

## 2023-03-27 NOTE — TELEPHONE ENCOUNTER
Pt stated that [address] [place] [address] does not have Trulicity. She stated that [facility] [address], whom informed [facility] that they [facility] it, but the Rx would have to be written for 2 boxes of 1.5 mg, instead of 3 mg. Thank you.

## 2023-03-28 NOTE — PROGRESS NOTES
[address] [facility]      Chief Complaint:   Chief Complaint   Patient presents with    Hip Pain     Patient with right hip and right knee pain. Patient has been limping because of a foot issue. This has started the pain in hip. Patient with chronic right knee pain and has had injections for this before. No known trauma. Currently pain is 3/10. ASSESSMENT:    ICD-10-CM ICD-9-CM    1. Chronic pain of right knee  M25.561 719.46 XR KNEE RT MIN 4 V    G89.29 338.29 DRAIN/INJECT LARGE JOINT/BURSA      DRAIN/INJECT LARGE JOINT/BURSA      2. Greater trochanteric pain syndrome  M25.559 719.45 DRAIN/INJECT LARGE JOINT/BURSA      DRAIN/INJECT LARGE JOINT/BURSA      3. Osteoarthritis of right knee, unspecified osteoarthritis type  M17.11 715.96         PLAN:  Options Discussed   XR right knee in clinic with severe tricompartmental OA  US guided injection to the right GTB and right knee   When time is right, she will consider a referral for right knee TKA  Number to podiatrist referred by Dr. Chela Morel given to follow-up about foot pain which will likely help with gait and some pain   Follow-up to this clinic as needed for repeat injections     HPI:  Neris Samayoa is a 78 y.o. female who presents with right hip and knee pain. Has had several injections to right knee in remote past that were helpful. She has been dealing with a fair bit of knee pain chronically but the hip pain is new. She is working with Dr. Chela Morel and was referred to podiatry for a porokeratosis on bottom of foot that causes some gait dysfunction and with a right valgus knee is likely causing some pain up into the hip. She notes her  recently had a stroke and she is caring for him while also caring for sister who is on hospice. While TKA is on the table and she is considering, she cannot take the recovery time for herself as she needs to look after family.  Today she would like to do some injections to help manage the pain. Most recent A1c is 6.9 (11/2022)     Past Medical History:   Diagnosis Date    Anxiety     Arthritis     Colon polyps     Depression     Diverticular disease     Glaucoma     Heart murmur     Hemorrhoids     Hypercholesterolemia     Hypertension     Neuropathy     Pineal gland cyst     2 cm    Sleep apnea        Current Outpatient Medications:     dulaglutide (TRULICITY) 1.5 FR/9.8 mL sub-q pen, 1 mL by SubCUTAneous route every seven (7) days. , Disp: 8 Each, Rfl: 3    dulaglutide (Trulicity) 4.5 XQ/2.6 mL pnij, 4.5 mg by SubCUTAneous route every seven (7) days. , Disp: 4 Each, Rfl: 1    LORazepam (ATIVAN) 0.5 mg tablet, TAKE 1 TABLET BY MOUTH EVERY DAY AS NEEDED, Disp: 30 Tablet, Rfl: 0    amLODIPine (NORVASC) 5 mg tablet, Take 1 Tablet by mouth daily. , Disp: 90 Tablet, Rfl: 1    bimatoprost (Durysta) 10 mcg impl, by IntraCAMeral route., Disp: , Rfl:     venlafaxine-SR (EFFEXOR-XR) 75 mg capsule, Take 1 Capsule by mouth daily. , Disp: 90 Capsule, Rfl: 2    rosuvastatin (CRESTOR) 5 mg tablet, TAKE 1 TABLET BY MOUTH EVERY DAY., Disp: 90 Tablet, Rfl: 3    aspirin 81 mg chewable tablet, Take 81 mg by mouth daily. , Disp: , Rfl:     fluticasone propionate (FLONASE) 50 mcg/actuation nasal spray, 2 Sprays by Both Nostrils route daily. , Disp: 1 Each, Rfl: 3    hydroCHLOROthiazide (HYDRODIURIL) 25 mg tablet, Take 1 Tablet by mouth daily. , Disp: 90 Tablet, Rfl: 3    omeprazole (PRILOSEC) 40 mg capsule, omeprazole 40 mg capsule,delayed release  TAKE 1 CAPSULE BY MOUTH EVERY DAY, Disp: , Rfl:     cholecalciferol (VITAMIN D3) (2,000 UNITS /50 MCG) cap capsule, cholecalciferol (vitamin D3) 50 mcg (2,000 unit) capsule  TAKE 1 CAPSULE BY MOUTH EVERY DAY, Disp: , Rfl:     multivit-min-iron-FA-lutein (Centrum Silver Women) 8 mg iron-400 mcg-300 mcg tab, Take  by mouth., Disp: , Rfl:   No current facility-administered medications for this visit.   Allergies   Allergen Reactions Lisinopril Itching     Other reaction(s): RASH, ITCHING    Penicillins Hives, Itching and Swelling     Other reaction(s): RASH    Sulfa (Sulfonamide Antibiotics) Itching     Other reaction(s): RASH, ITCHING     Past Medical History:   Diagnosis Date    Anxiety     Arthritis     Colon polyps     Depression     Diverticular disease     Glaucoma     Heart murmur     Hemorrhoids     Hypercholesterolemia     Hypertension     Neuropathy     Pineal gland cyst     2 cm    Sleep apnea      Family History   Problem Relation Age of Onset    Cancer Mother     Neuropathy Mother     Glaucoma Mother     Hypertension Mother     Breast Cancer Sister 78    Hypertension Sister     Breast Cancer Sister         around 48    Colon Cancer Sister     Heart Attack Sister     Cancer Father     Liver Disease Father     Neuropathy Father     Hypertension Father     Heart Disease Brother     Hypertension Brother        ROS: As per HPI otherwise negative. Objective:   Visit Vitals  /74 (BP 1 Location: Right arm, BP Patient Position: Sitting, BP Cuff Size: Adult)   Pulse 73   Temp 98.1 °F (36.7 °C) (Oral)   Resp 18   Ht 5' 8\" (1.727 m)   Wt 198 lb (89.8 kg)   SpO2 97%   BMI 30.11 kg/m²     Gen: Well appearing. No apparent distress. Alert and oriented. Responds to all questions appropriately. Lungs: No labored respirations. Talking in complete sentences without difficulty. CV: No edema. Skin: No obvious acute rash or skin breakdown.     Musculoskeletal:    Knee: right  Varus/Valgus: Valgus (Left neutral)   Knee Effusion: Mild  Quadriceps atrophy: None   Popliteal (Bakers) Cyst: Negative   Patellofemoral crepitus: Present    ROM:  Flexion: 110  Extension: 10 degree lag   Hip IR/ER: FROM without pain    Dynamic Test:  Gait: Antalgic   Assistive devices: None    Palpation:   Patella tenderness: None  Patellar tendon tenderness: None  Quad tendon tenderness: None  Medial joint line tenderness: Mild  Lateral joint line tenderness: Mild    Ligament/Meniscal Exam:  Patellar Grind: Negative   Patellar apprehension (medial/lateral): Negative   Lachman: Negative, with good endpoint   Valgus stress: Negative with good endpoint   Varus stress: Negative with good endpoint     Strength (0-5/5):   Hip Flexion: Left 5/5    Right: 4+/5   Knee Flexion: Left: 5/5    Right: 5/5    Knee Extension: Left: 5/5    Right: 5/5    Hip abduction: 5/5    Hip adduction: 5/5      Hip right:   Palpation:   Greater Trochanter: Very tender      Special test:     Stinchfield: Left: Negative  Right: Negative   SHIRA: Left: Negative  Right: Negative   FADIR:  Left: Negative  Right: Positive    Scour:   Left: Negative  Right: Negative     Sensation: Report some sensory changes in bilateral feet from DM neuropathy     Imaging: Right Knee XR is notable for tricompartmental OA, worse in lateral and patellofemoral joints     PROCEDURE NOTE:     Informed consent obtained verbally and risks, benefits and alternatives discussed. Time out performed, cross checking patient ID and procedure. The right hip was cleaned and prepped with sterile technique using Chloraprep x3, anesthetized with ethyl chloride spray and the trochanteric bursa was injected with Kenalog 40 mg and 1 ml of 1% lidocaine. After injecting the greater trochanter, the right knee was cleaned and prepped with sterile technique using Chloraprep x3 and anesthetized with ethyl chloride spray. The patella, quad tendon, femur and the superior joint space were identified with ultrasound and the knee was injected from a superior-lateral approach with Kenalog 40mg and 3ml of 1% lidocaine under sterile conditions using ultrasound guidance. The patient tolerated the procedures well and there were no complications. She reports good relief from both injections.      Discussed with attending physician, Dr. Martell Rivera, DO  Sports Medicine Fellow [employer]

## 2023-03-29 ENCOUNTER — OFFICE VISIT (OUTPATIENT)
Dept: FAMILY MEDICINE CLINIC | Age: 68
End: 2023-03-29

## 2023-03-29 VITALS
RESPIRATION RATE: 18 BRPM | TEMPERATURE: 98.1 F | BODY MASS INDEX: 30.01 KG/M2 | HEART RATE: 73 BPM | HEIGHT: 68 IN | OXYGEN SATURATION: 97 % | SYSTOLIC BLOOD PRESSURE: 118 MMHG | DIASTOLIC BLOOD PRESSURE: 74 MMHG | WEIGHT: 198 LBS

## 2023-03-29 DIAGNOSIS — M25.561 CHRONIC PAIN OF RIGHT KNEE: Primary | ICD-10-CM

## 2023-03-29 DIAGNOSIS — G89.29 CHRONIC PAIN OF RIGHT KNEE: Primary | ICD-10-CM

## 2023-03-29 DIAGNOSIS — M17.11 OSTEOARTHRITIS OF RIGHT KNEE, UNSPECIFIED OSTEOARTHRITIS TYPE: ICD-10-CM

## 2023-03-29 DIAGNOSIS — M25.559 GREATER TROCHANTERIC PAIN SYNDROME: ICD-10-CM

## 2023-03-29 RX ORDER — TRIAMCINOLONE ACETONIDE 40 MG/ML
40 INJECTION, SUSPENSION INTRA-ARTICULAR; INTRAMUSCULAR ONCE
Status: COMPLETED | OUTPATIENT
Start: 2023-03-29 | End: 2023-03-30

## 2023-03-29 RX ORDER — LIDOCAINE HYDROCHLORIDE 10 MG/ML
3 INJECTION INFILTRATION; PERINEURAL ONCE
Status: COMPLETED | OUTPATIENT
Start: 2023-03-29 | End: 2023-03-30

## 2023-03-29 RX ORDER — LIDOCAINE HYDROCHLORIDE 10 MG/ML
1 INJECTION INFILTRATION; PERINEURAL ONCE
Status: COMPLETED | OUTPATIENT
Start: 2023-03-29 | End: 2023-03-30

## 2023-03-30 RX ADMIN — LIDOCAINE HYDROCHLORIDE 1 ML: 10 INJECTION INFILTRATION; PERINEURAL at 08:54

## 2023-03-30 RX ADMIN — TRIAMCINOLONE ACETONIDE 40 MG: 40 INJECTION, SUSPENSION INTRA-ARTICULAR; INTRAMUSCULAR at 08:56

## 2023-03-30 RX ADMIN — LIDOCAINE HYDROCHLORIDE 3 ML: 10 INJECTION INFILTRATION; PERINEURAL at 08:55

## 2023-03-31 RX ORDER — HYDROCHLOROTHIAZIDE 25 MG/1
TABLET ORAL
Qty: 90 TABLET | Refills: 3 | Status: SHIPPED | OUTPATIENT
Start: 2023-03-31

## 2023-04-03 ENCOUNTER — PATIENT MESSAGE (OUTPATIENT)
Dept: FAMILY MEDICINE CLINIC | Age: 68
End: 2023-04-03

## 2023-04-04 RX ORDER — DULAGLUTIDE 4.5 MG/.5ML
4.5 INJECTION, SOLUTION SUBCUTANEOUS
Qty: 4 EACH | Refills: 1 | Status: SHIPPED
Start: 2023-04-04

## 2023-04-04 NOTE — TELEPHONE ENCOUNTER
From: Sergio Guardado  To: Sindi Bocanegra MD  Sent: 4/3/2023 4:05 PM EDT  Subject: Trulicity    Hi again. [facility])  and neither has an rx for 4.5 mg. Can I come in and  a written prescription? [facility]. Thank you.

## 2023-05-01 DIAGNOSIS — R73.09 ELEVATED HEMOGLOBIN A1C: ICD-10-CM

## 2023-05-01 RX ORDER — DULAGLUTIDE 4.5 MG/.5ML
4.5 INJECTION, SOLUTION SUBCUTANEOUS
Qty: 4 EACH | Refills: 1 | Status: SHIPPED | OUTPATIENT
Start: 2023-05-01

## 2023-05-01 NOTE — TELEPHONE ENCOUNTER
Pt is requesting refill of Trulicity 4.5 mg to be sent to pharmacy , as well as a hard copy of a prescription that she can pick asap.      Thank you

## 2023-06-21 ENCOUNTER — OFFICE VISIT (OUTPATIENT)
Age: 68
End: 2023-06-21
Payer: MEDICARE

## 2023-06-21 VITALS
BODY MASS INDEX: 29.7 KG/M2 | WEIGHT: 196 LBS | TEMPERATURE: 97.9 F | HEART RATE: 70 BPM | HEIGHT: 68 IN | SYSTOLIC BLOOD PRESSURE: 116 MMHG | DIASTOLIC BLOOD PRESSURE: 74 MMHG | OXYGEN SATURATION: 96 %

## 2023-06-21 DIAGNOSIS — N12 PYELONEPHRITIS: Primary | ICD-10-CM

## 2023-06-21 LAB
BILIRUBIN, URINE, POC: NEGATIVE
BLOOD URINE, POC: NEGATIVE
GLUCOSE URINE, POC: NEGATIVE
KETONES, URINE, POC: NEGATIVE
LEUKOCYTE ESTERASE, URINE, POC: NEGATIVE
NITRITE, URINE, POC: NEGATIVE
PH, URINE, POC: 7.5 (ref 4.6–8)
PROTEIN,URINE, POC: NORMAL
SPECIFIC GRAVITY, URINE, POC: 1.01 (ref 1–1.03)
URINALYSIS CLARITY, POC: CLEAR
URINALYSIS COLOR, POC: YELLOW
UROBILINOGEN, POC: NORMAL

## 2023-06-21 PROCEDURE — 1036F TOBACCO NON-USER: CPT | Performed by: FAMILY MEDICINE

## 2023-06-21 PROCEDURE — 81003 URINALYSIS AUTO W/O SCOPE: CPT | Performed by: STUDENT IN AN ORGANIZED HEALTH CARE EDUCATION/TRAINING PROGRAM

## 2023-06-21 PROCEDURE — 3074F SYST BP LT 130 MM HG: CPT | Performed by: FAMILY MEDICINE

## 2023-06-21 PROCEDURE — 99214 OFFICE O/P EST MOD 30 MIN: CPT | Performed by: STUDENT IN AN ORGANIZED HEALTH CARE EDUCATION/TRAINING PROGRAM

## 2023-06-21 PROCEDURE — 1090F PRES/ABSN URINE INCON ASSESS: CPT | Performed by: FAMILY MEDICINE

## 2023-06-21 PROCEDURE — G8419 CALC BMI OUT NRM PARAM NOF/U: HCPCS | Performed by: FAMILY MEDICINE

## 2023-06-21 PROCEDURE — PBSHW AMB POC URINALYSIS DIP STICK AUTO W/O MICRO: Performed by: FAMILY MEDICINE

## 2023-06-21 PROCEDURE — 1123F ACP DISCUSS/DSCN MKR DOCD: CPT | Performed by: FAMILY MEDICINE

## 2023-06-21 PROCEDURE — 3078F DIAST BP <80 MM HG: CPT | Performed by: FAMILY MEDICINE

## 2023-06-21 PROCEDURE — G8399 PT W/DXA RESULTS DOCUMENT: HCPCS | Performed by: FAMILY MEDICINE

## 2023-06-21 PROCEDURE — 3017F COLORECTAL CA SCREEN DOC REV: CPT | Performed by: FAMILY MEDICINE

## 2023-06-21 PROCEDURE — G8427 DOCREV CUR MEDS BY ELIG CLIN: HCPCS | Performed by: FAMILY MEDICINE

## 2023-06-21 RX ORDER — CIPROFLOXACIN 500 MG/1
500 TABLET, FILM COATED ORAL 2 TIMES DAILY
Qty: 14 TABLET | Refills: 0 | Status: SHIPPED | OUTPATIENT
Start: 2023-06-21 | End: 2023-06-28

## 2023-06-21 RX ORDER — CEPHALEXIN 500 MG/1
500 CAPSULE ORAL 2 TIMES DAILY
COMMUNITY
Start: 2023-06-17 | End: 2023-06-21

## 2023-06-21 RX ORDER — HYDROCODONE BITARTRATE AND ACETAMINOPHEN 5; 325 MG/1; MG/1
1 TABLET ORAL EVERY 6 HOURS PRN
COMMUNITY
Start: 2023-06-17 | End: 2023-06-21

## 2023-06-21 SDOH — ECONOMIC STABILITY: FOOD INSECURITY: WITHIN THE PAST 12 MONTHS, YOU WORRIED THAT YOUR FOOD WOULD RUN OUT BEFORE YOU GOT MONEY TO BUY MORE.: NEVER TRUE

## 2023-06-21 SDOH — ECONOMIC STABILITY: FOOD INSECURITY: WITHIN THE PAST 12 MONTHS, THE FOOD YOU BOUGHT JUST DIDN'T LAST AND YOU DIDN'T HAVE MONEY TO GET MORE.: NEVER TRUE

## 2023-06-21 SDOH — ECONOMIC STABILITY: HOUSING INSECURITY
IN THE LAST 12 MONTHS, WAS THERE A TIME WHEN YOU DID NOT HAVE A STEADY PLACE TO SLEEP OR SLEPT IN A SHELTER (INCLUDING NOW)?: NO

## 2023-06-21 SDOH — ECONOMIC STABILITY: INCOME INSECURITY: HOW HARD IS IT FOR YOU TO PAY FOR THE VERY BASICS LIKE FOOD, HOUSING, MEDICAL CARE, AND HEATING?: NOT VERY HARD

## 2023-06-21 ASSESSMENT — PATIENT HEALTH QUESTIONNAIRE - PHQ9
SUM OF ALL RESPONSES TO PHQ QUESTIONS 1-9: 0
1. LITTLE INTEREST OR PLEASURE IN DOING THINGS: 0
SUM OF ALL RESPONSES TO PHQ QUESTIONS 1-9: 0
2. FEELING DOWN, DEPRESSED OR HOPELESS: 0
SUM OF ALL RESPONSES TO PHQ9 QUESTIONS 1 & 2: 0
SUM OF ALL RESPONSES TO PHQ QUESTIONS 1-9: 0
SUM OF ALL RESPONSES TO PHQ QUESTIONS 1-9: 0

## 2023-06-21 NOTE — PROGRESS NOTES
Recurring symptoms even after antibiotic. Taking Timolol meleate eye drops. Pt stopped taking pain med yesterday. Pt takes Latanoprost eye drops. Patient states she's having nauseous since yesterday evening,pain lower back an right side started today. An feeling gassy  Patient has been identified by name and . Chief Complaint   Patient presents with    Follow-up       Vitals:    23 1314   BP: 116/74   Pulse: 70   Temp: 97.9 °F (36.6 °C)   SpO2: 96%   Height: 5' 8\" (1.727 m)        1. Have you been to the ER, urgent care clinic since your last visit? Hospitalized since your last visit? Yes. [phone] for back pain    2. Have you seen or consulted any other health care providers outside of the [address] [place] [address] since your last visit? Include any pap smears or colon screening.  no
recent labs from [facility] ER (in media)- UA with + nitrites and + bacteria, no blood, kidney function was normal, CT abd unremarkable for acute pathology    Assessment and orders:      Diagnosis Orders   1. Pyelonephritis  AMB POC URINALYSIS DIP STICK MANUAL W/O MICRO    Culture, Urine    ciprofloxacin (CIPRO) 500 MG tablet    AMB POC URINALYSIS DIP STICK AUTO W/O MICRO        Pyelonephritis: still symptomatic with slight worsening of symptoms today, [facility] to get ur culture results but there UA did not reflex to a culture  - Stop keflex and start cipro 500mg bid x 7 days  - Offered nausea meds but patient declined   - Urine culture today      Follow up in 1 week if no improvement     Pt was discussed with Dr Kem Logan (attending physician). I have reviewed patient medical and social history and medications. I have reviewed pertinent labs results and other data. I have discussed the diagnosis with the patient and the intended plan as seen in the above orders. The patient has received an after-visit summary and questions were answered concerning future plans. I have discussed medication side effects and warnings with the patient as well.     Yu Wall DO  Sports Medicine Fellow [facility]  06/21/23

## 2023-06-23 LAB
BACTERIA SPEC CULT: NORMAL
SERVICE CMNT-IMP: NORMAL

## 2023-06-26 RX ORDER — DULAGLUTIDE 4.5 MG/.5ML
INJECTION, SOLUTION SUBCUTANEOUS
Qty: 2 ML | Refills: 3 | Status: SHIPPED | OUTPATIENT
Start: 2023-06-26

## 2023-07-24 ENCOUNTER — OFFICE VISIT (OUTPATIENT)
Age: 68
End: 2023-07-24
Payer: MEDICARE

## 2023-07-24 VITALS
BODY MASS INDEX: 30.28 KG/M2 | HEART RATE: 71 BPM | OXYGEN SATURATION: 96 % | WEIGHT: 199.8 LBS | RESPIRATION RATE: 18 BRPM | HEIGHT: 68 IN | TEMPERATURE: 98.2 F | SYSTOLIC BLOOD PRESSURE: 105 MMHG | DIASTOLIC BLOOD PRESSURE: 67 MMHG

## 2023-07-24 DIAGNOSIS — F41.1 GENERALIZED ANXIETY DISORDER: ICD-10-CM

## 2023-07-24 DIAGNOSIS — E66.09 OTHER OBESITY DUE TO EXCESS CALORIES: ICD-10-CM

## 2023-07-24 DIAGNOSIS — K21.9 GASTROESOPHAGEAL REFLUX DISEASE WITHOUT ESOPHAGITIS: ICD-10-CM

## 2023-07-24 DIAGNOSIS — R73.09 ELEVATED HEMOGLOBIN A1C: ICD-10-CM

## 2023-07-24 DIAGNOSIS — I10 PRIMARY HYPERTENSION: Primary | ICD-10-CM

## 2023-07-24 DIAGNOSIS — T88.7XXA SIDE EFFECT OF MEDICATION: ICD-10-CM

## 2023-07-24 DIAGNOSIS — F33.1 MAJOR DEPRESSIVE DISORDER, RECURRENT, MODERATE (HCC): ICD-10-CM

## 2023-07-24 PROBLEM — E34.8 PINEAL GLAND CYST: Status: ACTIVE | Noted: 2021-12-07

## 2023-07-24 PROCEDURE — 3017F COLORECTAL CA SCREEN DOC REV: CPT | Performed by: FAMILY MEDICINE

## 2023-07-24 PROCEDURE — 1090F PRES/ABSN URINE INCON ASSESS: CPT | Performed by: FAMILY MEDICINE

## 2023-07-24 PROCEDURE — 1123F ACP DISCUSS/DSCN MKR DOCD: CPT | Performed by: FAMILY MEDICINE

## 2023-07-24 PROCEDURE — G8399 PT W/DXA RESULTS DOCUMENT: HCPCS | Performed by: FAMILY MEDICINE

## 2023-07-24 PROCEDURE — 1036F TOBACCO NON-USER: CPT | Performed by: FAMILY MEDICINE

## 2023-07-24 PROCEDURE — G8417 CALC BMI ABV UP PARAM F/U: HCPCS | Performed by: FAMILY MEDICINE

## 2023-07-24 PROCEDURE — 99214 OFFICE O/P EST MOD 30 MIN: CPT | Performed by: FAMILY MEDICINE

## 2023-07-24 PROCEDURE — 3078F DIAST BP <80 MM HG: CPT | Performed by: FAMILY MEDICINE

## 2023-07-24 PROCEDURE — G8427 DOCREV CUR MEDS BY ELIG CLIN: HCPCS | Performed by: FAMILY MEDICINE

## 2023-07-24 PROCEDURE — 3074F SYST BP LT 130 MM HG: CPT | Performed by: FAMILY MEDICINE

## 2023-07-24 RX ORDER — LATANOPROST 50 UG/ML
SOLUTION/ DROPS OPHTHALMIC
COMMUNITY
Start: 2023-05-26

## 2023-07-24 RX ORDER — LORAZEPAM 0.5 MG/1
0.5 TABLET ORAL DAILY PRN
Status: CANCELLED | OUTPATIENT
Start: 2023-07-24

## 2023-07-24 RX ORDER — PANTOPRAZOLE SODIUM 40 MG/1
40 TABLET, DELAYED RELEASE ORAL
Qty: 90 TABLET | Refills: 0 | Status: SHIPPED | OUTPATIENT
Start: 2023-07-24

## 2023-07-24 RX ORDER — TIMOLOL MALEATE 5 MG/ML
SOLUTION OPHTHALMIC
COMMUNITY
Start: 2023-07-11

## 2023-07-24 RX ORDER — VENLAFAXINE HYDROCHLORIDE 150 MG/1
150 CAPSULE, EXTENDED RELEASE ORAL DAILY
Qty: 90 CAPSULE | Refills: 0 | Status: SHIPPED | OUTPATIENT
Start: 2023-07-24

## 2023-07-24 ASSESSMENT — PATIENT HEALTH QUESTIONNAIRE - PHQ9
SUM OF ALL RESPONSES TO PHQ9 QUESTIONS 1 & 2: 2
1. LITTLE INTEREST OR PLEASURE IN DOING THINGS: 1
SUM OF ALL RESPONSES TO PHQ QUESTIONS 1-9: 2
2. FEELING DOWN, DEPRESSED OR HOPELESS: 1
SUM OF ALL RESPONSES TO PHQ QUESTIONS 1-9: 2

## 2023-07-24 NOTE — PROGRESS NOTES
hemoglobin P0A  Basic Metabolic Panel    Hemoglobin A1C    Hemoglobin R6E    Basic Metabolic Panel      5. Gastroesophageal reflux disease without esophagitis  pantoprazole (PROTONIX) 40 MG tablet      6. Generalized anxiety disorder        7. Side effect of medication            Elevated A1c, in DM range  11/1/22 was first abnormal reading  Rpt A1c today  Will consider pre-DM for working dx  Currently on [employer] weekly  Will consider decreasing dose vs changing to a different GLP1ra due to symptoms - worsening GERD, injection site reaction    Obesity, BMI 32 --> 30.9 --> 30.41 --> 30.08 ---  Starting weight: 212 lbs  Current weight: 199 lbs (-13lbs total, + 2 lbs since last visit)  Co-morbidities: elevated A1c, HLD, HTN  Continue GLP1ra as noted above will help with wt loss    HTN, well controlled  Will continue current meds for time being    Depression, currently active  Sister passed away on Hospice April 2023  Grief likely playing a role in worsening symptoms  Discussed resuming Ativan, not recommended as she is not having anxiety/ panic history  Will increase Effexor to 150mg daily as she had a wonderful response when we initiated med  Pt will schedule with a therapist; she has a list  Encouraged to start exercising    GERD, worsening symptoms  Suspect due to increase in Trulicity to 8.6VA weekly  Will trial increase in Pantoprazole to 40mg daily  Consider reducing dose of Trulicity vs alternative GLP1 if no improvement    Follow up: 1 month    Devora Santoyo MD    We discussed the expected course, resolution and complications of the diagnosis(es) in detail. Medication risks, benefits, costs, interactions, and alternatives were discussed as indicated. I advised her to contact the office if her condition worsens, changes or fails to improve as anticipated. She expressed understanding with the diagnosis(es) and plan.

## 2023-07-25 ENCOUNTER — PATIENT MESSAGE (OUTPATIENT)
Age: 68
End: 2023-07-25

## 2023-07-25 LAB
ANION GAP SERPL CALC-SCNC: 6 MMOL/L (ref 5–15)
BUN SERPL-MCNC: 13 MG/DL (ref 6–20)
BUN/CREAT SERPL: 15 (ref 12–20)
CALCIUM SERPL-MCNC: 10.3 MG/DL (ref 8.5–10.1)
CHLORIDE SERPL-SCNC: 101 MMOL/L (ref 97–108)
CO2 SERPL-SCNC: 32 MMOL/L (ref 21–32)
CREAT SERPL-MCNC: 0.86 MG/DL (ref 0.55–1.02)
EST. AVERAGE GLUCOSE BLD GHB EST-MCNC: 105 MG/DL
GLUCOSE SERPL-MCNC: 82 MG/DL (ref 65–100)
HBA1C MFR BLD: 5.3 % (ref 4–5.6)
POTASSIUM SERPL-SCNC: 3.7 MMOL/L (ref 3.5–5.1)
SODIUM SERPL-SCNC: 139 MMOL/L (ref 136–145)

## 2023-07-26 NOTE — TELEPHONE ENCOUNTER
From: Redd Amos  To: Dr. Bud Louise  Sent: 7/25/2023 1:27 PM EDT  Subject: Pantoprazole 40 mg    Hi Dr. Julio César Sutherland. Just checking on the new Rx. I am currently taking Omeprazole 40 mg not 20 mg. Let me know if you want me to switch.

## 2023-07-30 DIAGNOSIS — E78.00 PURE HYPERCHOLESTEROLEMIA, UNSPECIFIED: Primary | ICD-10-CM

## 2023-07-31 RX ORDER — ROSUVASTATIN CALCIUM 5 MG/1
TABLET, COATED ORAL
Qty: 90 TABLET | Refills: 1 | Status: SHIPPED | OUTPATIENT
Start: 2023-07-31

## 2023-07-31 NOTE — TELEPHONE ENCOUNTER
Medication Refill Request    Liza Brush is requesting a refill of the following medication(s):   Requested Prescriptions     Pending Prescriptions Disp Refills    rosuvastatin (CRESTOR) 5 MG tablet [Pharmacy Med Name: ROSUVASTATIN 5MG TABLETS] 90 tablet      Sig: TAKE 1 TABLET BY MOUTH EVERY DAY      Last provider to prescribe medication: Dr. Dayne Marvin  Last Date of Medication Prescribed:  05/12/2022  Last Office Visit: 07/24/2023    Last Labs:  Lab Results   Component Value Date    CHOL 186 11/01/2022    TRIG 83 11/01/2022    HDL 73 11/01/2022    LDLCALC 96.4 11/01/2022    LABVLDL 16.6 11/01/2022    CHOLHDLRATIO 2.5 11/01/2022      Please send refill to:    [facility] #[id number] - [address] [facility] [address] [place], [place] [address] - F [phone]  [address] [place] [address]  Phone: [phone] Fax: [phone]    Sierra Alarcon MD - [address] [facility] [phone]  f [phone]  [address] [place] [address] [place] [address]  Phone: [phone] Fax: [phone]    [facility] - [address] [place] [address] F [phone]  [address] [place] [address] [place] [address]  Phone: [phone] Fax: Laura Vanegas #[id number]- [address] [phone] - f [phone]  [address] [place] [address]  Phone: [phone] Fax: [phone]    Juana Giron [address] [facility] 1192, [address] [facility] 1305 [facility] [phone] Gilberto Rose [phone]  [address] [place] [address]  Phone: [phone] Fax: [phone]    [facility] ([address] [place] [address]) - [address] [phone] - f [phone]  [facility]  [address] [place] [address]  Phone: [phone] Fax: [phone]    [id number], [address] [place] [address] [facility] [phone] - F [phone]  [address] [address] [address] [address]  Phone:

## 2023-10-17 RX ORDER — DULAGLUTIDE 4.5 MG/.5ML
INJECTION, SOLUTION SUBCUTANEOUS
Qty: 2 ML | Refills: 3 | Status: SHIPPED | OUTPATIENT
Start: 2023-10-17

## 2023-10-17 NOTE — TELEPHONE ENCOUNTER
Requested Prescriptions     Pending Prescriptions Disp Refills    TRULICITY 4.5 HG/6.5IX SOPN [Pharmacy Med Name: Jose Bruner 7.3MQ/0.4RK SDP 0.5ML] 2 mL 3     Sig: INJECT 4.5MG UNDER THE SKIN EVERY 7 DAYS     Medication Refill Request    Ebonie Holland is requesting a refill of the following medication(s):   Requested Prescriptions     Pending Prescriptions Disp Refills    TRULICITY 4.5 NU/3.7YX SOPN [Pharmacy Med Name: Jose Bruner 8.0HE/0.4SA SDP 0.5ML] 2 mL 3     Sig: INJECT 4.5MG UNDER THE SKIN EVERY 7 DAYS      Last provider to prescribe medication: Dr. Null Males  Last Date of Medication Prescribed: 06/26/2023  Last Office Visit Date: 07/24/2023 - Dr. Dario Solares:  Hemoglobin A1C   Date Value Ref Range Status   07/24/2023 5.3 4.0 - 5.6 % Final     Comment:     NEW METHOD  PLEASE NOTE NEW REFERENCE RANGE  (NOTE)  HbA1C Interpretive Ranges  <5.7              Normal  5.7 - 6.4         Consider Prediabetes  >6.5              Consider Diabetes        Please send refill to:    [facility] Caldwell #[id number] - [address] - F [phone]  [address] [place] [address]  Phone: [phone] Fax: [phone]    Altaf Arana MD - [address] [phone] - F [phone]  [address] [place] [address]  [address]  [place] [address]  Phone: [phone] Fax: [phone]    [address] Adamaris Spangler MD - [address] [facility]  - F [phone]  [address] [place] [address]  Phone: [phone] Fax: Ebenezer Gabriel #[id number]- [address] [phone] - f [phone]  [address] [place] [address]  Phone: [phone] Fax: [phone]    Yuan Javi [address], 304 [facility] 1305 [facility] [phone] Fredparker Jemal [phone]  [address] [address]  Phone: [phone] Fax: [phone]    [facility]) -

## 2023-10-25 ENCOUNTER — OFFICE VISIT (OUTPATIENT)
Age: 68
End: 2023-10-25
Payer: MEDICARE

## 2023-10-25 VITALS
HEIGHT: 68 IN | DIASTOLIC BLOOD PRESSURE: 73 MMHG | RESPIRATION RATE: 18 BRPM | BODY MASS INDEX: 30.46 KG/M2 | OXYGEN SATURATION: 97 % | HEART RATE: 81 BPM | WEIGHT: 201 LBS | TEMPERATURE: 98.2 F | SYSTOLIC BLOOD PRESSURE: 112 MMHG

## 2023-10-25 DIAGNOSIS — M17.0 BILATERAL PRIMARY OSTEOARTHRITIS OF KNEE: Primary | ICD-10-CM

## 2023-10-25 PROCEDURE — 20611 DRAIN/INJ JOINT/BURSA W/US: CPT | Performed by: STUDENT IN AN ORGANIZED HEALTH CARE EDUCATION/TRAINING PROGRAM

## 2023-10-25 PROCEDURE — 99214 OFFICE O/P EST MOD 30 MIN: CPT | Performed by: STUDENT IN AN ORGANIZED HEALTH CARE EDUCATION/TRAINING PROGRAM

## 2023-10-25 RX ORDER — BETAMETHASONE SODIUM PHOSPHATE AND BETAMETHASONE ACETATE 3; 3 MG/ML; MG/ML
12 INJECTION, SUSPENSION INTRA-ARTICULAR; INTRALESIONAL; INTRAMUSCULAR; SOFT TISSUE ONCE
Status: SHIPPED | OUTPATIENT
Start: 2023-10-25

## 2023-10-25 RX ORDER — BETAMETHASONE SODIUM PHOSPHATE AND BETAMETHASONE ACETATE 3; 3 MG/ML; MG/ML
12 INJECTION, SUSPENSION INTRA-ARTICULAR; INTRALESIONAL; INTRAMUSCULAR; SOFT TISSUE ONCE
Status: COMPLETED | OUTPATIENT
Start: 2023-10-25 | End: 2023-10-25

## 2023-10-25 RX ORDER — IBUPROFEN 800 MG/1
800 TABLET ORAL
Qty: 90 TABLET | Refills: 1 | Status: SHIPPED | OUTPATIENT
Start: 2023-10-25

## 2023-10-25 RX ORDER — LIDOCAINE HYDROCHLORIDE 10 MG/ML
5 INJECTION, SOLUTION INFILTRATION; PERINEURAL ONCE
Status: SHIPPED | OUTPATIENT
Start: 2023-10-25

## 2023-10-25 RX ORDER — LIDOCAINE HYDROCHLORIDE 10 MG/ML
3 INJECTION, SOLUTION INFILTRATION; PERINEURAL ONCE
Status: COMPLETED | OUTPATIENT
Start: 2023-10-25 | End: 2023-10-25

## 2023-10-25 RX ADMIN — LIDOCAINE HYDROCHLORIDE 3 ML: 10 INJECTION, SOLUTION INFILTRATION; PERINEURAL at 18:10

## 2023-10-25 RX ADMIN — BETAMETHASONE SODIUM PHOSPHATE AND BETAMETHASONE ACETATE 12 MG: 3; 3 INJECTION, SUSPENSION INTRA-ARTICULAR; INTRALESIONAL; INTRAMUSCULAR at 18:11

## 2023-10-25 ASSESSMENT — PATIENT HEALTH QUESTIONNAIRE - PHQ9
SUM OF ALL RESPONSES TO PHQ QUESTIONS 1-9: 0
2. FEELING DOWN, DEPRESSED OR HOPELESS: 0
SUM OF ALL RESPONSES TO PHQ9 QUESTIONS 1 & 2: 0
SUM OF ALL RESPONSES TO PHQ QUESTIONS 1-9: 0
1. LITTLE INTEREST OR PLEASURE IN DOING THINGS: 0

## 2023-10-25 NOTE — PROGRESS NOTES
[address] [facility]      Chief Complaint:   Chief Complaint   Patient presents with    Knee Pain     Cortisone knee injection for right knee, last injection was about 5 months ago. On & off left knee swelling- discuss med options. HPI:  Darren Leos is a 80 y.o. female who presents with bilateral knee pain. - Had right CSI in 03/2023, lasted until about last month. - Also reports having pain and swelling in the left knee.        Past Medical History:   Diagnosis Date    Anxiety     Arthritis     Colon polyps     Depression     Diverticular disease     Glaucoma     Heart murmur     Hemorrhoids     Hypercholesterolemia     Hypertension     Neuropathy     Pineal gland cyst     2 cm    Sleep apnea        Current Outpatient Medications:     amLODIPine (NORVASC) 5 MG tablet, TAKE 1 TABLET BY MOUTH DAILY, Disp: 90 tablet, Rfl: 0    TRULICITY 4.5 EM/5.7AS SOPN, INJECT 4.5MG UNDER THE SKIN EVERY 7 DAYS, Disp: 2 mL, Rfl: 3    rosuvastatin (CRESTOR) 5 MG tablet, TAKE 1 TABLET BY MOUTH EVERY DAY, Disp: 90 tablet, Rfl: 1    timolol (TIMOPTIC-XE) 0.5 % ophthalmic gel-forming, INSTILL 1 DROP IN BOTH EYES EVERY DAY IN THE MORNING, Disp: , Rfl:     latanoprost (XALATAN) 0.005 % ophthalmic solution, INSTILL 1 DROP IN BOTH EYES EVERY DAY IN THE EVENING, Disp: , Rfl:     pantoprazole (PROTONIX) 40 MG tablet, Take 1 tablet by mouth every morning (before breakfast), Disp: 90 tablet, Rfl: 0    venlafaxine (EFFEXOR XR) 150 MG extended release capsule, Take 1 capsule by mouth daily, Disp: 90 capsule, Rfl: 0    aspirin 81 MG chewable tablet, Take 1 tablet by mouth daily, Disp: , Rfl:     Cholecalciferol 50 MCG (2000 UT) CAPS, cholecalciferol (vitamin D3) 50 mcg (2,000 unit) capsule  TAKE 1 CAPSULE BY MOUTH EVERY DAY, Disp: , Rfl:     fluticasone (FLONASE) 50 MCG/ACT nasal spray, 2 sprays by Nasal route daily, Disp: , Rfl:     hydroCHLOROthiazide

## 2023-10-26 ENCOUNTER — HOSPITAL ENCOUNTER (OUTPATIENT)
Facility: HOSPITAL | Age: 68
Discharge: HOME | End: 2023-10-26
Attending: INTERNAL MEDICINE
Payer: MEDICARE

## 2023-10-26 VITALS — WEIGHT: 201 LBS | BODY MASS INDEX: 30.56 KG/M2

## 2023-10-26 DIAGNOSIS — R93.89 ABNORMAL CT SCAN: ICD-10-CM

## 2023-10-26 PROCEDURE — A9579 GAD-BASE MR CONTRAST NOS,1ML: HCPCS | Performed by: RADIOLOGY

## 2023-10-26 PROCEDURE — 6360000004 HC RX CONTRAST MEDICATION: Performed by: RADIOLOGY

## 2023-10-26 PROCEDURE — 74183 MRI ABD W/O CNTR FLWD CNTR: CPT

## 2023-10-26 RX ADMIN — GADOTERIDOL 18 ML: 279.3 INJECTION, SOLUTION INTRAVENOUS at 14:53

## 2023-10-30 ENCOUNTER — OFFICE VISIT (OUTPATIENT)
Age: 68
End: 2023-10-30
Payer: MEDICARE

## 2023-10-30 VITALS
SYSTOLIC BLOOD PRESSURE: 115 MMHG | OXYGEN SATURATION: 99 % | RESPIRATION RATE: 18 BRPM | BODY MASS INDEX: 30.22 KG/M2 | HEIGHT: 68 IN | HEART RATE: 69 BPM | WEIGHT: 199.4 LBS | DIASTOLIC BLOOD PRESSURE: 76 MMHG

## 2023-10-30 DIAGNOSIS — I10 PRIMARY HYPERTENSION: Primary | ICD-10-CM

## 2023-10-30 DIAGNOSIS — Z00.00 MEDICARE ANNUAL WELLNESS VISIT, SUBSEQUENT: ICD-10-CM

## 2023-10-30 DIAGNOSIS — F33.1 MAJOR DEPRESSIVE DISORDER, RECURRENT, MODERATE (HCC): ICD-10-CM

## 2023-10-30 DIAGNOSIS — Z71.89 ACP (ADVANCE CARE PLANNING): ICD-10-CM

## 2023-10-30 DIAGNOSIS — T88.7XXA SIDE EFFECT OF MEDICATION: ICD-10-CM

## 2023-10-30 DIAGNOSIS — R73.09 ELEVATED HEMOGLOBIN A1C: ICD-10-CM

## 2023-10-30 DIAGNOSIS — E78.00 PURE HYPERCHOLESTEROLEMIA, UNSPECIFIED: ICD-10-CM

## 2023-10-30 PROCEDURE — G8484 FLU IMMUNIZE NO ADMIN: HCPCS | Performed by: FAMILY MEDICINE

## 2023-10-30 PROCEDURE — G8427 DOCREV CUR MEDS BY ELIG CLIN: HCPCS | Performed by: FAMILY MEDICINE

## 2023-10-30 PROCEDURE — G8417 CALC BMI ABV UP PARAM F/U: HCPCS | Performed by: FAMILY MEDICINE

## 2023-10-30 PROCEDURE — 1123F ACP DISCUSS/DSCN MKR DOCD: CPT | Performed by: FAMILY MEDICINE

## 2023-10-30 PROCEDURE — 3078F DIAST BP <80 MM HG: CPT | Performed by: FAMILY MEDICINE

## 2023-10-30 PROCEDURE — 99214 OFFICE O/P EST MOD 30 MIN: CPT | Performed by: FAMILY MEDICINE

## 2023-10-30 PROCEDURE — G0439 PPPS, SUBSEQ VISIT: HCPCS | Performed by: FAMILY MEDICINE

## 2023-10-30 PROCEDURE — 3017F COLORECTAL CA SCREEN DOC REV: CPT | Performed by: FAMILY MEDICINE

## 2023-10-30 PROCEDURE — G8399 PT W/DXA RESULTS DOCUMENT: HCPCS | Performed by: FAMILY MEDICINE

## 2023-10-30 PROCEDURE — 1090F PRES/ABSN URINE INCON ASSESS: CPT | Performed by: FAMILY MEDICINE

## 2023-10-30 PROCEDURE — 3074F SYST BP LT 130 MM HG: CPT | Performed by: FAMILY MEDICINE

## 2023-10-30 PROCEDURE — 1036F TOBACCO NON-USER: CPT | Performed by: FAMILY MEDICINE

## 2023-10-30 ASSESSMENT — PATIENT HEALTH QUESTIONNAIRE - PHQ9
4. FEELING TIRED OR HAVING LITTLE ENERGY: 0
10. IF YOU CHECKED OFF ANY PROBLEMS, HOW DIFFICULT HAVE THESE PROBLEMS MADE IT FOR YOU TO DO YOUR WORK, TAKE CARE OF THINGS AT HOME, OR GET ALONG WITH OTHER PEOPLE: 0
SUM OF ALL RESPONSES TO PHQ QUESTIONS 1-9: 0
9. THOUGHTS THAT YOU WOULD BE BETTER OFF DEAD, OR OF HURTING YOURSELF: 0
2. FEELING DOWN, DEPRESSED OR HOPELESS: 0
SUM OF ALL RESPONSES TO PHQ9 QUESTIONS 1 & 2: 0
SUM OF ALL RESPONSES TO PHQ QUESTIONS 1-9: 0
6. FEELING BAD ABOUT YOURSELF - OR THAT YOU ARE A FAILURE OR HAVE LET YOURSELF OR YOUR FAMILY DOWN: 0
SUM OF ALL RESPONSES TO PHQ QUESTIONS 1-9: 0
5. POOR APPETITE OR OVEREATING: 0
7. TROUBLE CONCENTRATING ON THINGS, SUCH AS READING THE NEWSPAPER OR WATCHING TELEVISION: 0
3. TROUBLE FALLING OR STAYING ASLEEP: 0
SUM OF ALL RESPONSES TO PHQ QUESTIONS 1-9: 0
1. LITTLE INTEREST OR PLEASURE IN DOING THINGS: 0
8. MOVING OR SPEAKING SO SLOWLY THAT OTHER PEOPLE COULD HAVE NOTICED. OR THE OPPOSITE, BEING SO FIGETY OR RESTLESS THAT YOU HAVE BEEN MOVING AROUND A LOT MORE THAN USUAL: 0

## 2023-10-30 ASSESSMENT — LIFESTYLE VARIABLES
HOW MANY STANDARD DRINKS CONTAINING ALCOHOL DO YOU HAVE ON A TYPICAL DAY: 1 OR 2
HOW OFTEN DO YOU HAVE A DRINK CONTAINING ALCOHOL: 2-3 TIMES A WEEK

## 2023-10-30 NOTE — ACP (ADVANCE CARE PLANNING)
Advance Care Planning     Advance Care Planning (ACP) Physician/NP/PA Conversation    Date of Conversation: 10/30/2023  Conducted with: Patient with Decision Making Capacity    Healthcare Decision Maker:      Primary Decision Maker: Néstor Thakur - [phone]    Secondary Decision Maker: Marielena Joe - Other - [phone]    Click here to complete Healthcare Decision Makers including selection of the Healthcare Decision Maker Relationship (ie \"Primary\")  Today we documented Decision Maker(s). The patient will provide ACP documents. Care Preferences:    Hospitalization: \"If your health worsens and it becomes clear that your chance of recovery is unlikely, what would be your preference regarding hospitalization? \"  The patient would prefer hospitalization. Ventilation: \"If you were unable to breath on your own and your chance of recovery was unlikely, what would be your preference about the use of a ventilator (breathing machine) if it was available to you? \"  The patient would desire the use of a ventilator. Resuscitation: \"In the event your heart stopped as a result of an underlying serious health condition, would you want attempts made to restart your heart, or would you prefer a natural death? \"  Yes, attempt to resuscitate.     treatment goals, ventilation preferences, and resuscitation preferences    Conversation Outcomes / Follow-Up Plan:  ACP in process - completing/providing documents  Reviewed DNR/DNI and patient elects Full Code (Attempt Resuscitation)    Length of Voluntary ACP Conversation in minutes:  <16 minutes (Non-Billable)    Nicholas Campbell MD

## 2023-10-30 NOTE — PROGRESS NOTES
Chief Complaint   Patient presents with    Discuss Medications     Advised per GI Amlodipine may be the cause of constipation     Discuss Results     Discuss MRI/CT Results     Dr. Mahendra Gann, GI  Dr. Jorge Luis Kern, Ophthalmologist    Vitals:    10/30/23 1324   BP: 115/76   Site: Right Upper Arm   Position: Sitting   Cuff Size: Medium Adult   Pulse: 69   Resp: 18   SpO2: 99%   Weight: 90.4 kg (199 lb 6.4 oz)   Height: 1.727 m (5' 8\")     1. Have you been to the ER, urgent care clinic since your last visit? Hospitalized since your last visit? No    2. Have you seen or consulted any other health care providers outside of the [address] [place] [address] since your last visit? Include any pap smears or colon screening.  No    Colonoscopy FIT Test or Referral to GI
Identified pt with two pt identifiers(name and ). Reviewed record in preparation for visit and have obtained necessary documentation. Chief Complaint   Patient presents with    Discuss Medications     Advised per GI Amlodipine may be the cause of constipation     Discuss Results     Discuss MRI/CT Results        Health Maintenance Due   Topic    Pneumococcal 65+ years Vaccine (2 - PCV)    Lipids        Vitals:    10/30/23 1324   BP: 115/76   Site: Right Upper Arm   Position: Sitting   Cuff Size: Medium Adult   Pulse: 69   Resp: 18   SpO2: 99%   Weight: 90.4 kg (199 lb 6.4 oz)   Height: 1.727 m (5' 8\")           Coordination of Care Questionnaire:  :   1. Have you been to the ER, urgent care clinic since your last visit? Hospitalized since your last visit? No    2. Have you seen or consulted any other health care providers outside of the [address] [place] [address] since your last visit? Include any pap smears or colon screening. No    This patient is accompanied in the office by her Self. I have received verbal consent from Belen Jain to discuss any/all medical information while they are present in the room.
capsule   TAKE 1 CAPSULE BY MOUTH EVERY DAY      fluticasone (FLONASE) 50 MCG/ACT nasal spray 2 sprays by Nasal route daily      hydroCHLOROthiazide (HYDRODIURIL) 25 MG tablet Take 1 tablet by mouth daily      omeprazole (PRILOSEC) 40 MG delayed release capsule omeprazole 40 mg capsule,delayed release   TAKE 1 CAPSULE BY MOUTH EVERY DAY       Current Facility-Administered Medications on File Prior to Visit   Medication Dose Route Frequency Provider Last Rate Last Admin    betamethasone acetate-betamethasone sodium phosphate (CELESTONE) injection 12 mg  12 mg Intra-artICUlar Once Hedwig Gain D, DO        lidocaine 1 % injection 5 mL  5 mL Other Once Hedwig Gain D, DO           Allergies   Allergen Reactions    Lisinopril Itching     Other reaction(s): RASH, ITCHING  Other reaction(s): RASH, ITCHING    Penicillins Hives, Itching and Swelling     Other reaction(s): RASH  Other reaction(s): RASH    Sulfa Antibiotics Itching     Other reaction(s): RASH, ITCHING         Review of Systems:     Review of Systems   Cardiovascular:  Negative for chest pain, palpitations and leg swelling. Skin:  Positive for rash. Neurological:  Positive for light-headedness. Psychiatric/Behavioral:  Negative for dysphoric mood, self-injury and suicidal ideas. The patient is not nervous/anxious. Objective:     Vitals:    10/30/23 1324   BP: 115/76   Site: Right Upper Arm   Position: Sitting   Cuff Size: Medium Adult   Pulse: 69   Resp: 18   SpO2: 99%   Weight: 90.4 kg (199 lb 6.4 oz)   Height: 1.727 m (5' 8\")         Physical Exam:  General appearance - alert, well appearing, and in no distress  Mental status - alert, oriented to person, place, and time, normal mood, much brighter and positive, affect appropriate to mood. Chest - Clear to auscultation bilaterally  Heart - NRRR. No murmurs, rubs, gallops.   Neurological - alert, oriented, normal speech, no focal findings or movement disorder noted    Recent Labs:  No results

## 2023-10-31 LAB
ALBUMIN SERPL-MCNC: 4.2 G/DL (ref 3.5–5)
ALBUMIN/GLOB SERPL: 1.2 (ref 1.1–2.2)
ALP SERPL-CCNC: 63 U/L (ref 45–117)
ALT SERPL-CCNC: 37 U/L (ref 12–78)
ANION GAP SERPL CALC-SCNC: 1 MMOL/L (ref 5–15)
AST SERPL-CCNC: 15 U/L (ref 15–37)
BILIRUB SERPL-MCNC: 0.3 MG/DL (ref 0.2–1)
BUN SERPL-MCNC: 12 MG/DL (ref 6–20)
BUN/CREAT SERPL: 13 (ref 12–20)
CALCIUM SERPL-MCNC: 10.6 MG/DL (ref 8.5–10.1)
CHLORIDE SERPL-SCNC: 103 MMOL/L (ref 97–108)
CHOLEST SERPL-MCNC: 209 MG/DL
CO2 SERPL-SCNC: 34 MMOL/L (ref 21–32)
CREAT SERPL-MCNC: 0.9 MG/DL (ref 0.55–1.02)
GLOBULIN SER CALC-MCNC: 3.4 G/DL (ref 2–4)
GLUCOSE SERPL-MCNC: 87 MG/DL (ref 65–100)
HDLC SERPL-MCNC: 90 MG/DL
HDLC SERPL: 2.3 (ref 0–5)
LDLC SERPL CALC-MCNC: 86.4 MG/DL (ref 0–100)
POTASSIUM SERPL-SCNC: 4.8 MMOL/L (ref 3.5–5.1)
PROT SERPL-MCNC: 7.6 G/DL (ref 6.4–8.2)
SODIUM SERPL-SCNC: 138 MMOL/L (ref 136–145)
TRIGL SERPL-MCNC: 163 MG/DL
VLDLC SERPL CALC-MCNC: 32.6 MG/DL

## 2023-11-07 ENCOUNTER — TELEPHONE (OUTPATIENT)
Age: 68
End: 2023-11-07

## 2024-01-03 ENCOUNTER — HOSPITAL ENCOUNTER (OUTPATIENT)
Facility: HOSPITAL | Age: 69
Discharge: HOME | End: 2024-01-06
Payer: MEDICARE

## 2024-01-03 VITALS
OXYGEN SATURATION: 98 % | DIASTOLIC BLOOD PRESSURE: 84 MMHG | HEIGHT: 69 IN | SYSTOLIC BLOOD PRESSURE: 138 MMHG | WEIGHT: 199.9 LBS | BODY MASS INDEX: 29.61 KG/M2 | RESPIRATION RATE: 12 BRPM | TEMPERATURE: 96.9 F | HEART RATE: 78 BPM

## 2024-01-03 PROBLEM — M17.11 PRIMARY OSTEOARTHRITIS OF RIGHT KNEE: Status: ACTIVE | Noted: 2024-01-03

## 2024-01-03 LAB
ALBUMIN SERPL-MCNC: 4 G/DL (ref 3.5–5)
ALBUMIN/GLOB SERPL: 1.3 (ref 1.1–2.2)
ALP SERPL-CCNC: 63 U/L (ref 45–117)
ALT SERPL-CCNC: 25 U/L (ref 12–78)
ANION GAP SERPL CALC-SCNC: 4 MMOL/L (ref 5–15)
APPEARANCE UR: CLEAR
AST SERPL-CCNC: 17 U/L (ref 15–37)
BACTERIA URNS QL MICRO: NEGATIVE /HPF
BASOPHILS # BLD: 0.1 K/UL (ref 0–0.1)
BASOPHILS NFR BLD: 1 % (ref 0–1)
BILIRUB SERPL-MCNC: 0.4 MG/DL (ref 0.2–1)
BILIRUB UR QL: NEGATIVE
BUN SERPL-MCNC: 13 MG/DL (ref 6–20)
BUN/CREAT SERPL: 15 (ref 12–20)
CALCIUM SERPL-MCNC: 10.2 MG/DL (ref 8.5–10.1)
CHLORIDE SERPL-SCNC: 104 MMOL/L (ref 97–108)
CO2 SERPL-SCNC: 31 MMOL/L (ref 21–32)
COLOR UR: NORMAL
CREAT SERPL-MCNC: 0.86 MG/DL (ref 0.55–1.02)
CRP SERPL-MCNC: <0.29 MG/DL (ref 0–0.6)
DIFFERENTIAL METHOD BLD: ABNORMAL
EOSINOPHIL # BLD: 0.2 K/UL (ref 0–0.4)
EOSINOPHIL NFR BLD: 4 % (ref 0–7)
EPITH CASTS URNS QL MICRO: NORMAL /LPF
ERYTHROCYTE [DISTWIDTH] IN BLOOD BY AUTOMATED COUNT: 14.2 % (ref 11.5–14.5)
ERYTHROCYTE [SEDIMENTATION RATE] IN BLOOD: 6 MM/HR (ref 0–30)
EST. AVERAGE GLUCOSE BLD GHB EST-MCNC: 114 MG/DL
GLOBULIN SER CALC-MCNC: 3.1 G/DL (ref 2–4)
GLUCOSE SERPL-MCNC: 84 MG/DL (ref 65–100)
GLUCOSE UR STRIP.AUTO-MCNC: NEGATIVE MG/DL
HBA1C MFR BLD: 5.6 % (ref 4–5.6)
HCT VFR BLD AUTO: 39.1 % (ref 35–47)
HGB BLD-MCNC: 12.3 G/DL (ref 11.5–16)
HGB UR QL STRIP: NEGATIVE
HYALINE CASTS URNS QL MICRO: NORMAL /LPF (ref 0–2)
IMM GRANULOCYTES # BLD AUTO: 0 K/UL (ref 0–0.04)
IMM GRANULOCYTES NFR BLD AUTO: 0 % (ref 0–0.5)
KETONES UR QL STRIP.AUTO: NEGATIVE MG/DL
LEUKOCYTE ESTERASE UR QL STRIP.AUTO: NEGATIVE
LYMPHOCYTES # BLD: 1.8 K/UL (ref 0.8–3.5)
LYMPHOCYTES NFR BLD: 32 % (ref 12–49)
MCH RBC QN AUTO: 25.6 PG (ref 26–34)
MCHC RBC AUTO-ENTMCNC: 31.5 G/DL (ref 30–36.5)
MCV RBC AUTO: 81.3 FL (ref 80–99)
MONOCYTES # BLD: 0.4 K/UL (ref 0–1)
MONOCYTES NFR BLD: 7 % (ref 5–13)
NEUTS SEG # BLD: 3 K/UL (ref 1.8–8)
NEUTS SEG NFR BLD: 56 % (ref 32–75)
NITRITE UR QL STRIP.AUTO: NEGATIVE
NRBC # BLD: 0 K/UL (ref 0–0.01)
NRBC BLD-RTO: 0 PER 100 WBC
PH UR STRIP: 5.5 (ref 5–8)
PLATELET # BLD AUTO: 347 K/UL (ref 150–400)
PMV BLD AUTO: 10.3 FL (ref 8.9–12.9)
POTASSIUM SERPL-SCNC: 3.7 MMOL/L (ref 3.5–5.1)
PROT SERPL-MCNC: 7.1 G/DL (ref 6.4–8.2)
PROT UR STRIP-MCNC: NEGATIVE MG/DL
RBC # BLD AUTO: 4.81 M/UL (ref 3.8–5.2)
RBC #/AREA URNS HPF: NORMAL /HPF (ref 0–5)
SODIUM SERPL-SCNC: 139 MMOL/L (ref 136–145)
SP GR UR REFRACTOMETRY: 1.01 (ref 1–1.03)
URINE CULTURE IF INDICATED: NORMAL
UROBILINOGEN UR QL STRIP.AUTO: 0.2 EU/DL (ref 0.2–1)
WBC # BLD AUTO: 5.5 K/UL (ref 3.6–11)
WBC URNS QL MICRO: NORMAL /HPF (ref 0–4)

## 2024-01-03 PROCEDURE — 86140 C-REACTIVE PROTEIN: CPT

## 2024-01-03 PROCEDURE — 85025 COMPLETE CBC W/AUTO DIFF WBC: CPT

## 2024-01-03 PROCEDURE — 84466 ASSAY OF TRANSFERRIN: CPT

## 2024-01-03 PROCEDURE — 36415 COLL VENOUS BLD VENIPUNCTURE: CPT

## 2024-01-03 PROCEDURE — 85652 RBC SED RATE AUTOMATED: CPT

## 2024-01-03 PROCEDURE — 93005 ELECTROCARDIOGRAM TRACING: CPT | Performed by: ORTHOPAEDIC SURGERY

## 2024-01-03 PROCEDURE — 83036 HEMOGLOBIN GLYCOSYLATED A1C: CPT

## 2024-01-03 PROCEDURE — 80053 COMPREHEN METABOLIC PANEL: CPT

## 2024-01-03 PROCEDURE — 81001 URINALYSIS AUTO W/SCOPE: CPT

## 2024-01-03 PROCEDURE — APPNB30 APP NON BILLABLE TIME 0-30 MINS: Performed by: NURSE PRACTITIONER

## 2024-01-03 ASSESSMENT — ENCOUNTER SYMPTOMS
SHORTNESS OF BREATH: 0
BLOOD IN STOOL: 0
COUGH: 0
WHEEZING: 0
NAUSEA: 0
TROUBLE SWALLOWING: 0
VOMITING: 0
SORE THROAT: 0
ABDOMINAL PAIN: 0

## 2024-01-03 NOTE — PERIOP NOTE
medications for pre-operative instructions.   Bathing Clothing  Jewelry  Valuables     If you shower the morning of surgery, please do not apply anything to your skin (lotions, powders, deodorant, or makeup, especially mascara)  Follow Chlorhexidine Care Fusion body wash instructions provided to you during PAT appointment. Begin 3 days prior to surgery.  Do not shave or trim anywhere 24 hours before surgery  Wear your hair loose or down; no pony-tails, buns, or metal hair clips  Wear loose, comfortable, clean clothes  Wear glasses instead of contacts (bring eye glass case if needed)  Remove dentures prior to surgery (bring case if needed)  Leave money, valuables, and jewelry, including body piercings, at home  If you were given an Incentive Spirometer, bring it on day of surgery.  Bring updated med list day of surgery     Going Home - or Spending the Night SAME-DAY SURGERY: You must have a responsible adult drive you home and stay with you 24 hours after surgery  ADMITS: If your doctor is keeping you in the hospital after surgery, leave personal belongings/luggage in your car until you have a hospital room number.    Hospital discharge time is 12 noon  Drivers must be here before 12 noon unless you are told differently   Special Instructions Review Joint Education Surgery guidebook and online class prior to surgery       Follow all instructions so your surgery won’t be cancelled.  Please, be on time.                    If a situation occurs and you are delayed the day of surgery, call [phone] or [phone].    If your physical condition changes (like a fever, cold, flu, etc.) call your surgeon.    Home medication(s) reviewed and verified verbally with list during PAT appointment.    The patient was contacted in person.    The patient verbalizes understanding of all instructions and does not need reinforcement.

## 2024-01-03 NOTE — H&P
(FLONASE) 50 MCG/ACT nasal spray 2 sprays by Nasal route daily    hydroCHLOROthiazide (HYDRODIURIL) 25 MG tablet Take 1 tablet by mouth daily    omeprazole (PRILOSEC) 40 MG delayed release capsule omeprazole 40 mg capsule,delayed release   TAKE 1 CAPSULE BY MOUTH EVERY DAY     No current facility-administered medications for this encounter.     Past Medical History:   Diagnosis Date    Anxiety     Arthritis     Colon polyps     Depression     Diabetes mellitus (HCC)     Diverticular disease     Esophageal stricture     Glaucoma     Heart murmur     mitral valve prolapse    Hemorrhoids     Hypercholesterolemia     Hypertension     Neuropathy     Pineal gland cyst     2 cm    Sleep apnea     not being treated    Tinnitus      Past Surgical History:   Procedure Laterality Date    CARPAL TUNNEL RELEASE Right     CATARACT REMOVAL      COLONOSCOPY      ENDOSCOPY, COLON, DIAGNOSTIC      dilation of strictures    EXCISION/BIOPSY      foot mass    HYSTERECTOMY (CERVIX STATUS UNKNOWN)      W BSO    MOUTH SURGERY      oral surgery -- dental implants    ROTATOR CUFF REPAIR      with biceps tendon repair     Social History     Tobacco Use    Smoking status: Never    Smokeless tobacco: Never   Vaping Use    Vaping Use: Never used   Substance Use Topics    Alcohol use: Yes     Alcohol/week: 10.0 standard drinks of alcohol     Types: 8 Glasses of wine, 2 Drinks containing 0.5 oz of alcohol per week    Drug use: Never     Family History   Problem Relation Age of Onset    Heart Disease Brother     Heart Attack Sister     Cancer Mother     Neuropathy Mother     Glaucoma Mother     Hypertension Mother     Breast Cancer Sister 79    Colon Cancer Sister     Breast Cancer Sister         around 50    Hypertension Sister     Hypertension Father     Neuropathy Father     Liver Disease Father     Cancer Father     Hypertension Brother         Pre-Operative Risk Assessment Using 2014 ACC/AHA Guidelines     Emergent procedure: No  Active

## 2024-01-04 ENCOUNTER — HOSPITAL ENCOUNTER (OUTPATIENT)
Facility: HOSPITAL | Age: 69
Discharge: HOME | End: 2024-01-04
Attending: ORTHOPAEDIC SURGERY
Payer: MEDICARE

## 2024-01-04 DIAGNOSIS — M17.11 PRIMARY LOCALIZED OSTEOARTHRITIS OF RIGHT KNEE: ICD-10-CM

## 2024-01-04 LAB
BACTERIA SPEC CULT: NORMAL
BACTERIA SPEC CULT: NORMAL
EKG ATRIAL RATE: 66 BPM
EKG DIAGNOSIS: NORMAL
EKG P AXIS: 52 DEGREES
EKG P-R INTERVAL: 186 MS
EKG Q-T INTERVAL: 430 MS
EKG QRS DURATION: 82 MS
EKG QTC CALCULATION (BAZETT): 450 MS
EKG R AXIS: 20 DEGREES
EKG T AXIS: 40 DEGREES
EKG VENTRICULAR RATE: 66 BPM
SERVICE CMNT-IMP: NORMAL
TRANSFERRIN SERPL-MCNC: 227 MG/DL (ref 192–364)

## 2024-01-04 PROCEDURE — 73700 CT LOWER EXTREMITY W/O DYE: CPT

## 2024-01-12 ENCOUNTER — TELEPHONE (OUTPATIENT)
Age: 69
End: 2024-01-12

## 2024-01-12 NOTE — TELEPHONE ENCOUNTER
Hi Dr. Coto,    Patient is requesting a refill on her medications:    She stated that this one has been refused a couple times by the office and she is not sure why omeprazole (PRILOSEC) 40 MG     TRULICITY 4.5 MG/0.5ML SOPN      Please send to:    [id number] - [place], [place] - [address] - P [phone] - F [phone] [address] [facility] [place] [address]  Phone: [phone]  Fax: [phone]

## 2024-01-15 ENCOUNTER — ANESTHESIA (OUTPATIENT)
Facility: HOSPITAL | Age: 69
End: 2024-01-15
Payer: MEDICARE

## 2024-01-15 ENCOUNTER — ANESTHESIA EVENT (OUTPATIENT)
Facility: HOSPITAL | Age: 69
End: 2024-01-15
Payer: MEDICARE

## 2024-01-15 ENCOUNTER — APPOINTMENT (OUTPATIENT)
Facility: HOSPITAL | Age: 69
End: 2024-01-15
Attending: ORTHOPAEDIC SURGERY
Payer: MEDICARE

## 2024-01-15 ENCOUNTER — HOSPITAL ENCOUNTER (OUTPATIENT)
Facility: HOSPITAL | Age: 69
Setting detail: OBSERVATION
Discharge: HOME | End: 2024-01-15
Attending: ORTHOPAEDIC SURGERY | Admitting: ORTHOPAEDIC SURGERY
Payer: MEDICARE

## 2024-01-15 VITALS
OXYGEN SATURATION: 97 % | TEMPERATURE: 97.8 F | HEIGHT: 69 IN | DIASTOLIC BLOOD PRESSURE: 79 MMHG | BODY MASS INDEX: 29.42 KG/M2 | RESPIRATION RATE: 12 BRPM | HEART RATE: 65 BPM | WEIGHT: 198.63 LBS | SYSTOLIC BLOOD PRESSURE: 131 MMHG

## 2024-01-15 DIAGNOSIS — M17.11 PRIMARY OSTEOARTHRITIS OF RIGHT KNEE: Primary | ICD-10-CM

## 2024-01-15 LAB
GLUCOSE BLD STRIP.AUTO-MCNC: 108 MG/DL (ref 65–117)
GLUCOSE BLD STRIP.AUTO-MCNC: 92 MG/DL (ref 65–117)
SERVICE CMNT-IMP: NORMAL
SERVICE CMNT-IMP: NORMAL

## 2024-01-15 PROCEDURE — 7100000000 HC PACU RECOVERY - FIRST 15 MIN: Performed by: ORTHOPAEDIC SURGERY

## 2024-01-15 PROCEDURE — G0378 HOSPITAL OBSERVATION PER HR: HCPCS

## 2024-01-15 PROCEDURE — 73560 X-RAY EXAM OF KNEE 1 OR 2: CPT

## 2024-01-15 PROCEDURE — 6370000000 HC RX 637 (ALT 250 FOR IP): Performed by: ORTHOPAEDIC SURGERY

## 2024-01-15 PROCEDURE — 82962 GLUCOSE BLOOD TEST: CPT

## 2024-01-15 PROCEDURE — 7100000010 HC PHASE II RECOVERY - FIRST 15 MIN: Performed by: ORTHOPAEDIC SURGERY

## 2024-01-15 PROCEDURE — 64447 NJX AA&/STRD FEMORAL NRV IMG: CPT | Performed by: STUDENT IN AN ORGANIZED HEALTH CARE EDUCATION/TRAINING PROGRAM

## 2024-01-15 PROCEDURE — 2580000003 HC RX 258: Performed by: ANESTHESIOLOGY

## 2024-01-15 PROCEDURE — 97116 GAIT TRAINING THERAPY: CPT

## 2024-01-15 PROCEDURE — 2500000003 HC RX 250 WO HCPCS: Performed by: NURSE ANESTHETIST, CERTIFIED REGISTERED

## 2024-01-15 PROCEDURE — 6360000002 HC RX W HCPCS: Performed by: ORTHOPAEDIC SURGERY

## 2024-01-15 PROCEDURE — 3600000005 HC SURGERY LEVEL 5 BASE: Performed by: ORTHOPAEDIC SURGERY

## 2024-01-15 PROCEDURE — 6360000002 HC RX W HCPCS: Performed by: STUDENT IN AN ORGANIZED HEALTH CARE EDUCATION/TRAINING PROGRAM

## 2024-01-15 PROCEDURE — 97161 PT EVAL LOW COMPLEX 20 MIN: CPT

## 2024-01-15 PROCEDURE — 6360000002 HC RX W HCPCS: Performed by: NURSE ANESTHETIST, CERTIFIED REGISTERED

## 2024-01-15 PROCEDURE — 6360000002 HC RX W HCPCS: Performed by: ANESTHESIOLOGY

## 2024-01-15 PROCEDURE — 6370000000 HC RX 637 (ALT 250 FOR IP): Performed by: ANESTHESIOLOGY

## 2024-01-15 PROCEDURE — 2580000003 HC RX 258: Performed by: ORTHOPAEDIC SURGERY

## 2024-01-15 PROCEDURE — C1776 JOINT DEVICE (IMPLANTABLE): HCPCS | Performed by: ORTHOPAEDIC SURGERY

## 2024-01-15 PROCEDURE — 3700000000 HC ANESTHESIA ATTENDED CARE: Performed by: ORTHOPAEDIC SURGERY

## 2024-01-15 PROCEDURE — 7100000011 HC PHASE II RECOVERY - ADDTL 15 MIN: Performed by: ORTHOPAEDIC SURGERY

## 2024-01-15 PROCEDURE — 3600000015 HC SURGERY LEVEL 5 ADDTL 15MIN: Performed by: ORTHOPAEDIC SURGERY

## 2024-01-15 PROCEDURE — 2709999900 HC NON-CHARGEABLE SUPPLY: Performed by: ORTHOPAEDIC SURGERY

## 2024-01-15 PROCEDURE — 7100000001 HC PACU RECOVERY - ADDTL 15 MIN: Performed by: ORTHOPAEDIC SURGERY

## 2024-01-15 PROCEDURE — 2720000010 HC SURG SUPPLY STERILE: Performed by: ORTHOPAEDIC SURGERY

## 2024-01-15 PROCEDURE — 3700000001 HC ADD 15 MINUTES (ANESTHESIA): Performed by: ORTHOPAEDIC SURGERY

## 2024-01-15 DEVICE — TIBIAL COMPONENT
Type: IMPLANTABLE DEVICE | Site: KNEE | Status: FUNCTIONAL
Brand: TRIATHLON

## 2024-01-15 DEVICE — CRUCIATE RETAINING FEMORAL
Type: IMPLANTABLE DEVICE | Site: KNEE | Status: FUNCTIONAL
Brand: TRIATHLON

## 2024-01-15 DEVICE — COMPONENT TOT KNEE CAPPED PRIMARY K2STRYKER] STRYKER CORP]: Type: IMPLANTABLE DEVICE | Status: FUNCTIONAL

## 2024-01-15 DEVICE — IMPLANTABLE DEVICE: Type: IMPLANTABLE DEVICE | Site: KNEE | Status: FUNCTIONAL

## 2024-01-15 DEVICE — PATELLA
Type: IMPLANTABLE DEVICE | Site: KNEE | Status: FUNCTIONAL
Brand: TRIATHLON

## 2024-01-15 RX ORDER — DEXAMETHASONE SODIUM PHOSPHATE 4 MG/ML
INJECTION, SOLUTION INTRA-ARTICULAR; INTRALESIONAL; INTRAMUSCULAR; INTRAVENOUS; SOFT TISSUE PRN
Status: DISCONTINUED | OUTPATIENT
Start: 2024-01-15 | End: 2024-01-15 | Stop reason: SDUPTHER

## 2024-01-15 RX ORDER — ONDANSETRON 2 MG/ML
4 INJECTION INTRAMUSCULAR; INTRAVENOUS
Status: COMPLETED | OUTPATIENT
Start: 2024-01-15 | End: 2024-01-15

## 2024-01-15 RX ORDER — ROPIVACAINE HYDROCHLORIDE 2 MG/ML
INJECTION, SOLUTION EPIDURAL; INFILTRATION; PERINEURAL PRN
Status: DISCONTINUED | OUTPATIENT
Start: 2024-01-15 | End: 2024-01-15 | Stop reason: SDUPTHER

## 2024-01-15 RX ORDER — IBUPROFEN 800 MG/1
800 TABLET ORAL EVERY 6 HOURS
Qty: 60 TABLET | Refills: 0 | Status: SHIPPED | OUTPATIENT
Start: 2024-01-15

## 2024-01-15 RX ORDER — TRAMADOL HYDROCHLORIDE 50 MG/1
50 TABLET ORAL EVERY 6 HOURS PRN
Qty: 30 TABLET | Refills: 0 | Status: SHIPPED | OUTPATIENT
Start: 2024-01-15 | End: 2024-01-22

## 2024-01-15 RX ORDER — HYDROCHLOROTHIAZIDE 25 MG/1
25 TABLET ORAL DAILY
Status: CANCELLED | OUTPATIENT
Start: 2024-01-15

## 2024-01-15 RX ORDER — FENTANYL CITRATE 50 UG/ML
25 INJECTION, SOLUTION INTRAMUSCULAR; INTRAVENOUS EVERY 5 MIN PRN
Status: DISCONTINUED | OUTPATIENT
Start: 2024-01-15 | End: 2024-01-15 | Stop reason: HOSPADM

## 2024-01-15 RX ORDER — ROCURONIUM BROMIDE 10 MG/ML
INJECTION, SOLUTION INTRAVENOUS PRN
Status: DISCONTINUED | OUTPATIENT
Start: 2024-01-15 | End: 2024-01-15 | Stop reason: SDUPTHER

## 2024-01-15 RX ORDER — NEOSTIGMINE METHYLSULFATE 1 MG/ML
INJECTION, SOLUTION INTRAVENOUS PRN
Status: DISCONTINUED | OUTPATIENT
Start: 2024-01-15 | End: 2024-01-15 | Stop reason: SDUPTHER

## 2024-01-15 RX ORDER — OXYCODONE HYDROCHLORIDE 5 MG/1
10 TABLET ORAL
Status: DISCONTINUED | OUTPATIENT
Start: 2024-01-15 | End: 2024-01-15 | Stop reason: HOSPADM

## 2024-01-15 RX ORDER — ONDANSETRON 4 MG/1
4 TABLET, ORALLY DISINTEGRATING ORAL EVERY 8 HOURS PRN
Status: CANCELLED | OUTPATIENT
Start: 2024-01-15

## 2024-01-15 RX ORDER — BISACODYL 10 MG
10 SUPPOSITORY, RECTAL RECTAL DAILY PRN
Status: CANCELLED | OUTPATIENT
Start: 2024-01-15

## 2024-01-15 RX ORDER — DIPHENHYDRAMINE HYDROCHLORIDE 50 MG/ML
25 INJECTION INTRAMUSCULAR; INTRAVENOUS EVERY 6 HOURS PRN
Status: CANCELLED | OUTPATIENT
Start: 2024-01-15

## 2024-01-15 RX ORDER — PREGABALIN 75 MG/1
75 CAPSULE ORAL
Status: CANCELLED | OUTPATIENT
Start: 2024-01-15

## 2024-01-15 RX ORDER — POLYETHYLENE GLYCOL 3350 17 G/17G
17 POWDER, FOR SOLUTION ORAL DAILY
Status: CANCELLED | OUTPATIENT
Start: 2024-01-15

## 2024-01-15 RX ORDER — TRANEXAMIC ACID 100 MG/ML
INJECTION, SOLUTION INTRAVENOUS PRN
Status: DISCONTINUED | OUTPATIENT
Start: 2024-01-15 | End: 2024-01-15 | Stop reason: SDUPTHER

## 2024-01-15 RX ORDER — OXYCODONE HYDROCHLORIDE 5 MG/1
10 TABLET ORAL
Status: CANCELLED | OUTPATIENT
Start: 2024-01-15

## 2024-01-15 RX ORDER — ACETAMINOPHEN 325 MG/1
650 TABLET ORAL EVERY 6 HOURS
Status: CANCELLED | OUTPATIENT
Start: 2024-01-15

## 2024-01-15 RX ORDER — FLUTICASONE PROPIONATE 50 MCG
2 SPRAY, SUSPENSION (ML) NASAL DAILY
Status: CANCELLED | OUTPATIENT
Start: 2024-01-15

## 2024-01-15 RX ORDER — LATANOPROST 50 UG/ML
1 SOLUTION/ DROPS OPHTHALMIC NIGHTLY
Status: CANCELLED | OUTPATIENT
Start: 2024-01-15

## 2024-01-15 RX ORDER — HYDROMORPHONE HYDROCHLORIDE 1 MG/ML
1 INJECTION, SOLUTION INTRAMUSCULAR; INTRAVENOUS; SUBCUTANEOUS
Status: CANCELLED | OUTPATIENT
Start: 2024-01-15

## 2024-01-15 RX ORDER — FAMOTIDINE 20 MG/1
20 TABLET, FILM COATED ORAL 2 TIMES DAILY
Status: CANCELLED | OUTPATIENT
Start: 2024-01-15

## 2024-01-15 RX ORDER — PROPOFOL 10 MG/ML
INJECTION, EMULSION INTRAVENOUS PRN
Status: DISCONTINUED | OUTPATIENT
Start: 2024-01-15 | End: 2024-01-15 | Stop reason: SDUPTHER

## 2024-01-15 RX ORDER — ROSUVASTATIN CALCIUM 5 MG/1
5 TABLET, COATED ORAL DAILY
Status: CANCELLED | OUTPATIENT
Start: 2024-01-15

## 2024-01-15 RX ORDER — SODIUM CHLORIDE, SODIUM LACTATE, POTASSIUM CHLORIDE, CALCIUM CHLORIDE 600; 310; 30; 20 MG/100ML; MG/100ML; MG/100ML; MG/100ML
INJECTION, SOLUTION INTRAVENOUS CONTINUOUS
Status: DISCONTINUED | OUTPATIENT
Start: 2024-01-15 | End: 2024-01-15 | Stop reason: HOSPADM

## 2024-01-15 RX ORDER — LIDOCAINE HYDROCHLORIDE 10 MG/ML
1 INJECTION, SOLUTION EPIDURAL; INFILTRATION; INTRACAUDAL; PERINEURAL
Status: DISCONTINUED | OUTPATIENT
Start: 2024-01-15 | End: 2024-01-15 | Stop reason: HOSPADM

## 2024-01-15 RX ORDER — ACETAMINOPHEN 325 MG/1
975 TABLET ORAL ONCE
Status: COMPLETED | OUTPATIENT
Start: 2024-01-15 | End: 2024-01-15

## 2024-01-15 RX ORDER — DEXTROSE MONOHYDRATE 100 MG/ML
INJECTION, SOLUTION INTRAVENOUS CONTINUOUS PRN
Status: CANCELLED | OUTPATIENT
Start: 2024-01-15

## 2024-01-15 RX ORDER — MIDAZOLAM HYDROCHLORIDE 2 MG/2ML
2 INJECTION, SOLUTION INTRAMUSCULAR; INTRAVENOUS
Status: DISCONTINUED | OUTPATIENT
Start: 2024-01-15 | End: 2024-01-15 | Stop reason: HOSPADM

## 2024-01-15 RX ORDER — ONDANSETRON 2 MG/ML
INJECTION INTRAMUSCULAR; INTRAVENOUS PRN
Status: DISCONTINUED | OUTPATIENT
Start: 2024-01-15 | End: 2024-01-15 | Stop reason: SDUPTHER

## 2024-01-15 RX ORDER — PANTOPRAZOLE SODIUM 40 MG/1
40 TABLET, DELAYED RELEASE ORAL
Status: CANCELLED | OUTPATIENT
Start: 2024-01-15

## 2024-01-15 RX ORDER — TIMOLOL MALEATE 5 MG/ML
1 SOLUTION/ DROPS OPHTHALMIC 2 TIMES DAILY
Status: CANCELLED | OUTPATIENT
Start: 2024-01-15

## 2024-01-15 RX ORDER — MIDAZOLAM HYDROCHLORIDE 1 MG/ML
INJECTION INTRAMUSCULAR; INTRAVENOUS PRN
Status: DISCONTINUED | OUTPATIENT
Start: 2024-01-15 | End: 2024-01-15 | Stop reason: SDUPTHER

## 2024-01-15 RX ORDER — FENTANYL CITRATE 50 UG/ML
100 INJECTION, SOLUTION INTRAMUSCULAR; INTRAVENOUS
Status: DISCONTINUED | OUTPATIENT
Start: 2024-01-15 | End: 2024-01-15 | Stop reason: HOSPADM

## 2024-01-15 RX ORDER — ASPIRIN 81 MG/1
81 TABLET ORAL 2 TIMES DAILY
Qty: 60 TABLET | Refills: 3 | Status: SHIPPED | OUTPATIENT
Start: 2024-01-15

## 2024-01-15 RX ORDER — KETOROLAC TROMETHAMINE 15 MG/ML
15 INJECTION, SOLUTION INTRAMUSCULAR; INTRAVENOUS
Status: COMPLETED | OUTPATIENT
Start: 2024-01-15 | End: 2024-01-15

## 2024-01-15 RX ORDER — SUCCINYLCHOLINE/SOD CL,ISO/PF 100 MG/5ML
SYRINGE (ML) INTRAVENOUS PRN
Status: DISCONTINUED | OUTPATIENT
Start: 2024-01-15 | End: 2024-01-15 | Stop reason: SDUPTHER

## 2024-01-15 RX ORDER — PREGABALIN 50 MG/1
50 CAPSULE ORAL
Qty: 60 CAPSULE | Refills: 0 | Status: SHIPPED | OUTPATIENT
Start: 2024-01-15 | End: 2024-03-15

## 2024-01-15 RX ORDER — ASPIRIN 81 MG/1
81 TABLET ORAL 2 TIMES DAILY
Status: CANCELLED | OUTPATIENT
Start: 2024-01-15

## 2024-01-15 RX ORDER — ACETAMINOPHEN 325 MG/1
650 TABLET ORAL EVERY 4 HOURS
Qty: 120 TABLET | Refills: 1 | Status: SHIPPED | OUTPATIENT
Start: 2024-01-15 | End: 2024-02-14

## 2024-01-15 RX ORDER — EPHEDRINE SULFATE/0.9% NACL/PF 50 MG/5 ML
SYRINGE (ML) INTRAVENOUS PRN
Status: DISCONTINUED | OUTPATIENT
Start: 2024-01-15 | End: 2024-01-15 | Stop reason: SDUPTHER

## 2024-01-15 RX ORDER — SODIUM CHLORIDE 0.9 % (FLUSH) 0.9 %
5-40 SYRINGE (ML) INJECTION EVERY 12 HOURS SCHEDULED
Status: CANCELLED | OUTPATIENT
Start: 2024-01-15

## 2024-01-15 RX ORDER — SODIUM CHLORIDE 0.9 % (FLUSH) 0.9 %
5-40 SYRINGE (ML) INJECTION PRN
Status: CANCELLED | OUTPATIENT
Start: 2024-01-15

## 2024-01-15 RX ORDER — ONDANSETRON 4 MG/1
4 TABLET, ORALLY DISINTEGRATING ORAL EVERY 8 HOURS PRN
Qty: 10 TABLET | Refills: 0 | Status: SHIPPED | OUTPATIENT
Start: 2024-01-15

## 2024-01-15 RX ORDER — INSULIN LISPRO 100 [IU]/ML
0-4 INJECTION, SOLUTION INTRAVENOUS; SUBCUTANEOUS NIGHTLY
Status: CANCELLED | OUTPATIENT
Start: 2024-01-15

## 2024-01-15 RX ORDER — CELECOXIB 100 MG/1
100 CAPSULE ORAL 2 TIMES DAILY
Status: CANCELLED | OUTPATIENT
Start: 2024-01-15

## 2024-01-15 RX ORDER — SODIUM CHLORIDE 9 MG/ML
INJECTION, SOLUTION INTRAVENOUS CONTINUOUS
Status: CANCELLED | OUTPATIENT
Start: 2024-01-15

## 2024-01-15 RX ORDER — BISACODYL 5 MG/1
5 TABLET, DELAYED RELEASE ORAL DAILY PRN
Status: CANCELLED | OUTPATIENT
Start: 2024-01-15

## 2024-01-15 RX ORDER — INSULIN LISPRO 100 [IU]/ML
0-8 INJECTION, SOLUTION INTRAVENOUS; SUBCUTANEOUS
Status: CANCELLED | OUTPATIENT
Start: 2024-01-15

## 2024-01-15 RX ORDER — DIPHENHYDRAMINE HYDROCHLORIDE 50 MG/ML
12.5 INJECTION INTRAMUSCULAR; INTRAVENOUS
Status: DISCONTINUED | OUTPATIENT
Start: 2024-01-15 | End: 2024-01-15 | Stop reason: HOSPADM

## 2024-01-15 RX ORDER — LIDOCAINE HYDROCHLORIDE 20 MG/ML
INJECTION, SOLUTION EPIDURAL; INFILTRATION; INTRACAUDAL; PERINEURAL PRN
Status: DISCONTINUED | OUTPATIENT
Start: 2024-01-15 | End: 2024-01-15 | Stop reason: SDUPTHER

## 2024-01-15 RX ORDER — SODIUM CHLORIDE 9 MG/ML
INJECTION, SOLUTION INTRAVENOUS PRN
Status: CANCELLED | OUTPATIENT
Start: 2024-01-15

## 2024-01-15 RX ORDER — PREGABALIN 75 MG/1
75 CAPSULE ORAL ONCE
Status: COMPLETED | OUTPATIENT
Start: 2024-01-15 | End: 2024-01-15

## 2024-01-15 RX ORDER — GLYCOPYRROLATE 0.2 MG/ML
INJECTION INTRAMUSCULAR; INTRAVENOUS PRN
Status: DISCONTINUED | OUTPATIENT
Start: 2024-01-15 | End: 2024-01-15 | Stop reason: SDUPTHER

## 2024-01-15 RX ORDER — FENTANYL CITRATE 50 UG/ML
INJECTION, SOLUTION INTRAMUSCULAR; INTRAVENOUS PRN
Status: DISCONTINUED | OUTPATIENT
Start: 2024-01-15 | End: 2024-01-15 | Stop reason: SDUPTHER

## 2024-01-15 RX ORDER — DIPHENHYDRAMINE HCL 25 MG
25 CAPSULE ORAL EVERY 6 HOURS PRN
Status: CANCELLED | OUTPATIENT
Start: 2024-01-15

## 2024-01-15 RX ORDER — HYDROMORPHONE HYDROCHLORIDE 2 MG/ML
INJECTION, SOLUTION INTRAMUSCULAR; INTRAVENOUS; SUBCUTANEOUS PRN
Status: DISCONTINUED | OUTPATIENT
Start: 2024-01-15 | End: 2024-01-15 | Stop reason: SDUPTHER

## 2024-01-15 RX ORDER — ONDANSETRON 2 MG/ML
4 INJECTION INTRAMUSCULAR; INTRAVENOUS EVERY 6 HOURS PRN
Status: CANCELLED | OUTPATIENT
Start: 2024-01-15

## 2024-01-15 RX ORDER — OXYCODONE HYDROCHLORIDE 5 MG/1
5 TABLET ORAL EVERY 4 HOURS PRN
Qty: 42 TABLET | Refills: 0 | Status: SHIPPED | OUTPATIENT
Start: 2024-01-15 | End: 2024-01-22

## 2024-01-15 RX ORDER — 0.9 % SODIUM CHLORIDE 0.9 %
500 INTRAVENOUS SOLUTION INTRAVENOUS ONCE
Status: CANCELLED | OUTPATIENT
Start: 2024-01-15 | End: 2024-01-15

## 2024-01-15 RX ORDER — OXYCODONE HYDROCHLORIDE 5 MG/1
5 TABLET ORAL
Status: CANCELLED | OUTPATIENT
Start: 2024-01-15

## 2024-01-15 RX ADMIN — ROPIVACAINE HYDROCHLORIDE 20 ML: 2 INJECTION, SOLUTION EPIDURAL; INFILTRATION at 06:47

## 2024-01-15 RX ADMIN — Medication 100 MG: at 07:28

## 2024-01-15 RX ADMIN — ACETAMINOPHEN 975 MG: 325 TABLET ORAL at 06:15

## 2024-01-15 RX ADMIN — ROCURONIUM BROMIDE 10 MG: 10 INJECTION, SOLUTION INTRAVENOUS at 07:28

## 2024-01-15 RX ADMIN — HYDROMORPHONE HYDROCHLORIDE 0.5 MG: 1 INJECTION, SOLUTION INTRAMUSCULAR; INTRAVENOUS; SUBCUTANEOUS at 09:57

## 2024-01-15 RX ADMIN — LIDOCAINE HYDROCHLORIDE 60 MG: 20 INJECTION, SOLUTION EPIDURAL; INFILTRATION; INTRACAUDAL; PERINEURAL at 07:28

## 2024-01-15 RX ADMIN — ROCURONIUM BROMIDE 20 MG: 10 INJECTION, SOLUTION INTRAVENOUS at 07:46

## 2024-01-15 RX ADMIN — WATER 2000 MG: 1 INJECTION INTRAMUSCULAR; INTRAVENOUS; SUBCUTANEOUS at 07:38

## 2024-01-15 RX ADMIN — TRANEXAMIC ACID 1000 MG: 100 INJECTION, SOLUTION INTRAVENOUS at 07:36

## 2024-01-15 RX ADMIN — PREGABALIN 75 MG: 75 CAPSULE ORAL at 06:15

## 2024-01-15 RX ADMIN — KETOROLAC TROMETHAMINE 15 MG: 15 INJECTION, SOLUTION INTRAMUSCULAR; INTRAVENOUS at 09:41

## 2024-01-15 RX ADMIN — HYDROMORPHONE HYDROCHLORIDE 1 MG: 2 INJECTION, SOLUTION INTRAMUSCULAR; INTRAVENOUS; SUBCUTANEOUS at 07:58

## 2024-01-15 RX ADMIN — ONDANSETRON 4 MG: 2 INJECTION INTRAMUSCULAR; INTRAVENOUS at 08:29

## 2024-01-15 RX ADMIN — ONDANSETRON 4 MG: 2 INJECTION INTRAMUSCULAR; INTRAVENOUS at 09:41

## 2024-01-15 RX ADMIN — Medication 3 MG: at 08:29

## 2024-01-15 RX ADMIN — FENTANYL CITRATE 100 MCG: 50 INJECTION, SOLUTION INTRAMUSCULAR; INTRAVENOUS at 07:28

## 2024-01-15 RX ADMIN — TRANEXAMIC ACID 1000 MG: 100 INJECTION, SOLUTION INTRAVENOUS at 08:29

## 2024-01-15 RX ADMIN — PROPOFOL 160 MG: 10 INJECTION, EMULSION INTRAVENOUS at 07:28

## 2024-01-15 RX ADMIN — Medication 20 MG: at 07:40

## 2024-01-15 RX ADMIN — SODIUM CHLORIDE, POTASSIUM CHLORIDE, SODIUM LACTATE AND CALCIUM CHLORIDE: 600; 310; 30; 20 INJECTION, SOLUTION INTRAVENOUS at 06:14

## 2024-01-15 RX ADMIN — FENTANYL CITRATE 100 MCG: 50 INJECTION, SOLUTION INTRAMUSCULAR; INTRAVENOUS at 06:41

## 2024-01-15 RX ADMIN — GLYCOPYRROLATE 0.4 MG: 0.2 INJECTION INTRAMUSCULAR; INTRAVENOUS at 08:29

## 2024-01-15 RX ADMIN — PROPOFOL 40 MG: 10 INJECTION, EMULSION INTRAVENOUS at 07:46

## 2024-01-15 RX ADMIN — MIDAZOLAM HYDROCHLORIDE 2 MG: 1 INJECTION, SOLUTION INTRAMUSCULAR; INTRAVENOUS at 06:41

## 2024-01-15 RX ADMIN — DEXAMETHASONE SODIUM PHOSPHATE 8 MG: 4 INJECTION INTRA-ARTICULAR; INTRALESIONAL; INTRAMUSCULAR; INTRAVENOUS; SOFT TISSUE at 07:42

## 2024-01-15 ASSESSMENT — PAIN DESCRIPTION - FREQUENCY
FREQUENCY: CONTINUOUS
FREQUENCY: CONTINUOUS

## 2024-01-15 ASSESSMENT — PAIN DESCRIPTION - DESCRIPTORS
DESCRIPTORS: ACHING

## 2024-01-15 ASSESSMENT — PAIN DESCRIPTION - PAIN TYPE
TYPE: SURGICAL PAIN
TYPE: SURGICAL PAIN

## 2024-01-15 ASSESSMENT — PAIN - FUNCTIONAL ASSESSMENT: PAIN_FUNCTIONAL_ASSESSMENT: 0-10

## 2024-01-15 ASSESSMENT — PAIN DESCRIPTION - LOCATION
LOCATION: KNEE
LOCATION: KNEE

## 2024-01-15 ASSESSMENT — PAIN DESCRIPTION - ONSET
ONSET: PROGRESSIVE
ONSET: PROGRESSIVE

## 2024-01-15 ASSESSMENT — PAIN SCALES - GENERAL
PAINLEVEL_OUTOF10: 2
PAINLEVEL_OUTOF10: 4
PAINLEVEL_OUTOF10: 2
PAINLEVEL_OUTOF10: 2
PAINLEVEL_OUTOF10: 4

## 2024-01-15 ASSESSMENT — PAIN DESCRIPTION - ORIENTATION
ORIENTATION: RIGHT
ORIENTATION: RIGHT

## 2024-01-15 NOTE — PROGRESS NOTES
PHYSICAL THERAPY EVALUATION/DISCHARGE    Patient: Shae Ling (68 y.o. female)  Date: 1/15/2024  Primary Diagnosis: Primary osteoarthritis of right knee [M17.11]  Procedure(s) (LRB):  RIGHT TOTAL KNEE ARTHROPLASTY- RUPERT (FAST TRAK) (Right) Day of Surgery   Precautions:                      ASSESSMENT AND RECOMMENDATIONS:  Based on the objective data described below, the patient presents with good return of strength and sensation following RTKA POD#0. Pt able to perform full HEP with verbal cueing for proper performance including SLR, SAQ and isometrics. Pt transfers to EOB independently and maintains stable vitals throughout. Stands with SBA, verbal cueing for hand placement and use of RW for support. No evidence of buckling with standing marching. Ambulates with SBA, 140ft responds well to verbal cueing and with increased time able to achieve step through pattern with equal step length. Tolerates gait distance well and able to negotiate 3 steps with L railing and SBA. Returned to supine with all needs in reach and ice applied. Verbally reviewed car transfers, elevation, and icing. Pt is cleared for d/c from a PT standpoint. RW obtained and appropriately sized for home use.       Functional Outcome Measure:  The patient scored 24/24 on the [facility] outcome measure.          Further skilled acute physical therapy is not indicated at this time.       PLAN :  Recommendation for discharge: (in order for the patient to meet his/her long term goals): Outpatient physical therapy strengthening and balance post tka    Other factors to consider for discharge: no additional factors    IF patient discharges home will need the following DME: patient owns DME required for discharge       SUBJECTIVE:   Patient stated “I need to get the L one done soon.”    OBJECTIVE DATA SUMMARY:     Past Medical History:   Diagnosis Date    Adverse effect of anesthesia     awakens anxious and agitated at times per pt    Anxiety     Arthritis

## 2024-01-15 NOTE — ANESTHESIA PRE PROCEDURE
Department of Anesthesiology  Preprocedure Note       Name:  Shae Ling   Age:  68 y.o.  :  1955                                          MRN:  [id number]         Date:  1/15/2024      Surgeon: Surgeon(s):  Sawyer Montelongo MD    Procedure: Procedure(s):  RIGHT TOTAL KNEE ARTHROPLASTY- RUPERT (FAST TRAK)    Medications prior to admission:   Prior to Admission medications    Medication Sig Start Date End Date Taking? Authorizing Provider   Multiple Vitamin (MULTIVITAMIN PO) Take 1 tablet by mouth Daily    Nirmala Real MD   ibuprofen (ADVIL;MOTRIN) 800 MG tablet Take 1 tablet by mouth 3 times daily (with meals) 10/25/23   Madeleine Diaz DO   TRULICITY 4.5 MG/0.5ML SOPN INJECT 4.5MG UNDER THE SKIN EVERY 7 DAYS 10/17/23   La Nena Goldberg MD   rosuvastatin (CRESTOR) 5 MG tablet TAKE 1 TABLET BY MOUTH EVERY DAY 23   Dulce Coto MD   timolol (TIMOPTIC-XE) 0.5 % ophthalmic gel-forming INSTILL 1 DROP IN BOTH EYES EVERY DAY IN THE MORNING 23   Nirmala Real MD   latanoprost (XALATAN) 0.005 % ophthalmic solution INSTILL 1 DROP IN BOTH EYES EVERY DAY IN THE EVENING 23   Nirmala Real MD   venlafaxine (EFFEXOR XR) 150 MG extended release capsule Take 1 capsule by mouth daily 23   Dulce Coto MD   aspirin 81 MG chewable tablet Take 1 tablet by mouth daily    Automatic Reconciliation, Ar   Cholecalciferol 50 MCG (2000 UT) CAPS cholecalciferol (vitamin D3) 50 mcg (2,000 unit) capsule   TAKE 1 CAPSULE BY MOUTH EVERY DAY    Automatic Reconciliation, Ar   fluticasone (FLONASE) 50 MCG/ACT nasal spray 2 sprays by Nasal route daily 3/23/22   Automatic Reconciliation, Ar   hydroCHLOROthiazide (HYDRODIURIL) 25 MG tablet Take 1 tablet by mouth daily 3/31/23   Automatic Reconciliation, Ar   omeprazole (PRILOSEC) 40 MG delayed release capsule omeprazole 40 mg capsule,delayed release   TAKE 1 CAPSULE BY MOUTH EVERY DAY    Automatic Reconciliation, Ar       Current

## 2024-01-15 NOTE — PERIOP NOTE
This RN out to lobby to speak with sister Farida, D/C given to read over at this time.  Dr Montelongo to bedside to see pt.    0955 - Pt reports that pain remains unchanged from previous report.  Medicated as per MAR, pt continues to be awake, talkative, pleasant, VSS, aware of plan of care.  Awaiting Xray, then will re-evaluate pain level and speak with PT.    1105 - PT here, pt is dressed, denies nausea, reports pain is tolerable.  Sister Farida at bedside.    1120 - PT here and working with pt.  Pt tolerating well.  Walker given to pt and sized appropriately for height.  Dr Montelongo to see pt again, will reinforce D/C instructions with sister.

## 2024-01-15 NOTE — OP NOTE
OPERATIVE REPORT     Preoperative Diagnosis: Primary osteoarthritis of right knee [M17.11]  Postoperative Diagnosis: Same    Procedure: Procedure(s):  RIGHT TOTAL KNEE ARTHROPLASTY- RUPERT (FAST TRAK)  Surgeon: Sawyer Montelongo MD  Assistant(s): Don Louis PA-C  Anesthesia: Spinal   Estimated Blood Loss: 300cc  Specimens: None  Complications: None       INDICATIONS:   The patient is a 68 y.o., female who has complained of a long history of knee pain. The patient  has failed conservative treatment and presents for definitive operative care. Informed consent obtained including a discussion of the risks and benefits, which include, but are not limited to, bleeding, infection, neurovascular damage, wound complications, pain and stiffness in the knee, periprosthetic loosening, fracture dislocation and DVT, the patient consented for the procedure.     DESCRIPTION OF PROCEDURE:        The patient was seen in the preoperative holding area. The patient was positively identified. The limb was initialed,  questions were answered. The patient was given Ancef preop for an antibiotic. The patient was subsequently taken to the operating room. The patient underwent spinal anesthesia. The patient was positioned in the supine position. All bony prominences were well padded. The limb was prepped and draped in a sterile fashion. The appropriate pause for safety was performed.          A mid-line incision was created. Utilizing an incision from above the superior pole of the patella distally to the tibial tubercle. The incision was taken down through the skin and subcutaneous tissue until the retinaculum could be identified. This was sharply incised utilizing a medial parapatellar incision. The femoral array was placed at the superior portion of the incision. The patella was everted, a planar resurfacing was performed and the drill guide was placed with the appropriate rotation. The medial-based soft tissue was then retracted as well

## 2024-01-15 NOTE — ANESTHESIA POSTPROCEDURE EVALUATION
Department of Anesthesiology  Postprocedure Note    Patient: Shae Ling  MRN: [id number]  YOB: 1955  Date of evaluation: 1/15/2024    Procedure Summary       Date: 01/15/24 Room / Location: [facility] ASU OR  / [facility] AMBULATORY OR    Anesthesia Start: 0720 Anesthesia Stop: 0913    Procedure: RIGHT TOTAL KNEE ARTHROPLASTY- RUPERT (FAST TRAK) (Right: Knee) Diagnosis:       Primary osteoarthritis of right knee      (Primary osteoarthritis of right knee [M17.11])    Surgeons: Sawyer Montelongo MD Responsible Provider: Azael Beauchamp MD    Anesthesia Type: General, Regional ASA Status: 2            Anesthesia Type: General, Regional    Josesito Phase I: Josesito Score: 10    Josesito Phase II:      Anesthesia Post Evaluation    No notable events documented.

## 2024-01-15 NOTE — ANESTHESIA PROCEDURE NOTES
Peripheral Block    Patient location during procedure: pre-op  Reason for block: procedure for pain, post-op pain management, primary anesthetic and at surgeon's request  Start time: 1/15/2024 6:41 AM  End time: 1/15/2024 6:47 AM  Staffing  Performed: anesthesiologist   Anesthesiologist: Azael Beauchamp MD  Performed by: Azael Beauchamp MD  Authorized by: Azael Beauchamp MD    Preanesthetic Checklist  Completed: patient identified, IV checked, site marked, risks and benefits discussed, surgical/procedural consents, pre-op evaluation, timeout performed, anesthesia consent given, oxygen available and monitors applied/VS acknowledged  Peripheral Block   Patient position: supine  Prep: ChloraPrep  Provider prep: mask and sterile gloves  Patient monitoring: cardiac monitor, continuous pulse ox, continuous capnometry, frequent blood pressure checks, IV access, oxygen and responsive to questions  Block type: iPacks  Laterality: right  Injection technique: single-shot  Guidance: ultrasound guided    Needle   Needle type: Other   Needle gauge: 21 G  Needle localization: ultrasound guidance  Needle length: 10 cmOther needle type: STIMUPLEX  Assessment   Injection assessment: negative aspiration for heme, no paresthesia on injection, local visualized surrounding nerve on ultrasound and no intravascular symptoms  Hemodynamics: stable  Outcomes: patient tolerated procedure well    Additional Notes  Ginette LOWE witnessed timeout and block written on correct side.

## 2024-01-15 NOTE — DISCHARGE INSTRUCTIONS
some degree of swelling following surgery.   Following hip and knee replacement surgery, swelling can be normal below the incision for the first few weeks.  This swelling peaks around 5-7 days after surgery.   It is not unusual to have some bruising about the back of the thigh, calf, ankle, and foot.   What should I do for the swelling?  Keep the limb elevated above the level of your heart - 'Toes above Nose'.  Apply compression socks (knee high for total knees and up to the mid-thigh for total hips).   Use the ice packs that you are discharged home with several times a day for the first several weeks.  How long should I remain on blood thinners following surgery?  30 days  Which blood thinners will I be on? Can I take them with Tylenol?   Aspirin 81 mg twice daily - these should be taken with meals and can be used with Tylenol. In certain instances, you may be sent home on Lovenox for 30 days.   For short periods of time (30 days), aspirin and anti-inflammatories (i.e. Aleve, Motrin / Advil / ibuprofen, diclofenac, etc. can be taken together).  When can I drive?  Once you have stopped using regular narcotic pain medications (Oxycodone, Percocet, Lortab, Norco etc.) and can safely apply the brakes without hesitation, (emergency braking).   When can I shower?  You may shower immediately if your Optifoam bandage is dry and without discharge.  The Optifoam dressing should be removed 7 days following surgery, after which you may continue to shower.  No submersion of the incision, bathing or swimming for 14 days following surgery or until cleared by Dr Montelongo.  Can I remove this dressing?  Yes, this is removed just like removing a band aid.  If you are concerned, this can be removed by your therapist.   What do I do with the dressing when I shower?  The Optifoam dressing is waterproof and you may shower with it.   The incision is sealed with Dermabond, a biologic skin glue, which also serves as a watertight seal. If your

## 2024-01-16 ENCOUNTER — TELEPHONE (OUTPATIENT)
Age: 69
End: 2024-01-16

## 2024-01-16 NOTE — TELEPHONE ENCOUNTER
Care Transitions Initial Follow Up Call    Outreach made within 2 business days of discharge: Yes    Patient: Shae Ling Patient : 1955   MRN: [id number]  Reason for Admission: There are no discharge diagnoses documented for the most recent discharge.  Discharge Date: 1/15/24       Spoke with: Left Message    [place] department[place] [facility] [place] [facility]    TCM Interactive Patient Contact:  Unable to contact patient. Left message for patient to return call in regards to recent hospital discharge.     Scheduled appointment with PCP within 7-14 days    Follow Up  Future Appointments   Date Time Provider Department Center   2024 11:00 AM Dulce Coto MD [facility]       Juanjose Hernadez LPN

## 2024-01-20 ENCOUNTER — TELEPHONE (OUTPATIENT)
Age: 69
End: 2024-01-20

## 2024-01-20 NOTE — TELEPHONE ENCOUNTER
Received after-hours clinic call from patient regarding her Trulicity prescription:  - has been out of meds for 1 week  - worried her sugars are high   - does not have glucometer, as she is not on insulin and does not check sugars regularly   - denies polyuria, polydipsia, nausea, vomiting  - c/o constipation    Informed patient that per chart review, prior auth has been initiated for her Trulicity.   Advised going to Urgent Care if she were to develop hyperglycemic symps.   Advised increasing Miralax to BID.     Jana Anderson MD

## 2024-01-22 RX ORDER — OMEPRAZOLE 40 MG/1
CAPSULE, DELAYED RELEASE ORAL
Qty: 90 CAPSULE | OUTPATIENT
Start: 2024-01-22

## 2024-02-08 ENCOUNTER — OFFICE VISIT (OUTPATIENT)
Age: 69
End: 2024-02-08
Payer: MEDICARE

## 2024-02-08 VITALS
HEART RATE: 69 BPM | OXYGEN SATURATION: 98 % | HEIGHT: 69 IN | DIASTOLIC BLOOD PRESSURE: 78 MMHG | BODY MASS INDEX: 29.15 KG/M2 | WEIGHT: 196.8 LBS | RESPIRATION RATE: 18 BRPM | SYSTOLIC BLOOD PRESSURE: 131 MMHG

## 2024-02-08 DIAGNOSIS — I10 PRIMARY HYPERTENSION: ICD-10-CM

## 2024-02-08 DIAGNOSIS — F13.24: ICD-10-CM

## 2024-02-08 DIAGNOSIS — F33.1 MAJOR DEPRESSIVE DISORDER, RECURRENT, MODERATE (HCC): Primary | ICD-10-CM

## 2024-02-08 DIAGNOSIS — R73.09 ELEVATED HEMOGLOBIN A1C: ICD-10-CM

## 2024-02-08 PROBLEM — R82.90 ABNORMAL URINALYSIS: Status: ACTIVE | Noted: 2023-06-17

## 2024-02-08 PROBLEM — M77.30 HEEL SPUR: Status: ACTIVE | Noted: 2023-12-02

## 2024-02-08 PROBLEM — S93.609A FOOT SPRAIN: Status: ACTIVE | Noted: 2023-12-02

## 2024-02-08 PROBLEM — S93.409A ANKLE SPRAIN: Status: ACTIVE | Noted: 2023-12-02

## 2024-02-08 PROBLEM — S30.851A: Status: ACTIVE | Noted: 2023-02-16

## 2024-02-08 PROBLEM — R10.9 ABDOMINAL PAIN: Status: ACTIVE | Noted: 2023-06-17

## 2024-02-08 PROBLEM — M25.473 ANKLE EFFUSION: Status: ACTIVE | Noted: 2023-12-02

## 2024-02-08 PROCEDURE — 3078F DIAST BP <80 MM HG: CPT | Performed by: FAMILY MEDICINE

## 2024-02-08 PROCEDURE — 3017F COLORECTAL CA SCREEN DOC REV: CPT | Performed by: FAMILY MEDICINE

## 2024-02-08 PROCEDURE — 1036F TOBACCO NON-USER: CPT | Performed by: FAMILY MEDICINE

## 2024-02-08 PROCEDURE — G8417 CALC BMI ABV UP PARAM F/U: HCPCS | Performed by: FAMILY MEDICINE

## 2024-02-08 PROCEDURE — 1090F PRES/ABSN URINE INCON ASSESS: CPT | Performed by: FAMILY MEDICINE

## 2024-02-08 PROCEDURE — G8399 PT W/DXA RESULTS DOCUMENT: HCPCS | Performed by: FAMILY MEDICINE

## 2024-02-08 PROCEDURE — 3075F SYST BP GE 130 - 139MM HG: CPT | Performed by: FAMILY MEDICINE

## 2024-02-08 PROCEDURE — 1123F ACP DISCUSS/DSCN MKR DOCD: CPT | Performed by: FAMILY MEDICINE

## 2024-02-08 PROCEDURE — 99213 OFFICE O/P EST LOW 20 MIN: CPT | Performed by: FAMILY MEDICINE

## 2024-02-08 PROCEDURE — G8427 DOCREV CUR MEDS BY ELIG CLIN: HCPCS | Performed by: FAMILY MEDICINE

## 2024-02-08 PROCEDURE — G8484 FLU IMMUNIZE NO ADMIN: HCPCS | Performed by: FAMILY MEDICINE

## 2024-02-08 RX ORDER — TRAMADOL HYDROCHLORIDE 50 MG/1
50 TABLET ORAL EVERY 6 HOURS PRN
COMMUNITY
Start: 2024-01-15

## 2024-02-08 NOTE — PROGRESS NOTES
History of Present Illness:     Chief Complaint   Patient presents with    Depression    Follow-up    Diabetes     01/13/2024-Last Dosage of Trulicity   -States she was advised prescription was written for weight loss instead of diabetes       Shae Ling is a 68 y.o. female     Unable to get the Trulicity  Still able to maintain her weight loss so far    Mentally doing well overall  Under a lot of stress trying to get custody of her grandson  Feels she doing well on the Effexor    PMH (REVIEWED):  Past Medical History:   Diagnosis Date    Adverse effect of anesthesia     awakens anxious and agitated at times per pt    Anxiety     Arthritis     Colon polyps     Depression     Diabetes mellitus (HCC)     Diverticular disease     Esophageal stricture     Glaucoma     Heart murmur     mitral valve prolapse    Hemorrhoids     Hypercholesterolemia     Hypertension     Neuropathy     Pineal gland cyst     2 cm    Sleep apnea     not being treated    Tinnitus        Current Medications/Allergies (REVIEWED):     Current Outpatient Medications on File Prior to Visit   Medication Sig Dispense Refill    traMADol (ULTRAM) 50 MG tablet Take 1 tablet by mouth every 6 hours as needed for Pain. Max Daily Amount: 200 mg      ibuprofen (ADVIL;MOTRIN) 800 MG tablet Take 1 tablet by mouth every 6 hours 60 tablet 0    acetaminophen (TYLENOL) 325 MG tablet Take 2 tablets by mouth every 4 hours 120 tablet 1    aspirin (ASPIRIN 81) 81 MG EC tablet Take 1 tablet by mouth 2 times daily 60 tablet 3    omeprazole (PRILOSEC) 40 MG delayed release capsule omeprazole 40 mg capsule,delayed release   TAKE 1 CAPSULE BY MOUTH EVERY DAY 30 capsule 3    Multiple Vitamin (MULTIVITAMIN PO) Take 1 tablet by mouth Daily      rosuvastatin (CRESTOR) 5 MG tablet TAKE 1 TABLET BY MOUTH EVERY DAY 90 tablet 1    timolol (TIMOPTIC-XE) 0.5 % ophthalmic gel-forming INSTILL 1 DROP IN BOTH EYES EVERY DAY IN THE MORNING      latanoprost (XALATAN) 0.005 % ophthalmic

## 2024-02-08 NOTE — PROGRESS NOTES
Room: 7    Identified pt with two pt identifiers(name and ). Reviewed record in preparation for visit and have obtained necessary documentation.    Chief Complaint   Patient presents with    Depression    Follow-up    Diabetes     2024-Last Dosage of Trulicity   -States she was advised prescription was written for weight loss instead of diabetes        Health Maintenance Due   Topic    Pneumococcal 65+ years Vaccine (2 - PCV)    Breast cancer screen        Vitals:    24 1123   BP: 131/78   Site: Left Upper Arm   Position: Sitting   Cuff Size: Medium Adult   Pulse: 69   Resp: 18   SpO2: 98%   Weight: 89.3 kg (196 lb 12.8 oz)   Height: 1.753 m (5' 9\")           Coordination of Care Questionnaire:  :   1. Have you been to the ER, urgent care clinic since your last visit?  Hospitalized since your last visit?Yes January for knee replacement    2. Have you seen or consulted any other health care providers outside of the [facility] since your last visit?  Include any pap smears or colon screening. No    This patient is accompanied in the office by her self.  I have received verbal consent from Shae Ling to discuss any/all medical information while they are present in the room.

## 2024-02-12 DIAGNOSIS — E78.00 PURE HYPERCHOLESTEROLEMIA, UNSPECIFIED: ICD-10-CM

## 2024-02-14 DIAGNOSIS — F33.1 MAJOR DEPRESSIVE DISORDER, RECURRENT, MODERATE (HCC): ICD-10-CM

## 2024-02-14 RX ORDER — ROSUVASTATIN CALCIUM 5 MG/1
TABLET, COATED ORAL
Qty: 90 TABLET | Refills: 1 | OUTPATIENT
Start: 2024-02-14

## 2024-02-14 NOTE — TELEPHONE ENCOUNTER
[address] - P [phone] - F [phone]  [address] [facility] [address]  Phone: [phone] Fax: [phone]

## 2024-02-14 NOTE — TELEPHONE ENCOUNTER
Medication Refill Request    Shae Ling is requesting a refill of the following medication(s):   Requested Prescriptions     Pending Prescriptions Disp Refills    venlafaxine (EFFEXOR XR) 150 MG extended release capsule [Pharmacy Med Name: VENLAFAXINE ER 150MG CAPSULES] 90 capsule 0     Sig: TAKE 1 CAPSULE BY MOUTH DAILY        Listed PCP is Dulce Coto MD   Last provider to prescribe medication: Dr. Coto  Last Date of Medication Prescribed: 07/24/2023   Date of Last Office Visit at [facility]: 02/08/2024   Last Labs:  Future Appointment: No future appointments.    Refill Request Note:N/A    Please send refill to:    [facility] #[id number] [place], [place] - [address] - P [phone] - F [phone] [address] [facility]  [facility] [address]  Phone: [phone] Fax: [phone]    [id number] [place] - [address] - P [phone] - F [phone] [address] [place] [address]  Phone: [phone] Fax: [phone]    [id number] Jason Yee MD - [address] [facility] [phone] - F [phone]  [address] [facility]  [place] [address]  Phone: [phone] Fax: [phone]    [facility]/pharmacy #[id number] - [place], [place] - [address] [phone] - F [phone]  [address] [facility]  [place] [address]  Phone: [phone] Fax: [phone]    [facility] #[id number] - [place] - [address] [phone] - F [phone]  [address] [place] [address]  Phone: [phone] Fax: [phone]    [facility] - [place], [place] - [address] [facility] -  [phone] - F [phone]  [address] [facility]  [address]  [facility] [address]  Phone: [phone] Fax: [phone]    [facility]/pharmacy #[id number] - [place], [place] - [address] [place] [address] RD - P [phone] - F [phone]  [address] [place] [address]  Phone: [phone] Fax: [phone]         Home

## 2024-02-15 RX ORDER — ROSUVASTATIN CALCIUM 5 MG/1
TABLET, COATED ORAL
Qty: 90 TABLET | Refills: 3 | Status: SHIPPED | OUTPATIENT
Start: 2024-02-15

## 2024-02-15 RX ORDER — VENLAFAXINE HYDROCHLORIDE 150 MG/1
150 CAPSULE, EXTENDED RELEASE ORAL DAILY
Qty: 90 CAPSULE | Refills: 1 | Status: SHIPPED | OUTPATIENT
Start: 2024-02-15

## 2024-04-08 ENCOUNTER — OFFICE VISIT (OUTPATIENT)
Age: 69
End: 2024-04-08
Payer: MEDICARE

## 2024-04-08 VITALS
HEIGHT: 66 IN | DIASTOLIC BLOOD PRESSURE: 90 MMHG | WEIGHT: 196 LBS | BODY MASS INDEX: 31.5 KG/M2 | SYSTOLIC BLOOD PRESSURE: 138 MMHG | HEART RATE: 90 BPM | OXYGEN SATURATION: 96 % | RESPIRATION RATE: 18 BRPM

## 2024-04-08 DIAGNOSIS — H61.22 IMPACTED CERUMEN OF LEFT EAR: ICD-10-CM

## 2024-04-08 DIAGNOSIS — J34.2 DEVIATED NASAL SEPTUM: Primary | ICD-10-CM

## 2024-04-08 DIAGNOSIS — H92.01 OTALGIA, RIGHT: ICD-10-CM

## 2024-04-08 DIAGNOSIS — J34.3 HYPERTROPHY OF NASAL TURBINATES: ICD-10-CM

## 2024-04-08 PROCEDURE — 3075F SYST BP GE 130 - 139MM HG: CPT | Performed by: OTOLARYNGOLOGY

## 2024-04-08 PROCEDURE — 69210 REMOVE IMPACTED EAR WAX UNI: CPT | Performed by: OTOLARYNGOLOGY

## 2024-04-08 PROCEDURE — 1090F PRES/ABSN URINE INCON ASSESS: CPT | Performed by: OTOLARYNGOLOGY

## 2024-04-08 PROCEDURE — G8399 PT W/DXA RESULTS DOCUMENT: HCPCS | Performed by: OTOLARYNGOLOGY

## 2024-04-08 PROCEDURE — 99214 OFFICE O/P EST MOD 30 MIN: CPT | Performed by: OTOLARYNGOLOGY

## 2024-04-08 PROCEDURE — G8417 CALC BMI ABV UP PARAM F/U: HCPCS | Performed by: OTOLARYNGOLOGY

## 2024-04-08 PROCEDURE — 1036F TOBACCO NON-USER: CPT | Performed by: OTOLARYNGOLOGY

## 2024-04-08 PROCEDURE — 3080F DIAST BP >= 90 MM HG: CPT | Performed by: OTOLARYNGOLOGY

## 2024-04-08 PROCEDURE — 3017F COLORECTAL CA SCREEN DOC REV: CPT | Performed by: OTOLARYNGOLOGY

## 2024-04-08 PROCEDURE — G8427 DOCREV CUR MEDS BY ELIG CLIN: HCPCS | Performed by: OTOLARYNGOLOGY

## 2024-04-08 PROCEDURE — 1123F ACP DISCUSS/DSCN MKR DOCD: CPT | Performed by: OTOLARYNGOLOGY

## 2024-04-08 ASSESSMENT — ENCOUNTER SYMPTOMS
VOMITING: 0
EYE ITCHING: 0
NAUSEA: 0
ABDOMINAL PAIN: 0
SINUS PRESSURE: 0
APNEA: 0
TROUBLE SWALLOWING: 0
SHORTNESS OF BREATH: 0
SINUS PAIN: 0
VOICE CHANGE: 0
SORE THROAT: 0
STRIDOR: 0
WHEEZING: 0
CHOKING: 0
EYE DISCHARGE: 0
PHOTOPHOBIA: 0
BACK PAIN: 0
COUGH: 0

## 2024-04-08 NOTE — PROGRESS NOTES
nervous/anxious.           Past Medical History:   Diagnosis Date    Adverse effect of anesthesia     awakens anxious and agitated at times per pt    Anxiety     Arthritis     Colon polyps     Depression     Diabetes mellitus (HCC)     Diverticular disease     Esophageal stricture     Glaucoma     Heart murmur     mitral valve prolapse    Hemorrhoids     Hypercholesterolemia     Hypertension     Neuropathy     Pineal gland cyst     2 cm    Sleep apnea     not being treated    Tinnitus      Prior to Admission medications    Medication Sig Start Date End Date Taking? Authorizing Provider   rosuvastatin (CRESTOR) 5 MG tablet TAKE 1 TABLET BY MOUTH EVERY DAY 2/15/24   Dulce Coto MD   venlafaxine (EFFEXOR XR) 150 MG extended release capsule TAKE 1 CAPSULE BY MOUTH DAILY 2/15/24   Dulce Coto MD   traMADol (ULTRAM) 50 MG tablet Take 1 tablet by mouth every 6 hours as needed for Pain. Max Daily Amount: 200 mg 1/15/24   Nirmala Real MD   ibuprofen (ADVIL;MOTRIN) 800 MG tablet Take 1 tablet by mouth every 6 hours 1/15/24   Sawyer Montelongo MD   acetaminophen (TYLENOL) 325 MG tablet Take 2 tablets by mouth every 4 hours 1/15/24 2/14/24  Sawyer Montelongo MD   aspirin (ASPIRIN 81) 81 MG EC tablet Take 1 tablet by mouth 2 times daily 1/15/24   Sawyer Montelongo MD   omeprazole (PRILOSEC) 40 MG delayed release capsule omeprazole 40 mg capsule,delayed release   TAKE 1 CAPSULE BY MOUTH EVERY DAY 1/15/24   Dulce Coto MD   Multiple Vitamin (MULTIVITAMIN PO) Take 1 tablet by mouth Daily    Nirmala Real MD   timolol (TIMOPTIC-XE) 0.5 % ophthalmic gel-forming INSTILL 1 DROP IN BOTH EYES EVERY DAY IN THE MORNING 7/11/23   Nirmala Real MD   latanoprost (XALATAN) 0.005 % ophthalmic solution INSTILL 1 DROP IN BOTH EYES EVERY DAY IN THE EVENING 5/26/23   Nirmala Real MD   Cholecalciferol 50 MCG (2000 UT) CAPS cholecalciferol (vitamin D3) 50 mcg (2,000 unit) capsule   TAKE 1 CAPSULE BY MOUTH

## 2024-04-17 ENCOUNTER — PREP FOR PROCEDURE (OUTPATIENT)
Age: 69
End: 2024-04-17

## 2024-04-17 DIAGNOSIS — H92.01 OTALGIA, RIGHT: ICD-10-CM

## 2024-04-17 DIAGNOSIS — J34.3 HYPERTROPHY OF NASAL TURBINATES: ICD-10-CM

## 2024-04-17 DIAGNOSIS — H61.22 IMPACTED CERUMEN OF LEFT EAR: ICD-10-CM

## 2024-04-17 DIAGNOSIS — J34.2 DEVIATED NASAL SEPTUM: ICD-10-CM

## 2024-04-22 ENCOUNTER — PATIENT MESSAGE (OUTPATIENT)
Age: 69
End: 2024-04-22

## 2024-04-22 RX ORDER — HYDROCHLOROTHIAZIDE 25 MG/1
25 TABLET ORAL DAILY
Qty: 90 TABLET | Refills: 3 | Status: SHIPPED | OUTPATIENT
Start: 2024-04-22

## 2024-04-22 NOTE — TELEPHONE ENCOUNTER
From: Shae Ling  To: Dr. Dulce Coto  Sent: 4/22/2024 12:49 PM EDT  Subject: HCTZ    Good afternoon. I am out of this medication and was unable to refill fill it on My Chart.

## 2024-04-29 ENCOUNTER — CLINICAL DOCUMENTATION (OUTPATIENT)
Age: 69
End: 2024-04-29

## 2024-04-29 NOTE — PROGRESS NOTES
Prior authorization for Trulicity initiated on 04/29/2024 via [email or web address]. Outcome Pending

## 2024-04-30 ENCOUNTER — ANESTHESIA EVENT (OUTPATIENT)
Facility: HOSPITAL | Age: 69
End: 2024-04-30
Payer: MEDICARE

## 2024-04-30 RX ORDER — CLINDAMYCIN HYDROCHLORIDE 300 MG/1
300 CAPSULE ORAL 3 TIMES DAILY
Qty: 40 CAPSULE | Refills: 0 | Status: SHIPPED | OUTPATIENT
Start: 2024-04-30 | End: 2024-05-10

## 2024-04-30 RX ORDER — AMLODIPINE BESYLATE 5 MG/1
5 TABLET ORAL DAILY
COMMUNITY

## 2024-04-30 NOTE — PROGRESS NOTES
Patient last dose of ASA was taken today. Per Dr. Seo patient needs to hold any additional doses of ASA prior to surgery. Patient expressed concerns of needing ABX prior to procedure being that she had a TKR in January. Dr. Seo notified of patient concern, states he will call in ABX for patient to start taking. Patient states understanding to hold ASA, and take ABX as prescribed.

## 2024-04-30 NOTE — PROGRESS NOTES
Reviewed chart with Dr. Colbert to include history of MVP. Dr. Colbert reviewed patients anesthesia even from January surgery in chart, states patient did fine. Okay to proceed with surgery without further testing.

## 2024-05-01 NOTE — ANESTHESIA PRE PROCEDURE
Department of Anesthesiology  Preprocedure Note       Name:  Shae Ling   Age:  69 y.o.  :  1955                                          MRN:  [id number]         Date:  2024      Surgeon: Surgeon(s):  Greg Seo MD    Procedure: Procedure(s):  SEPTOPLASTY WITH COBLATION BILATERAL TURBINATES    Medications prior to admission:   Prior to Admission medications    Medication Sig Start Date End Date Taking? Authorizing Provider   PSYLLIUM FIBER PO Take 1 capsule by mouth daily   Yes Nirmala Real MD   amLODIPine (NORVASC) 5 MG tablet Take 1 tablet by mouth daily   Yes Nirmala Real MD   clindamycin (CLEOCIN) 300 MG capsule Take 1 capsule by mouth 3 times daily for 10 days 4/30/24 5/10/24  Greg Seo MD   hydroCHLOROthiazide (HYDRODIURIL) 25 MG tablet Take 1 tablet by mouth daily 24   Dulce Coto MD   rosuvastatin (CRESTOR) 5 MG tablet TAKE 1 TABLET BY MOUTH EVERY DAY  Patient taking differently: Take 1 tablet by mouth daily 2/15/24   Dulce Coto MD   venlafaxine (EFFEXOR XR) 150 MG extended release capsule TAKE 1 CAPSULE BY MOUTH DAILY 2/15/24   Dulce Coto MD   ibuprofen (ADVIL;MOTRIN) 800 MG tablet Take 1 tablet by mouth every 6 hours 1/15/24   Sawyer Montelongo MD   aspirin (ASPIRIN 81) 81 MG EC tablet Take 1 tablet by mouth 2 times daily  Patient taking differently: Take 1 tablet by mouth daily 1/15/24   Sawyer Montelongo MD   Multiple Vitamin (MULTIVITAMIN PO) Take 1 tablet by mouth Daily    Nirmala Real MD   timolol (TIMOPTIC-XE) 0.5 % ophthalmic gel-forming INSTILL 1 DROP IN BOTH EYES EVERY DAY IN THE MORNING 23   Nirmala Real MD   latanoprost (XALATAN) 0.005 % ophthalmic solution INSTILL 1 DROP IN BOTH EYES EVERY DAY IN THE EVENING 23   Nirmala Real MD   Cholecalciferol 50 MCG (2000) CAPS Take 1 capsule by mouth daily    Automatic Reconciliation, Ar   fluticasone (FLONASE) 50 MCG/ACT nasal spray 2 sprays by Nasal route

## 2024-05-02 ENCOUNTER — ANESTHESIA (OUTPATIENT)
Facility: HOSPITAL | Age: 69
End: 2024-05-02
Payer: MEDICARE

## 2024-05-02 ENCOUNTER — HOSPITAL ENCOUNTER (OUTPATIENT)
Facility: HOSPITAL | Age: 69
Discharge: HOME | End: 2024-05-02
Attending: OTOLARYNGOLOGY | Admitting: OTOLARYNGOLOGY
Payer: MEDICARE

## 2024-05-02 VITALS
RESPIRATION RATE: 20 BRPM | OXYGEN SATURATION: 99 % | SYSTOLIC BLOOD PRESSURE: 118 MMHG | DIASTOLIC BLOOD PRESSURE: 70 MMHG | WEIGHT: 192 LBS | HEIGHT: 69 IN | BODY MASS INDEX: 28.44 KG/M2 | HEART RATE: 85 BPM | TEMPERATURE: 98.3 F

## 2024-05-02 DIAGNOSIS — J34.2 DEVIATED NASAL SEPTUM: Primary | ICD-10-CM

## 2024-05-02 LAB
ANION GAP BLD CALC-SCNC: 8
CA-I BLD-MCNC: 1.26 MMOL/L (ref 1.12–1.32)
CHLORIDE BLD-SCNC: 106 MMOL/L (ref 98–107)
CO2 BLD-SCNC: 27 MMOL/L
CREAT UR-MCNC: 0.59 MG/DL (ref 0.6–1.3)
GLUCOSE BLD STRIP.AUTO-MCNC: 117 MG/DL (ref 65–100)
POTASSIUM BLD-SCNC: 3.5 MMOL/L (ref 3.5–5.5)
SODIUM BLD-SCNC: 140 MMOL/L (ref 136–145)

## 2024-05-02 PROCEDURE — 6360000002 HC RX W HCPCS: Performed by: OTOLARYNGOLOGY

## 2024-05-02 PROCEDURE — 2709999900 HC NON-CHARGEABLE SUPPLY: Performed by: OTOLARYNGOLOGY

## 2024-05-02 PROCEDURE — 6360000002 HC RX W HCPCS: Performed by: NURSE ANESTHETIST, CERTIFIED REGISTERED

## 2024-05-02 PROCEDURE — 3600000002 HC SURGERY LEVEL 2 BASE: Performed by: OTOLARYNGOLOGY

## 2024-05-02 PROCEDURE — 2500000003 HC RX 250 WO HCPCS: Performed by: NURSE ANESTHETIST, CERTIFIED REGISTERED

## 2024-05-02 PROCEDURE — 7100000010 HC PHASE II RECOVERY - FIRST 15 MIN: Performed by: OTOLARYNGOLOGY

## 2024-05-02 PROCEDURE — 3700000000 HC ANESTHESIA ATTENDED CARE: Performed by: OTOLARYNGOLOGY

## 2024-05-02 PROCEDURE — 30520 REPAIR OF NASAL SEPTUM: CPT | Performed by: OTOLARYNGOLOGY

## 2024-05-02 PROCEDURE — 80047 BASIC METABLC PNL IONIZED CA: CPT

## 2024-05-02 PROCEDURE — 7100000000 HC PACU RECOVERY - FIRST 15 MIN: Performed by: OTOLARYNGOLOGY

## 2024-05-02 PROCEDURE — 7100000011 HC PHASE II RECOVERY - ADDTL 15 MIN: Performed by: OTOLARYNGOLOGY

## 2024-05-02 PROCEDURE — 30802 ABLATE INF TURBINATE SUBMUC: CPT | Performed by: OTOLARYNGOLOGY

## 2024-05-02 PROCEDURE — 6370000000 HC RX 637 (ALT 250 FOR IP): Performed by: OTOLARYNGOLOGY

## 2024-05-02 PROCEDURE — 7100000001 HC PACU RECOVERY - ADDTL 15 MIN: Performed by: OTOLARYNGOLOGY

## 2024-05-02 PROCEDURE — 2580000003 HC RX 258: Performed by: OTOLARYNGOLOGY

## 2024-05-02 PROCEDURE — 2500000003 HC RX 250 WO HCPCS: Performed by: OTOLARYNGOLOGY

## 2024-05-02 PROCEDURE — 3700000001 HC ADD 15 MINUTES (ANESTHESIA): Performed by: OTOLARYNGOLOGY

## 2024-05-02 PROCEDURE — 3600000012 HC SURGERY LEVEL 2 ADDTL 15MIN: Performed by: OTOLARYNGOLOGY

## 2024-05-02 RX ORDER — DEXTROSE MONOHYDRATE 100 MG/ML
INJECTION, SOLUTION INTRAVENOUS CONTINUOUS PRN
Status: DISCONTINUED | OUTPATIENT
Start: 2024-05-02 | End: 2024-05-02 | Stop reason: HOSPADM

## 2024-05-02 RX ORDER — METOCLOPRAMIDE HYDROCHLORIDE 5 MG/ML
10 INJECTION INTRAMUSCULAR; INTRAVENOUS
Status: DISCONTINUED | OUTPATIENT
Start: 2024-05-02 | End: 2024-05-02 | Stop reason: HOSPADM

## 2024-05-02 RX ORDER — SODIUM CHLORIDE 9 MG/ML
INJECTION, SOLUTION INTRAVENOUS PRN
Status: DISCONTINUED | OUTPATIENT
Start: 2024-05-02 | End: 2024-05-02 | Stop reason: HOSPADM

## 2024-05-02 RX ORDER — HYDRALAZINE HYDROCHLORIDE 20 MG/ML
10 INJECTION INTRAMUSCULAR; INTRAVENOUS
Status: DISCONTINUED | OUTPATIENT
Start: 2024-05-02 | End: 2024-05-02 | Stop reason: HOSPADM

## 2024-05-02 RX ORDER — MIDAZOLAM HYDROCHLORIDE 1 MG/ML
INJECTION INTRAMUSCULAR; INTRAVENOUS PRN
Status: DISCONTINUED | OUTPATIENT
Start: 2024-05-02 | End: 2024-05-02 | Stop reason: SDUPTHER

## 2024-05-02 RX ORDER — OXYCODONE HYDROCHLORIDE 5 MG/1
10 TABLET ORAL PRN
Status: DISCONTINUED | OUTPATIENT
Start: 2024-05-02 | End: 2024-05-02 | Stop reason: HOSPADM

## 2024-05-02 RX ORDER — ACETAMINOPHEN 325 MG/1
650 TABLET ORAL EVERY 4 HOURS PRN
Status: CANCELLED | OUTPATIENT
Start: 2024-05-02

## 2024-05-02 RX ORDER — ONDANSETRON 4 MG/1
4 TABLET, ORALLY DISINTEGRATING ORAL EVERY 8 HOURS PRN
Status: CANCELLED | OUTPATIENT
Start: 2024-05-02

## 2024-05-02 RX ORDER — LIDOCAINE 4 G/G
1 PATCH TOPICAL AS NEEDED
Status: DISCONTINUED | OUTPATIENT
Start: 2024-05-02 | End: 2024-05-02 | Stop reason: HOSPADM

## 2024-05-02 RX ORDER — PROPOFOL 10 MG/ML
INJECTION, EMULSION INTRAVENOUS PRN
Status: DISCONTINUED | OUTPATIENT
Start: 2024-05-02 | End: 2024-05-02 | Stop reason: SDUPTHER

## 2024-05-02 RX ORDER — SODIUM CHLORIDE 0.9 % (FLUSH) 0.9 %
5-40 SYRINGE (ML) INJECTION EVERY 12 HOURS SCHEDULED
Status: DISCONTINUED | OUTPATIENT
Start: 2024-05-02 | End: 2024-05-02 | Stop reason: HOSPADM

## 2024-05-02 RX ORDER — ONDANSETRON 2 MG/ML
INJECTION INTRAMUSCULAR; INTRAVENOUS PRN
Status: DISCONTINUED | OUTPATIENT
Start: 2024-05-02 | End: 2024-05-02 | Stop reason: SDUPTHER

## 2024-05-02 RX ORDER — SODIUM CHLORIDE 0.9 % (FLUSH) 0.9 %
5-40 SYRINGE (ML) INJECTION PRN
Status: CANCELLED | OUTPATIENT
Start: 2024-05-02

## 2024-05-02 RX ORDER — LORAZEPAM 2 MG/ML
0.5 INJECTION INTRAMUSCULAR
Status: DISCONTINUED | OUTPATIENT
Start: 2024-05-02 | End: 2024-05-02 | Stop reason: HOSPADM

## 2024-05-02 RX ORDER — OXYCODONE HYDROCHLORIDE 5 MG/1
5 TABLET ORAL PRN
Status: DISCONTINUED | OUTPATIENT
Start: 2024-05-02 | End: 2024-05-02 | Stop reason: HOSPADM

## 2024-05-02 RX ORDER — ONDANSETRON 2 MG/ML
4 INJECTION INTRAMUSCULAR; INTRAVENOUS
Status: DISCONTINUED | OUTPATIENT
Start: 2024-05-02 | End: 2024-05-02 | Stop reason: HOSPADM

## 2024-05-02 RX ORDER — OXYCODONE HYDROCHLORIDE 5 MG/1
10 TABLET ORAL EVERY 4 HOURS PRN
Status: CANCELLED | OUTPATIENT
Start: 2024-05-02

## 2024-05-02 RX ORDER — MEPERIDINE HYDROCHLORIDE 25 MG/ML
12.5 INJECTION INTRAMUSCULAR; INTRAVENOUS; SUBCUTANEOUS EVERY 5 MIN PRN
Status: DISCONTINUED | OUTPATIENT
Start: 2024-05-02 | End: 2024-05-02 | Stop reason: HOSPADM

## 2024-05-02 RX ORDER — LABETALOL HYDROCHLORIDE 5 MG/ML
10 INJECTION, SOLUTION INTRAVENOUS
Status: DISCONTINUED | OUTPATIENT
Start: 2024-05-02 | End: 2024-05-02 | Stop reason: HOSPADM

## 2024-05-02 RX ORDER — SODIUM CHLORIDE, SODIUM LACTATE, POTASSIUM CHLORIDE, CALCIUM CHLORIDE 600; 310; 30; 20 MG/100ML; MG/100ML; MG/100ML; MG/100ML
INJECTION, SOLUTION INTRAVENOUS ONCE
Status: DISCONTINUED | OUTPATIENT
Start: 2024-05-02 | End: 2024-05-02 | Stop reason: HOSPADM

## 2024-05-02 RX ORDER — OXYMETAZOLINE HYDROCHLORIDE 0.05 G/100ML
SPRAY NASAL PRN
Status: DISCONTINUED | OUTPATIENT
Start: 2024-05-02 | End: 2024-05-02 | Stop reason: HOSPADM

## 2024-05-02 RX ORDER — HYDROMORPHONE HYDROCHLORIDE 1 MG/ML
0.5 INJECTION, SOLUTION INTRAMUSCULAR; INTRAVENOUS; SUBCUTANEOUS EVERY 5 MIN PRN
Status: DISCONTINUED | OUTPATIENT
Start: 2024-05-02 | End: 2024-05-02 | Stop reason: HOSPADM

## 2024-05-02 RX ORDER — ONDANSETRON 2 MG/ML
4 INJECTION INTRAMUSCULAR; INTRAVENOUS EVERY 6 HOURS PRN
Status: CANCELLED | OUTPATIENT
Start: 2024-05-02

## 2024-05-02 RX ORDER — SODIUM CHLORIDE 0.9 % (FLUSH) 0.9 %
5-40 SYRINGE (ML) INJECTION PRN
Status: DISCONTINUED | OUTPATIENT
Start: 2024-05-02 | End: 2024-05-02 | Stop reason: HOSPADM

## 2024-05-02 RX ORDER — FENTANYL CITRATE 50 UG/ML
50 INJECTION, SOLUTION INTRAMUSCULAR; INTRAVENOUS EVERY 5 MIN PRN
Status: DISCONTINUED | OUTPATIENT
Start: 2024-05-02 | End: 2024-05-02 | Stop reason: HOSPADM

## 2024-05-02 RX ORDER — FENTANYL CITRATE 50 UG/ML
INJECTION, SOLUTION INTRAMUSCULAR; INTRAVENOUS PRN
Status: DISCONTINUED | OUTPATIENT
Start: 2024-05-02 | End: 2024-05-02 | Stop reason: SDUPTHER

## 2024-05-02 RX ORDER — IPRATROPIUM BROMIDE AND ALBUTEROL SULFATE 2.5; .5 MG/3ML; MG/3ML
1 SOLUTION RESPIRATORY (INHALATION)
Status: DISCONTINUED | OUTPATIENT
Start: 2024-05-02 | End: 2024-05-02 | Stop reason: HOSPADM

## 2024-05-02 RX ORDER — OXYCODONE HYDROCHLORIDE 5 MG/1
5 TABLET ORAL EVERY 4 HOURS PRN
Status: CANCELLED | OUTPATIENT
Start: 2024-05-02

## 2024-05-02 RX ORDER — DIPHENHYDRAMINE HYDROCHLORIDE 50 MG/ML
12.5 INJECTION INTRAMUSCULAR; INTRAVENOUS
Status: DISCONTINUED | OUTPATIENT
Start: 2024-05-02 | End: 2024-05-02 | Stop reason: HOSPADM

## 2024-05-02 RX ORDER — DEXMEDETOMIDINE HYDROCHLORIDE 100 UG/ML
INJECTION, SOLUTION INTRAVENOUS PRN
Status: DISCONTINUED | OUTPATIENT
Start: 2024-05-02 | End: 2024-05-02 | Stop reason: SDUPTHER

## 2024-05-02 RX ORDER — NALOXONE HYDROCHLORIDE 0.4 MG/ML
INJECTION, SOLUTION INTRAMUSCULAR; INTRAVENOUS; SUBCUTANEOUS PRN
Status: DISCONTINUED | OUTPATIENT
Start: 2024-05-02 | End: 2024-05-02 | Stop reason: HOSPADM

## 2024-05-02 RX ORDER — SODIUM CHLORIDE 9 MG/ML
INJECTION, SOLUTION INTRAVENOUS PRN
Status: CANCELLED | OUTPATIENT
Start: 2024-05-02

## 2024-05-02 RX ORDER — SODIUM CHLORIDE, SODIUM LACTATE, POTASSIUM CHLORIDE, CALCIUM CHLORIDE 600; 310; 30; 20 MG/100ML; MG/100ML; MG/100ML; MG/100ML
INJECTION, SOLUTION INTRAVENOUS CONTINUOUS
Status: DISCONTINUED | OUTPATIENT
Start: 2024-05-02 | End: 2024-05-02 | Stop reason: HOSPADM

## 2024-05-02 RX ORDER — EPHEDRINE SULFATE 50 MG/ML
INJECTION INTRAVENOUS PRN
Status: DISCONTINUED | OUTPATIENT
Start: 2024-05-02 | End: 2024-05-02 | Stop reason: SDUPTHER

## 2024-05-02 RX ORDER — SODIUM CHLORIDE 0.9 % (FLUSH) 0.9 %
5-40 SYRINGE (ML) INJECTION EVERY 12 HOURS SCHEDULED
Status: CANCELLED | OUTPATIENT
Start: 2024-05-02

## 2024-05-02 RX ORDER — LIDOCAINE HYDROCHLORIDE AND EPINEPHRINE BITARTRATE 20; .01 MG/ML; MG/ML
INJECTION, SOLUTION SUBCUTANEOUS PRN
Status: DISCONTINUED | OUTPATIENT
Start: 2024-05-02 | End: 2024-05-02 | Stop reason: HOSPADM

## 2024-05-02 RX ORDER — HYDROCODONE BITARTRATE AND ACETAMINOPHEN 5; 325 MG/1; MG/1
1 TABLET ORAL EVERY 4 HOURS PRN
Qty: 20 TABLET | Refills: 0 | Status: SHIPPED | OUTPATIENT
Start: 2024-05-02 | End: 2024-05-05

## 2024-05-02 RX ADMIN — EPHEDRINE SULFATE 10 MG: 50 INJECTION INTRAVENOUS at 09:13

## 2024-05-02 RX ADMIN — SODIUM CHLORIDE, POTASSIUM CHLORIDE, SODIUM LACTATE AND CALCIUM CHLORIDE: 600; 310; 30; 20 INJECTION, SOLUTION INTRAVENOUS at 08:45

## 2024-05-02 RX ADMIN — MIDAZOLAM HYDROCHLORIDE 2 MG: 2 INJECTION, SOLUTION INTRAMUSCULAR; INTRAVENOUS at 08:44

## 2024-05-02 RX ADMIN — CEFAZOLIN SODIUM 2000 MG: 1 INJECTION, POWDER, FOR SOLUTION INTRAMUSCULAR; INTRAVENOUS at 08:55

## 2024-05-02 RX ADMIN — SODIUM CHLORIDE, POTASSIUM CHLORIDE, SODIUM LACTATE AND CALCIUM CHLORIDE: 600; 310; 30; 20 INJECTION, SOLUTION INTRAVENOUS at 07:29

## 2024-05-02 RX ADMIN — FENTANYL CITRATE 100 MCG: 50 INJECTION, SOLUTION INTRAMUSCULAR; INTRAVENOUS at 08:56

## 2024-05-02 RX ADMIN — SUGAMMADEX 200 MG: 100 INJECTION, SOLUTION INTRAVENOUS at 09:46

## 2024-05-02 RX ADMIN — ONDANSETRON 4 MG: 2 INJECTION INTRAMUSCULAR; INTRAVENOUS at 09:16

## 2024-05-02 RX ADMIN — DEXMEDETOMIDINE 20 MCG: 100 INJECTION, SOLUTION INTRAVENOUS at 09:10

## 2024-05-02 RX ADMIN — PROPOFOL 200 MG: 10 INJECTION, EMULSION INTRAVENOUS at 08:59

## 2024-05-02 ASSESSMENT — PAIN - FUNCTIONAL ASSESSMENT
PAIN_FUNCTIONAL_ASSESSMENT: NONE - DENIES PAIN

## 2024-05-02 NOTE — PERIOP NOTE
Discharge instructions reviewed with patient and her sister. Both verbalized understanding. Patient vital signs stable and she shows no signs of distress. IV removed without complication. Patient tolerating fluids and ambulating around unit with no difficulty. Dr. Seo rounded at bedside. Follow up appointments were made and prescriptions sent to patient's preferred pharmacy.     Patient was wheeled down by staff and left in a private vehicle with her sister.   From: Wilner Garcias  To: Joe Corbin  Sent: 9/27/2021 8:29 PM CDT  Subject: Refill    Please refill losartan/hctz 50-12.5mg tab to [facility] mail order  Wilner Garcias

## 2024-05-02 NOTE — ANESTHESIA POSTPROCEDURE EVALUATION
Department of Anesthesiology  Postprocedure Note    Patient: Shae Ling  MRN: [id number]  YOB: 1955  Date of evaluation: 5/2/2024    Procedure Summary       Date: 05/02/24 Room / Location: [facility] MAIN OR 02 / [facility] MAIN OR    Anesthesia Start: 0845 Anesthesia Stop: 0955    Procedure: SEPTOPLASTY WITH COBLATION BILATERAL TURBINATES (Bilateral: Nose) Diagnosis:       Deviated nasal septum      Hypertrophy of nasal turbinates      Otalgia, right      Impacted cerumen of left ear      (Deviated nasal septum [J34.2])      (Hypertrophy of nasal turbinates [J34.3])      (Otalgia, right [H92.01])      (Impacted cerumen of left ear [H61.22])    Surgeons: Greg Seo MD Responsible Provider: Erik Gaston MD    Anesthesia Type: general ASA Status: 2            Anesthesia Type: No value filed.    Josesito Phase I: Josesito Score: 10    Josesito Phase II:      Anesthesia Post Evaluation    Patient location during evaluation: PACU  Patient participation: complete - patient participated  Level of consciousness: sleepy but conscious  Pain score: 0  Airway patency: patent  Nausea & Vomiting: no nausea and no vomiting  Cardiovascular status: hemodynamically stable  Respiratory status: acceptable  Hydration status: stable  Multimodal analgesia pain management approach    No notable events documented.

## 2024-05-02 NOTE — OP NOTE
Operative Note    Patient: Shae Ling  YOB: 1955  MRN: [id number]    Date of Procedure: 5/2/24     Pre-Op Diagnosis: Deviated nasal septum [J34.2]  Hypertrophy of nasal turbinates [J34.3]  Otalgia, right [H92.01]  Impacted cerumen of left ear [H61.22]    Post-Op Diagnosis: Same as preoperative diagnosis.      Procedure(s):  SEPTOPLASTY WITH COBLATION BILATERAL TURBINATES    Surgeon(s):  Greg Seo MD    Surgical Assistant: None    Anesthesia: General     Estimated Blood Loss (mL):  Minimal    Complications: None    Specimens: * No specimens in log *     Implants: * No implants in log *    Drains: * No LDAs found *    Findings: DNS mostly toward the right side obstructing the inferior nasal airway.  Left greater than right inferior turbinate hypertrophy.    Indications: 69-year-old female presents with chronic nasal obstruction refractory to medical management.  Clinical examination reveals significant deviation of the nasal septum and hypertrophy of the inferior turbinates.    Detailed Description of Procedure:     Patient was brought to the operating room placed supine on the table.  General endotracheal anesthesia was obtained and a timeout was performed.  Patient was positioned and draped in the appropriate fashion for nasal surgery.  A total of 8 mL of 1% lidocaine with 1-100,000 parts epinephrine was infiltrated into the nasal septal mucosa bilaterally.  The nose was then packed bilaterally with oxymetazoline neuro patties.  After several minutes the pledgets were removed and intranasal examination revealed deviated nasal septum primarily toward the right side obstructing the inferior nasal airway.  The left inferior turbinate was more hypertrophied.    I made a hemitransfixion incision in the right nasal vestibule and then dissected the mucoperichondrial flap using Ballwin elevator exposing the deviation on the right side.  Dissection is carried out posteriorly to the vomer.

## 2024-05-02 NOTE — PERIOP NOTE
Patient alert and oriented x4, VS stable, no complaints of pain at this time. Sister in waiting room. Bed in low position, call bell within reach.    Patient states okay to review and give discharge instructions to Farida Jack, sister.

## 2024-05-02 NOTE — DISCHARGE INSTRUCTIONS
[facility]  [address] [facility], [address]  [place] [address]  Phone  [phone]   Fax  [phone]    Septoplasty/Turbinectomy Instructions    THE PROCEDURE  · Do not eat or drink anything after midnight before surgery  · Do not take any ibuprofen (Advil®, Motrin®) or Aspirin for 5 days before surgery.  · The surgery is done under general anesthesia and takes about 1 hour.  · The entire procedure is done through the nose. You will have an incision inside the nose, and only absorbable sutures will be used. Packing is rarely used.  · After surgery you will be monitored in the recovery room about 2 hours.      PAIN CONTROL  · You will feel nasal pain and have a dull headache. These will last about 7 days after surgery. You may also have a sore throat. Lozenges or Chloraseptic spray are OK to use.  · You will be given a narcotic pain medicine and an antibiotic.  · Narcotic pain medicines can cause nausea and constipation. You can use a stool softener, and anti-nausea medicine may also be prescribed.  · Ibuprofen (Advil®, Motrin®) can also be taken every 8 hours with food.      DIET  · You may experience nausea and/or vomiting during the first 24 hours.  · It is important to drink plenty of fluids. Liquids and soft foods are best for the first 24 hours. You can gradually advance to regular foods as tolerated.      ACTIVITY  · Rest and no major activity for 3 days after surgery. Short walks in the house are fine.  · Expect to be off work for 7-10 days. 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 Avoid any intense physical activity (gym/exercise/sports) for 2 weeks.  · It is OK to shower.  · You cannot drive while using pain medications.      SURGICAL CARE  · Red or clear drainage is normal 3-6 days after surgery. A nasal drip pad can be used if needed: fold a 4x4 gauze pad in half and tape in place under the

## 2024-05-03 ENCOUNTER — CLINICAL DOCUMENTATION (OUTPATIENT)
Age: 69
End: 2024-05-03

## 2024-05-03 NOTE — PROGRESS NOTES
Prior Authorization    Prior auth submitted via [email or web address]    Insurance Carrier/Provider:[facility]    Medication Name/ Dosage: Trulicity 4.5mg/ 0.5mL Pen     Quantity/Day Supply: 2mL/28 Days    DX: E11.9    Reference Number: [id number]    Outcome: Approved Effective until further     If Denied Reason for Denial: N/A    [facility] notified of prior authorization approval/ Claim submitted with out of pocket cost of $360.00. Per pharmacist medication will have to be order, but patient will be notified when RX is available for  at the pharmacy.

## 2024-05-10 ENCOUNTER — PATIENT MESSAGE (OUTPATIENT)
Age: 69
End: 2024-05-10

## 2024-05-10 DIAGNOSIS — R73.09 ELEVATED HEMOGLOBIN A1C: Primary | ICD-10-CM

## 2024-05-14 NOTE — TELEPHONE ENCOUNTER
From: Shae Ling  To: Dr. Dulce Coto  Sent: 5/10/2024 11:42 AM EDT  Subject: Trulicity    Good morning.    I received notice from my insurance company that Trulicity is now covered. What do you think about me restarting it.     I had Septoplasty recently and my blood work showed an increase in glucose.

## 2024-05-15 ENCOUNTER — OFFICE VISIT (OUTPATIENT)
Age: 69
End: 2024-05-15

## 2024-05-15 VITALS
HEART RATE: 78 BPM | HEIGHT: 69 IN | DIASTOLIC BLOOD PRESSURE: 82 MMHG | WEIGHT: 192 LBS | RESPIRATION RATE: 18 BRPM | OXYGEN SATURATION: 99 % | BODY MASS INDEX: 28.44 KG/M2 | SYSTOLIC BLOOD PRESSURE: 132 MMHG

## 2024-05-15 DIAGNOSIS — J34.2 DEVIATED NASAL SEPTUM: Primary | ICD-10-CM

## 2024-05-15 PROCEDURE — 99024 POSTOP FOLLOW-UP VISIT: CPT | Performed by: OTOLARYNGOLOGY

## 2024-05-21 DIAGNOSIS — F33.1 MAJOR DEPRESSIVE DISORDER, RECURRENT, MODERATE (HCC): ICD-10-CM

## 2024-06-04 RX ORDER — VENLAFAXINE HYDROCHLORIDE 150 MG/1
150 CAPSULE, EXTENDED RELEASE ORAL DAILY
Qty: 90 CAPSULE | Refills: 1 | Status: SHIPPED | OUTPATIENT
Start: 2024-06-04

## 2024-06-04 NOTE — TELEPHONE ENCOUNTER
Medication Refill Request    Shae Ling is requesting a refill of the following medication(s):   Requested Prescriptions     Pending Prescriptions Disp Refills    venlafaxine (EFFEXOR XR) 150 MG extended release capsule [Pharmacy Med Name: VENLAFAXINE ER 150MG CAPSULES] 90 capsule 1     Sig: TAKE 1 CAPSULE BY MOUTH DAILY        Listed PCP is Dulce Coto MD   Last provider to prescribe medication: Dr. Coto  Last Date of Medication Prescribed: 02/15/2024   Date of Last Office Visit at [facility]: 02/08/2024     Future Appointment: No future appointments.    Refill Request Note: Recommended to follow up in 3 Months for Diabetes Follow Up     Please send refill to:    [facility] #[id number] - [place], [place] - [address] - P [phone] - F [phone] [address] [facility]  [facility] [address]  Phone: [phone] Fax: [phone]    [id number] - [place] - [address] - P [phone] - F [phone] [address] [place] [address]  Phone: [phone] Fax: [phone]    [id number]  [place] - [address] [facility] [phone] - F [phone]  [address] [facility]  [place] [address]  Phone: [phone] Fax: [phone]    [facility]/pharmacy #[id number] - [place], [place] - [address] P [phone] - F [phone]  [address] [place] [address]  Phone: [phone] Fax: [phone]    [id number] - [place] - [address] [phone] - F [phone]  [address] [place] [address]  Phone: [phone] Fax: [phone]    [facility] - [place], [place] - [address] [facility] - P [phone] - F [phone]  [facility] [facility]  [address] [place] [address]  Phone: [phone] Fax: [phone]    [facility]/pharmacy #[id number] - [place], [place] - [address] [place] [address] RD - P [phone] - f [phone]  [address] [place] [address]  Phone:

## 2024-07-11 ENCOUNTER — OFFICE VISIT (OUTPATIENT)
Age: 69
End: 2024-07-11
Payer: MEDICARE

## 2024-07-11 VITALS
HEART RATE: 83 BPM | TEMPERATURE: 98.5 F | DIASTOLIC BLOOD PRESSURE: 77 MMHG | OXYGEN SATURATION: 98 % | HEIGHT: 69 IN | BODY MASS INDEX: 29.62 KG/M2 | WEIGHT: 200 LBS | SYSTOLIC BLOOD PRESSURE: 124 MMHG | RESPIRATION RATE: 18 BRPM

## 2024-07-11 DIAGNOSIS — I10 PRIMARY HYPERTENSION: ICD-10-CM

## 2024-07-11 DIAGNOSIS — H40.9 GLAUCOMA, UNSPECIFIED GLAUCOMA TYPE, UNSPECIFIED LATERALITY: ICD-10-CM

## 2024-07-11 DIAGNOSIS — M67.449 GANGLION CYST OF FINGER: Primary | ICD-10-CM

## 2024-07-11 DIAGNOSIS — R73.09 ELEVATED HEMOGLOBIN A1C: ICD-10-CM

## 2024-07-11 PROCEDURE — 99213 OFFICE O/P EST LOW 20 MIN: CPT

## 2024-07-11 ASSESSMENT — PATIENT HEALTH QUESTIONNAIRE - PHQ9
SUM OF ALL RESPONSES TO PHQ QUESTIONS 1-9: 0
SUM OF ALL RESPONSES TO PHQ9 QUESTIONS 1 & 2: 0
SUM OF ALL RESPONSES TO PHQ QUESTIONS 1-9: 0
2. FEELING DOWN, DEPRESSED OR HOPELESS: NOT AT ALL
1. LITTLE INTEREST OR PLEASURE IN DOING THINGS: NOT AT ALL
SUM OF ALL RESPONSES TO PHQ QUESTIONS 1-9: 0
SUM OF ALL RESPONSES TO PHQ QUESTIONS 1-9: 0

## 2024-07-11 NOTE — PROGRESS NOTES
Patient has been identified by name and .    Chief Complaint   Patient presents with    Cyst     Pt reports for painful knot on right hand middle finger, over 3 weeks, soft to touch       Vitals:    24 1420   BP: 124/77   Site: Left Upper Arm   Position: Sitting   Cuff Size: Large Adult   Pulse: 83   Resp: 18   Temp: 98.5 °F (36.9 °C)   TempSrc: Oral   SpO2: 98%   Weight: 90.7 kg (200 lb)   Height: 1.753 m (5' 9\")        \"Have you been to the ER, urgent care clinic since your last visit?  Hospitalized since your last visit?\"    NO    “Have you seen or consulted any other health care providers outside of [facility] since your last visit?”    NO       Have you had a mammogram?”   NO    Date of last Mammogram: 2023

## 2024-07-14 PROBLEM — E11.9 TYPE 2 DIABETES MELLITUS (HCC): Status: ACTIVE | Noted: 2024-07-14

## 2024-07-14 RX ORDER — TIMOLOL MALEATE 5 MG/ML
SOLUTION OPHTHALMIC
Qty: 3 EACH | Refills: 3 | Status: SHIPPED | OUTPATIENT
Start: 2024-07-14

## 2024-07-14 RX ORDER — AMLODIPINE BESYLATE 5 MG/1
5 TABLET ORAL DAILY
Qty: 90 TABLET | Refills: 3 | Status: SHIPPED | OUTPATIENT
Start: 2024-07-14

## 2024-07-24 ENCOUNTER — OFFICE VISIT (OUTPATIENT)
Age: 69
End: 2024-07-24
Payer: MEDICARE

## 2024-07-24 VITALS
TEMPERATURE: 98.3 F | HEIGHT: 69 IN | BODY MASS INDEX: 29.92 KG/M2 | RESPIRATION RATE: 18 BRPM | HEART RATE: 65 BPM | SYSTOLIC BLOOD PRESSURE: 135 MMHG | WEIGHT: 202 LBS | DIASTOLIC BLOOD PRESSURE: 85 MMHG | OXYGEN SATURATION: 98 %

## 2024-07-24 DIAGNOSIS — Z12.31 ENCOUNTER FOR SCREENING MAMMOGRAM FOR MALIGNANT NEOPLASM OF BREAST: ICD-10-CM

## 2024-07-24 DIAGNOSIS — F33.1 MODERATE EPISODE OF RECURRENT MAJOR DEPRESSIVE DISORDER (HCC): ICD-10-CM

## 2024-07-24 DIAGNOSIS — E21.3 HYPERPARATHYROIDISM (HCC): ICD-10-CM

## 2024-07-24 DIAGNOSIS — G89.29 OTHER CHRONIC PAIN: ICD-10-CM

## 2024-07-24 DIAGNOSIS — E11.9 TYPE 2 DIABETES MELLITUS WITHOUT COMPLICATION, WITHOUT LONG-TERM CURRENT USE OF INSULIN (HCC): Primary | ICD-10-CM

## 2024-07-24 DIAGNOSIS — R73.09 ELEVATED HEMOGLOBIN A1C: ICD-10-CM

## 2024-07-24 PROBLEM — H40.1132 PRIMARY OPEN ANGLE GLAUCOMA (POAG) OF BOTH EYES, MODERATE STAGE: Status: ACTIVE | Noted: 2024-07-24

## 2024-07-24 PROBLEM — R82.90 ABNORMAL URINALYSIS: Status: RESOLVED | Noted: 2023-06-17 | Resolved: 2024-07-24

## 2024-07-24 PROBLEM — H61.22 IMPACTED CERUMEN OF LEFT EAR: Status: RESOLVED | Noted: 2024-04-17 | Resolved: 2024-07-24

## 2024-07-24 PROBLEM — J34.2 DEVIATED NASAL SEPTUM: Status: RESOLVED | Noted: 2024-04-17 | Resolved: 2024-07-24

## 2024-07-24 PROBLEM — E66.9 OBESITY: Status: RESOLVED | Noted: 2021-09-02 | Resolved: 2024-07-24

## 2024-07-24 PROBLEM — R10.9 ABDOMINAL PAIN: Status: RESOLVED | Noted: 2023-06-17 | Resolved: 2024-07-24

## 2024-07-24 PROBLEM — S30.851A: Status: RESOLVED | Noted: 2023-02-16 | Resolved: 2024-07-24

## 2024-07-24 PROBLEM — J34.3 HYPERTROPHY OF NASAL TURBINATES: Status: RESOLVED | Noted: 2024-04-17 | Resolved: 2024-07-24

## 2024-07-24 PROBLEM — S93.609A FOOT SPRAIN: Status: RESOLVED | Noted: 2023-12-02 | Resolved: 2024-07-24

## 2024-07-24 PROBLEM — M17.11 PRIMARY OSTEOARTHRITIS OF RIGHT KNEE: Status: RESOLVED | Noted: 2024-01-03 | Resolved: 2024-07-24

## 2024-07-24 PROBLEM — S93.409A ANKLE SPRAIN: Status: RESOLVED | Noted: 2023-12-02 | Resolved: 2024-07-24

## 2024-07-24 PROBLEM — M25.473 ANKLE EFFUSION: Status: RESOLVED | Noted: 2023-12-02 | Resolved: 2024-07-24

## 2024-07-24 LAB
CREAT UR-MCNC: 21.4 MG/DL
MICROALBUMIN UR-MCNC: <0.5 MG/DL
MICROALBUMIN/CREAT UR-RTO: <23 MG/G (ref 0–30)

## 2024-07-24 PROCEDURE — G8417 CALC BMI ABV UP PARAM F/U: HCPCS | Performed by: FAMILY MEDICINE

## 2024-07-24 PROCEDURE — G8427 DOCREV CUR MEDS BY ELIG CLIN: HCPCS | Performed by: FAMILY MEDICINE

## 2024-07-24 PROCEDURE — 3075F SYST BP GE 130 - 139MM HG: CPT | Performed by: FAMILY MEDICINE

## 2024-07-24 PROCEDURE — 1123F ACP DISCUSS/DSCN MKR DOCD: CPT | Performed by: FAMILY MEDICINE

## 2024-07-24 PROCEDURE — 99214 OFFICE O/P EST MOD 30 MIN: CPT | Performed by: FAMILY MEDICINE

## 2024-07-24 PROCEDURE — 1036F TOBACCO NON-USER: CPT | Performed by: FAMILY MEDICINE

## 2024-07-24 PROCEDURE — G8399 PT W/DXA RESULTS DOCUMENT: HCPCS | Performed by: FAMILY MEDICINE

## 2024-07-24 PROCEDURE — 3044F HG A1C LEVEL LT 7.0%: CPT | Performed by: FAMILY MEDICINE

## 2024-07-24 PROCEDURE — 2022F DILAT RTA XM EVC RTNOPTHY: CPT | Performed by: FAMILY MEDICINE

## 2024-07-24 PROCEDURE — 3017F COLORECTAL CA SCREEN DOC REV: CPT | Performed by: FAMILY MEDICINE

## 2024-07-24 PROCEDURE — 3079F DIAST BP 80-89 MM HG: CPT | Performed by: FAMILY MEDICINE

## 2024-07-24 PROCEDURE — G2211 COMPLEX E/M VISIT ADD ON: HCPCS | Performed by: FAMILY MEDICINE

## 2024-07-24 PROCEDURE — 1090F PRES/ABSN URINE INCON ASSESS: CPT | Performed by: FAMILY MEDICINE

## 2024-07-24 RX ORDER — IBUPROFEN 800 MG/1
800 TABLET ORAL EVERY 8 HOURS PRN
Qty: 60 TABLET | Refills: 0 | Status: SHIPPED | OUTPATIENT
Start: 2024-07-24

## 2024-07-24 NOTE — PROGRESS NOTES
Patient has been identified by name and .    Chief Complaint   Patient presents with    Weight Management     Pt presents with F/ U on weight management, gaining weight       Vitals:    24 1259   BP: 135/85   Site: Left Upper Arm   Position: Sitting   Cuff Size: Medium Adult   Pulse: 65   Resp: 18   Temp: 98.3 °F (36.8 °C)   TempSrc: Oral   SpO2: 98%   Weight: 91.6 kg (202 lb)   Height: 1.753 m (5' 9\")        \"Have you been to the ER, urgent care clinic since your last visit?  Hospitalized since your last visit?\"    NO    “Have you seen or consulted any other health care providers outside of [facility] since your last visit?”    NO       Have you had a mammogram?”   NO    Date of last Mammogram: 2023

## 2024-07-24 NOTE — PROGRESS NOTES
History of Present Illness:     Chief Complaint   Patient presents with    Weight Management     Pt presents with F/ U on weight management, gaining weight       Shae Ling is a 69 y.o. female     Now gaining weight  Back on Trulicity   Not eating well, thinks it is result of stressors  Quick and easy foods    Mentally doing well overall  A lot going on but handling it well  Very busy, but with good stuff  Now getting more involved in community  Pretty certain she is getting custody of her grandson    Cut back on her drinking     PMH (REVIEWED):  Past Medical History:   Diagnosis Date    Adverse effect of anesthesia     awakens anxious and agitated at times per pt    Anxiety     Arthritis     Colon polyps     Depression     Diabetes mellitus (HCC) 04/30/2024    history, no longer takes medications    Diverticular disease     Esophageal stricture     Glaucoma     Hearing loss 2000    Heart murmur     mitral valve prolapse    Hemorrhoids     Hypercholesterolemia     Hypertension     Neuropathy     Pineal gland cyst     2 cm    Sleep apnea     not being treated    Tinnitus        Current Medications/Allergies (REVIEWED):     Current Outpatient Medications on File Prior to Visit   Medication Sig Dispense Refill    Potassium 99 MG TABS Potassium      amLODIPine (NORVASC) 5 MG tablet Take 1 tablet by mouth daily 90 tablet 3    timolol (TIMOPTIC-XE) 0.5 % ophthalmic gel-forming INSTILL 1 DROP IN BOTH EYES EVERY DAY IN THE MORNING 3 each 3    venlafaxine (EFFEXOR XR) 150 MG extended release capsule TAKE 1 CAPSULE BY MOUTH DAILY 90 capsule 1    hydroCHLOROthiazide (HYDRODIURIL) 25 MG tablet Take 1 tablet by mouth daily 90 tablet 3    rosuvastatin (CRESTOR) 5 MG tablet TAKE 1 TABLET BY MOUTH EVERY DAY 90 tablet 3    Multiple Vitamin (MULTIVITAMIN PO) Take 1 tablet by mouth Daily      latanoprost (XALATAN) 0.005 % ophthalmic solution INSTILL 1 DROP IN BOTH EYES EVERY DAY IN THE EVENING      Cholecalciferol 50 MCG (2000 UT)

## 2024-07-25 DIAGNOSIS — E21.3 HYPERPARATHYROIDISM (HCC): ICD-10-CM

## 2024-07-25 LAB
25(OH)D3 SERPL-MCNC: 31 NG/ML (ref 30–100)
ANION GAP SERPL CALC-SCNC: 5 MMOL/L (ref 5–15)
BUN SERPL-MCNC: 13 MG/DL (ref 6–20)
BUN/CREAT SERPL: 14 (ref 12–20)
CALCIUM SERPL-MCNC: 10.3 MG/DL (ref 8.5–10.1)
CALCIUM SERPL-MCNC: 10.4 MG/DL (ref 8.5–10.1)
CHLORIDE SERPL-SCNC: 106 MMOL/L (ref 97–108)
CO2 SERPL-SCNC: 30 MMOL/L (ref 21–32)
CREAT SERPL-MCNC: 0.92 MG/DL (ref 0.55–1.02)
EST. AVERAGE GLUCOSE BLD GHB EST-MCNC: 108 MG/DL
GLUCOSE SERPL-MCNC: 79 MG/DL (ref 65–100)
HBA1C MFR BLD: 5.4 % (ref 4–5.6)
POTASSIUM SERPL-SCNC: 3.8 MMOL/L (ref 3.5–5.1)
PTH-INTACT SERPL-MCNC: 66.6 PG/ML (ref 18.4–88)
SODIUM SERPL-SCNC: 141 MMOL/L (ref 136–145)

## 2024-08-06 ENCOUNTER — HOSPITAL ENCOUNTER (OUTPATIENT)
Facility: HOSPITAL | Age: 69
Discharge: HOME | End: 2024-08-09
Payer: MEDICARE

## 2024-08-06 VITALS — HEIGHT: 69 IN | BODY MASS INDEX: 30.96 KG/M2 | WEIGHT: 209 LBS

## 2024-08-06 DIAGNOSIS — Z12.31 ENCOUNTER FOR SCREENING MAMMOGRAM FOR MALIGNANT NEOPLASM OF BREAST: ICD-10-CM

## 2024-08-06 PROCEDURE — 77063 BREAST TOMOSYNTHESIS BI: CPT

## 2024-08-06 PROCEDURE — 77067 SCR MAMMO BI INCL CAD: CPT

## 2024-08-14 ENCOUNTER — PATIENT MESSAGE (OUTPATIENT)
Age: 69
End: 2024-08-14

## 2024-08-14 DIAGNOSIS — E11.9 TYPE 2 DIABETES MELLITUS WITHOUT COMPLICATION, WITHOUT LONG-TERM CURRENT USE OF INSULIN (HCC): Primary | ICD-10-CM

## 2024-08-15 NOTE — TELEPHONE ENCOUNTER
----- Message from Mat Vargas sent at 2/25/2020 11:44 AM EST -----  Regarding: DM EYE EXAM  02/25/20 11:44 AM    Hello, our patient Carline Raya has had Diabetic Eye Exam completed/performed  Please assist in updating the patient chart by pulling the document from the Media Tab  The date of service is        Thank you,  Mat Celis [phone] Pt tolerating Trulicity 1.5mg. Sending 3mg dose to pharmacy.

## 2024-09-04 LAB
CRP SERPL HS-MCNC: 2.5 MG/L
ERYTHROCYTE [SEDIMENTATION RATE] IN BLOOD: 6 MM/HR (ref 0–30)

## 2024-09-12 ENCOUNTER — HOSPITAL ENCOUNTER (OUTPATIENT)
Facility: HOSPITAL | Age: 69
Discharge: HOME | End: 2024-09-15
Payer: MEDICARE

## 2024-09-12 DIAGNOSIS — Z96.651 S/P TOTAL KNEE REPLACEMENT, RIGHT: ICD-10-CM

## 2024-09-12 DIAGNOSIS — G89.29 CHRONIC PAIN OF RIGHT KNEE: ICD-10-CM

## 2024-09-12 DIAGNOSIS — M25.561 CHRONIC PAIN OF RIGHT KNEE: ICD-10-CM

## 2024-09-12 PROCEDURE — 73700 CT LOWER EXTREMITY W/O DYE: CPT

## 2024-09-16 DIAGNOSIS — E11.9 TYPE 2 DIABETES MELLITUS WITHOUT COMPLICATION, WITHOUT LONG-TERM CURRENT USE OF INSULIN (HCC): ICD-10-CM

## 2024-09-17 ENCOUNTER — PATIENT MESSAGE (OUTPATIENT)
Age: 69
End: 2024-09-17

## 2024-09-17 DIAGNOSIS — E11.9 TYPE 2 DIABETES MELLITUS WITHOUT COMPLICATION, WITHOUT LONG-TERM CURRENT USE OF INSULIN (HCC): ICD-10-CM

## 2024-09-17 RX ORDER — DULAGLUTIDE 3 MG/.5ML
INJECTION, SOLUTION SUBCUTANEOUS
Qty: 6 ML | Refills: 2 | Status: SHIPPED | OUTPATIENT
Start: 2024-09-17

## 2024-09-17 RX ORDER — DULAGLUTIDE 4.5 MG/.5ML
4.5 INJECTION, SOLUTION SUBCUTANEOUS WEEKLY
Qty: 2 ML | Refills: 0 | Status: SHIPPED | OUTPATIENT
Start: 2024-09-17

## 2024-10-15 DIAGNOSIS — E11.9 TYPE 2 DIABETES MELLITUS WITHOUT COMPLICATION, WITHOUT LONG-TERM CURRENT USE OF INSULIN (HCC): ICD-10-CM

## 2024-10-15 RX ORDER — DULAGLUTIDE 4.5 MG/.5ML
INJECTION, SOLUTION SUBCUTANEOUS
Qty: 2 ML | Refills: 0 | Status: SHIPPED | OUTPATIENT
Start: 2024-10-15

## 2024-10-15 NOTE — TELEPHONE ENCOUNTER
Medication Refill Request    Shae Ling is requesting a refill of the following medication(s):   Requested Prescriptions     Pending Prescriptions Disp Refills    TRULICITY 4.5 MG/0.5ML SOPN [Pharmacy Med Name: TRULICITY 4.5MG/0.5ML INJ (4 PENS)] 2 mL 0     Sig: ADMINISTER 4.5 MG UNDER THE SKIN 1 TIME A WEEK        Listed PCP is Dulce Coto MD   Last provider to prescribe medication: Dr. Coto   Last Date of Medication Prescribed: 09/17/2024   Date of Last Office Visit at [facility]: 07/24/2024   Last Labs:  Hemoglobin A1C   Date Value Ref Range Status   07/24/2024 5.4 4.0 - 5.6 % Final     Comment:     (NOTE)  HbA1C Interpretive Ranges  <5.7              Normal  5.7 - 6.4         Consider Prediabetes  >6.5              Consider Diabetes         Future Appointment:   Future Appointments   Date Time Provider Department Center   10/29/2024 11:00 AM Dulce Coto MD [facility] DEP     Please send refill to:    "[facility] #[id number] [place], [place] - [address] - P [phone] - F [phone] [address] [facility] [address]  Phone: [phone] Fax: [phone]    [id number] [place]O [place] - [address] - P [phone] - F [phone] [address] [place] [address]  Phone: [phone] Fax: [phone]    [facility][id number] - [place] - [address] [facility] [phone] - F [phone]  [address] [facility]  [place] [address]  Phone: [phone] Fax: [phone]    [facility]pharmacy #[id number] - [place], [place] - [address] - P [phone] - F [phone]  [address] [place] [address]  Phone: [phone] Fax: [phone]    "[facility] #[id number] - [place], [place] - [address] [phone] - F [phone]  [address] [place] [address]  Phone: [phone] Fax: [phone]    [facility] - [place], [place] - [address] -  [phone] - V

## 2024-10-29 ENCOUNTER — OFFICE VISIT (OUTPATIENT)
Age: 69
End: 2024-10-29
Payer: MEDICARE

## 2024-10-29 VITALS
RESPIRATION RATE: 16 BRPM | SYSTOLIC BLOOD PRESSURE: 109 MMHG | WEIGHT: 202.38 LBS | HEIGHT: 69 IN | HEART RATE: 64 BPM | BODY MASS INDEX: 29.98 KG/M2 | DIASTOLIC BLOOD PRESSURE: 70 MMHG | OXYGEN SATURATION: 100 %

## 2024-10-29 DIAGNOSIS — E11.9 TYPE 2 DIABETES MELLITUS WITHOUT COMPLICATION, WITHOUT LONG-TERM CURRENT USE OF INSULIN (HCC): Primary | ICD-10-CM

## 2024-10-29 DIAGNOSIS — E78.00 PURE HYPERCHOLESTEROLEMIA, UNSPECIFIED: ICD-10-CM

## 2024-10-29 DIAGNOSIS — F33.1 MAJOR DEPRESSIVE DISORDER, RECURRENT, MODERATE (HCC): ICD-10-CM

## 2024-10-29 PROCEDURE — 99214 OFFICE O/P EST MOD 30 MIN: CPT | Performed by: FAMILY MEDICINE

## 2024-10-29 RX ORDER — DULAGLUTIDE 4.5 MG/.5ML
0.5 INJECTION, SOLUTION SUBCUTANEOUS WEEKLY
Qty: 6 ML | Refills: 3 | Status: SHIPPED | OUTPATIENT
Start: 2024-10-29

## 2024-10-29 RX ORDER — ROSUVASTATIN CALCIUM 5 MG/1
5 TABLET, COATED ORAL DAILY
Qty: 90 TABLET | Refills: 3 | Status: SHIPPED | OUTPATIENT
Start: 2024-10-29

## 2024-10-29 RX ORDER — VENLAFAXINE HYDROCHLORIDE 150 MG/1
150 CAPSULE, EXTENDED RELEASE ORAL DAILY
Qty: 90 CAPSULE | Refills: 1 | Status: SHIPPED | OUTPATIENT
Start: 2024-10-29

## 2024-10-29 ASSESSMENT — ENCOUNTER SYMPTOMS
COUGH: 0
ABDOMINAL PAIN: 0
SHORTNESS OF BREATH: 0

## 2024-10-29 NOTE — PROGRESS NOTES
Shae Ling (:  1955) is a 69 y.o. female,Established patient, here for evaluation of the following chief complaint(s):  3 Month Follow-Up, Diabetes (Chronic Condition Follow Up), and Hypertension (111/73 Home BP Reading; States BP was slightly elevated this morning )         Assessment & Plan      Weight management  -BMI stable today  -Continue Trulicity 4.5 mg weekly  -Continue with lifestyle changes including diet and exercise  -BMP, lipid panel, A1C today    Hypertension  -Patient no longer taking Amlodipine  -Blood pressure well-controlled with HCTZ 25 mg tablet  -Can trial HCTZ 12.5 mg daily with continued home BP monitoring    Health maintenance  -Flu and COVID-19 vaccines received this fall  -DEXA UTD  -Colonoscopy UTD  -Mammogram UTD  -Pneumonia vaccine received  -Shingles vaccine received         Subjective   Shae Ling is a 69 year old female presenting today for follow up of weight management. Patient is currently taking 4.5 mg Trulicity weekly, which she is tolerating well. She denies any side effects or concerns with this medication and states she feels well overall. Patient continues to walk on the treadmill most days and maintain a healthy diet, incorporating various fresh and frozen vegetables. She denies any recent illnesses or new concerns.    Review of Systems   Constitutional:  Negative for unexpected weight change.   Respiratory:  Negative for cough and shortness of breath.    Cardiovascular:  Negative for chest pain.   Gastrointestinal:  Negative for abdominal pain.   Musculoskeletal:  Negative for arthralgias.   Skin:  Negative for rash.   Neurological:  Negative for headaches.          Objective   Physical Exam  Constitutional:       General: She is not in acute distress.     Appearance: Normal appearance.   HENT:      Head: Normocephalic and atraumatic.   Cardiovascular:      Rate and Rhythm: Normal rate and regular rhythm.   Pulmonary:      Effort: Pulmonary effort is normal. No

## 2024-10-29 NOTE — PROGRESS NOTES
Room  8    Identified pt with two pt identifiers(name and ). Reviewed record in preparation for visit and have obtained necessary documentation.  Chief Complaint   Patient presents with    3 Month Follow-Up    Diabetes     Chronic Condition Follow Up    Hypertension     111/73 Home BP Reading; States BP was slightly elevated this morning         Health Maintenance Due   Topic    Diabetic retinal exam     Pneumococcal 65+ years Vaccine (2 of 2 - PCV)    Flu vaccine (1)    COVID-19 Vaccine (10 - 2023-24 season)    Lipids        Vitals:    10/29/24 1123   BP: 109/70   Site: Right Upper Arm   Position: Sitting   Cuff Size: Large Adult   Pulse: 64   Resp: 16   SpO2: 100%   Weight: 91.8 kg (202 lb 6.1 oz)   Height: 1.753 m (5' 9\")         \"Have you been to the ER, urgent care clinic since your last visit?  Hospitalized since your last visit?\"    NO    “Have you seen or consulted any other health care providers outside of [facility] since your last visit?”    Dr. Dong,  [facility]         Click Here for Release of Records Request     This patient is accompanied in the office by her self.  I have received verbal consent from Shae Ling to discuss any/all medical information while they are present in the room.

## 2024-10-29 NOTE — PROGRESS NOTES
History of Present Illness:     Chief Complaint   Patient presents with    3 Month Follow-Up    Diabetes     Chronic Condition Follow Up    Hypertension     111/73 Home BP Reading; States BP was slightly elevated this morning        Shae Ling is a 69 y.o. female     Doing well now that she is back on Trulicity    BP at home is good  Pt stopped Amlodipine     Mentally doing well overall  A lot going on but handling it well  Practicing mindfulness    PMH (REVIEWED):  Past Medical History:   Diagnosis Date    Adverse effect of anesthesia     awakens anxious and agitated at times per pt    Anxiety     Arthritis     Colon polyps     Depression     Diabetes mellitus (HCC) 04/30/2024    history, no longer takes medications    Diverticular disease     Esophageal stricture     Glaucoma     Hearing loss 2000    Heart murmur     mitral valve prolapse    Hemorrhoids     Hypercholesterolemia     Hypertension     Neuropathy     Pineal gland cyst     2 cm    Sleep apnea     not being treated    Tinnitus        Current Medications/Allergies (REVIEWED):     Current Outpatient Medications on File Prior to Visit   Medication Sig Dispense Refill    ibuprofen (ADVIL;MOTRIN) 800 MG tablet Take 1 tablet by mouth every 8 hours as needed for Pain 60 tablet 0    timolol (TIMOPTIC-XE) 0.5 % ophthalmic gel-forming INSTILL 1 DROP IN BOTH EYES EVERY DAY IN THE MORNING 3 each 3    PSYLLIUM FIBER PO Take 1 capsule by mouth daily      hydroCHLOROthiazide (HYDRODIURIL) 25 MG tablet Take 1 tablet by mouth daily 90 tablet 3    Cholecalciferol 50 MCG (2000 UT) CAPS Take 1 capsule by mouth daily      fluticasone (FLONASE) 50 MCG/ACT nasal spray 2 sprays by Nasal route daily      amLODIPine (NORVASC) 5 MG tablet Take 1 tablet by mouth daily (Patient not taking: Reported on 10/29/2024) 90 tablet 3     No current facility-administered medications on file prior to visit.       Allergies   Allergen Reactions    Shrimp Extract Itching and Swelling     Per

## 2024-10-30 LAB
ANION GAP SERPL CALC-SCNC: 7 MMOL/L (ref 2–12)
BUN SERPL-MCNC: 10 MG/DL (ref 6–20)
BUN/CREAT SERPL: 11 (ref 12–20)
CALCIUM SERPL-MCNC: 10.7 MG/DL (ref 8.5–10.1)
CHLORIDE SERPL-SCNC: 103 MMOL/L (ref 97–108)
CHOLEST SERPL-MCNC: 184 MG/DL
CO2 SERPL-SCNC: 29 MMOL/L (ref 21–32)
CREAT SERPL-MCNC: 0.88 MG/DL (ref 0.55–1.02)
EST. AVERAGE GLUCOSE BLD GHB EST-MCNC: 111 MG/DL
GLUCOSE SERPL-MCNC: 84 MG/DL (ref 65–100)
HBA1C MFR BLD: 5.5 % (ref 4–5.6)
HDLC SERPL-MCNC: 80 MG/DL
HDLC SERPL: 2.3 (ref 0–5)
LDLC SERPL CALC-MCNC: 86.6 MG/DL (ref 0–100)
POTASSIUM SERPL-SCNC: 3.9 MMOL/L (ref 3.5–5.1)
SODIUM SERPL-SCNC: 139 MMOL/L (ref 136–145)
TRIGL SERPL-MCNC: 87 MG/DL
VLDLC SERPL CALC-MCNC: 17.4 MG/DL

## 2024-11-12 DIAGNOSIS — E11.9 TYPE 2 DIABETES MELLITUS WITHOUT COMPLICATION, WITHOUT LONG-TERM CURRENT USE OF INSULIN (HCC): ICD-10-CM

## 2024-11-12 RX ORDER — DULAGLUTIDE 4.5 MG/.5ML
INJECTION, SOLUTION SUBCUTANEOUS
Qty: 2 ML | OUTPATIENT
Start: 2024-11-12

## 2024-11-12 NOTE — TELEPHONE ENCOUNTER
Medication Refill Request    Shae Ling is requesting a refill of the following medication(s):   Requested Prescriptions     Pending Prescriptions Disp Refills    TRULICITY 4.5 MG/0.5ML SOAJ [Pharmacy Med Name: TRULICITY 4.5MG/0.5ML INJ (4 PENS)] 2 mL      Sig: ADMINISTER 4.5 MG UNDER THE SKIN 1 TIME A WEEK        Refill Request Note: Denied, Too Soon for refill. Pres[facility] on 10/29/2024    Please send refill to:    [facility] #[id number] [place], [place] - [address] - P [phone] - F [phone] [address]  [facility] [address]  Phone: [phone] Fax: [phone]    [facility] #[id number] - [place] - [address] - P [phone] - F [phone]  [address] [place] [address]  Phone: [phone] Fax: [phone]    [facility] #[id number] - [place] - [address] [facility] [phone] - F [phone]  [facility] [facility]  [place] [address]  Phone: [phone] Fax: [phone]    [facility]/pharmacy #[id number] - [place], [place] - [address] [phone] - F [phone]  [address] [facility] [address] [place] [address]  Phone: [phone] Fax: [phone]    [id number] - [place] - [address] [phone] - F [phone]  [address] [place] [address]  Phone: [phone] Fax: [phone]    [facility] - [place], [place] - [facility] [address] - P [phone] - F [phone]  [address] [address]  Phone: [phone] Fax: [phone]    [id number] - [place], [place] - [address] [facility] - P [phone] - F [phone]  [id number] [place] [address]  Phone: [phone] Fax: [phone]

## 2024-12-23 ENCOUNTER — HOSPITAL ENCOUNTER (OUTPATIENT)
Facility: HOSPITAL | Age: 69
Discharge: HOME | End: 2024-12-26
Payer: MEDICARE

## 2024-12-23 VITALS
BODY MASS INDEX: 28.91 KG/M2 | WEIGHT: 195.2 LBS | TEMPERATURE: 98 F | RESPIRATION RATE: 16 BRPM | SYSTOLIC BLOOD PRESSURE: 138 MMHG | DIASTOLIC BLOOD PRESSURE: 82 MMHG | OXYGEN SATURATION: 96 % | HEIGHT: 69 IN | HEART RATE: 74 BPM

## 2024-12-23 LAB
ABO + RH BLD: NORMAL
ALBUMIN SERPL-MCNC: 4.2 G/DL (ref 3.5–5)
ALBUMIN/GLOB SERPL: 1.3 (ref 1.1–2.2)
ALP SERPL-CCNC: 75 U/L (ref 45–117)
ALT SERPL-CCNC: 25 U/L (ref 12–78)
ANION GAP SERPL CALC-SCNC: 3 MMOL/L (ref 2–12)
APPEARANCE UR: CLEAR
AST SERPL-CCNC: 19 U/L (ref 15–37)
BACTERIA URNS QL MICRO: ABNORMAL /HPF
BASOPHILS # BLD: 0.1 K/UL (ref 0–0.1)
BASOPHILS NFR BLD: 1 % (ref 0–1)
BILIRUB SERPL-MCNC: 0.9 MG/DL (ref 0.2–1)
BILIRUB UR QL: NEGATIVE
BLOOD GROUP ANTIBODIES SERPL: NORMAL
BUN SERPL-MCNC: 12 MG/DL (ref 6–20)
BUN/CREAT SERPL: 14 (ref 12–20)
CALCIUM SERPL-MCNC: 10.8 MG/DL (ref 8.5–10.1)
CHLORIDE SERPL-SCNC: 104 MMOL/L (ref 97–108)
CO2 SERPL-SCNC: 31 MMOL/L (ref 21–32)
COLOR UR: ABNORMAL
CREAT SERPL-MCNC: 0.85 MG/DL (ref 0.55–1.02)
CRP SERPL-MCNC: <0.29 MG/DL (ref 0–0.3)
DIFFERENTIAL METHOD BLD: ABNORMAL
EOSINOPHIL # BLD: 0.1 K/UL (ref 0–0.4)
EOSINOPHIL NFR BLD: 3 % (ref 0–7)
EPITH CASTS URNS QL MICRO: ABNORMAL /LPF
ERYTHROCYTE [DISTWIDTH] IN BLOOD BY AUTOMATED COUNT: 13.8 % (ref 11.5–14.5)
ERYTHROCYTE [SEDIMENTATION RATE] IN BLOOD: 6 MM/HR (ref 0–30)
EST. AVERAGE GLUCOSE BLD GHB EST-MCNC: 108 MG/DL
GLOBULIN SER CALC-MCNC: 3.3 G/DL (ref 2–4)
GLUCOSE SERPL-MCNC: 85 MG/DL (ref 65–100)
GLUCOSE UR STRIP.AUTO-MCNC: NEGATIVE MG/DL
HBA1C MFR BLD: 5.4 % (ref 4–5.6)
HCT VFR BLD AUTO: 41.5 % (ref 35–47)
HGB BLD-MCNC: 12.8 G/DL (ref 11.5–16)
HGB UR QL STRIP: NEGATIVE
HYALINE CASTS URNS QL MICRO: ABNORMAL /LPF (ref 0–2)
IMM GRANULOCYTES # BLD AUTO: 0 K/UL (ref 0–0.04)
IMM GRANULOCYTES NFR BLD AUTO: 0 % (ref 0–0.5)
KETONES UR QL STRIP.AUTO: NEGATIVE MG/DL
LEUKOCYTE ESTERASE UR QL STRIP.AUTO: NEGATIVE
LYMPHOCYTES # BLD: 1.6 K/UL (ref 0.8–3.5)
LYMPHOCYTES NFR BLD: 32 % (ref 12–49)
MCH RBC QN AUTO: 25.2 PG (ref 26–34)
MCHC RBC AUTO-ENTMCNC: 30.8 G/DL (ref 30–36.5)
MCV RBC AUTO: 81.9 FL (ref 80–99)
MONOCYTES # BLD: 0.4 K/UL (ref 0–1)
MONOCYTES NFR BLD: 7 % (ref 5–13)
NEUTS SEG # BLD: 2.9 K/UL (ref 1.8–8)
NEUTS SEG NFR BLD: 57 % (ref 32–75)
NITRITE UR QL STRIP.AUTO: POSITIVE
NRBC # BLD: 0 K/UL (ref 0–0.01)
NRBC BLD-RTO: 0 PER 100 WBC
PH UR STRIP: 6.5 (ref 5–8)
PLATELET # BLD AUTO: 323 K/UL (ref 150–400)
PMV BLD AUTO: 10.2 FL (ref 8.9–12.9)
POTASSIUM SERPL-SCNC: 3.9 MMOL/L (ref 3.5–5.1)
PROT SERPL-MCNC: 7.5 G/DL (ref 6.4–8.2)
PROT UR STRIP-MCNC: NEGATIVE MG/DL
RBC # BLD AUTO: 5.07 M/UL (ref 3.8–5.2)
RBC #/AREA URNS HPF: ABNORMAL /HPF (ref 0–5)
SODIUM SERPL-SCNC: 138 MMOL/L (ref 136–145)
SP GR UR REFRACTOMETRY: 1.02 (ref 1–1.03)
SPECIMEN EXP DATE BLD: NORMAL
URINE CULTURE IF INDICATED: ABNORMAL
UROBILINOGEN UR QL STRIP.AUTO: 1 EU/DL (ref 0.2–1)
WBC # BLD AUTO: 5.1 K/UL (ref 3.6–11)
WBC URNS QL MICRO: ABNORMAL /HPF (ref 0–4)

## 2024-12-23 PROCEDURE — 84466 ASSAY OF TRANSFERRIN: CPT

## 2024-12-23 PROCEDURE — 93005 ELECTROCARDIOGRAM TRACING: CPT | Performed by: ORTHOPAEDIC SURGERY

## 2024-12-23 PROCEDURE — 86901 BLOOD TYPING SEROLOGIC RH(D): CPT

## 2024-12-23 PROCEDURE — 85652 RBC SED RATE AUTOMATED: CPT

## 2024-12-23 PROCEDURE — 86900 BLOOD TYPING SEROLOGIC ABO: CPT

## 2024-12-23 PROCEDURE — 80053 COMPREHEN METABOLIC PANEL: CPT

## 2024-12-23 PROCEDURE — 86140 C-REACTIVE PROTEIN: CPT

## 2024-12-23 PROCEDURE — 85025 COMPLETE CBC W/AUTO DIFF WBC: CPT

## 2024-12-23 PROCEDURE — 83036 HEMOGLOBIN GLYCOSYLATED A1C: CPT

## 2024-12-23 PROCEDURE — 36415 COLL VENOUS BLD VENIPUNCTURE: CPT

## 2024-12-23 PROCEDURE — 86850 RBC ANTIBODY SCREEN: CPT

## 2024-12-23 PROCEDURE — 81001 URINALYSIS AUTO W/SCOPE: CPT

## 2024-12-23 RX ORDER — CHLORHEXIDINE GLUCONATE ORAL RINSE 1.2 MG/ML
15 SOLUTION DENTAL DAILY
COMMUNITY

## 2024-12-23 RX ORDER — DICYCLOMINE HCL 20 MG
20 TABLET ORAL EVERY 6 HOURS PRN
COMMUNITY

## 2024-12-23 RX ORDER — LATANOPROST 50 UG/ML
1 SOLUTION/ DROPS OPHTHALMIC NIGHTLY
COMMUNITY

## 2024-12-23 RX ORDER — ASPIRIN 81 MG/1
81 TABLET ORAL EVERY MORNING
COMMUNITY

## 2024-12-23 NOTE — PERIOP NOTE
Patient take Trulicity injection every Sunday. Patient confirmed that she understood that she had to hold Trulicity for at least 1 week. Per patient she took a dose on 12/15/24 and will not take it again until after surgery. DOS 1/6/25

## 2024-12-23 NOTE — DISCHARGE INSTRUCTIONS
[facility]                   [address] [place] [address]   PRE-ADMISSION TESTING    [phone]     Surgery Date:  Monday, January 6, 2025       Is surgery arrival time given by surgeon?  YES  NO  If “NO”, [facility] staff will call you between 3 and 7pm the day before your surgery with your arrival time. (If your surgery is on a Monday, we will call you the Friday before.)    Call [phone] after 7pm Monday-Friday if you did not receive this call.    INSTRUCTIONS BEFORE YOUR SURGERY   When You  Arrive Arrive at the 2nd Floor Admitting Desk on the day of your surgery  Have your insurance card, photo ID, and any copayment (if needed)   Food   and   Drink NO food or drink after midnight the night before surgery    This means NO gum, mints, coffee, juice, etc.    You may drink WATER ONLY up until 2 hours prior to your surgery time. Please do not    add anything to your water as this may result in your surgery being postponed.     No alcohol (beer, wine, liquor) 24 hours before and after surgery     Medications to   TAKE   Morning of Surgery MEDICATIONS TO TAKE THE MORNING OF SURGERY WITH A SIP OF WATER:     Venlafaxine, Rosuvastatin  Use eye drops  Dicyclomine if needed    Do NOT take HCTZ the morning of surgery.  Stop taking Trulicity at least one week prior to surgery. Per patient the last dose was on 12/15/24.       Medications  To  STOP      7 days before surgery Non-Steroidal anti-inflammatory Drugs (NSAID's): for example, Ibuprofen (Advil, Motrin), Naproxen (Aleve)  Aspirin   Herbal supplements, vitamins, and fish oil  Other:  (Pain medications not listed above, including Tylenol may be taken)   Blood  Thinners If you take  Aspirin, Plavix, Coumadin, or any blood-thinning or anti-blood clot medicine, talk to the doctor who prescribed the medications for pre-operative instructions.   Bathing Clothing  Jewelry  Valuables     Shower the morning of surgery, please do not

## 2024-12-24 LAB
BACTERIA SPEC CULT: NORMAL
BACTERIA SPEC CULT: NORMAL
SERVICE CMNT-IMP: NORMAL

## 2024-12-25 LAB
EKG ATRIAL RATE: 66 BPM
EKG DIAGNOSIS: NORMAL
EKG P AXIS: 29 DEGREES
EKG P-R INTERVAL: 196 MS
EKG Q-T INTERVAL: 436 MS
EKG QRS DURATION: 84 MS
EKG QTC CALCULATION (BAZETT): 457 MS
EKG R AXIS: 9 DEGREES
EKG T AXIS: 38 DEGREES
EKG VENTRICULAR RATE: 66 BPM
TRANSFERRIN SERPL-MCNC: 264 MG/DL (ref 192–364)

## 2024-12-25 PROCEDURE — 93010 ELECTROCARDIOGRAM REPORT: CPT | Performed by: SPECIALIST

## 2024-12-26 ENCOUNTER — OFFICE VISIT (OUTPATIENT)
Age: 69
End: 2024-12-26
Payer: MEDICARE

## 2024-12-26 VITALS
OXYGEN SATURATION: 98 % | SYSTOLIC BLOOD PRESSURE: 126 MMHG | HEART RATE: 63 BPM | DIASTOLIC BLOOD PRESSURE: 83 MMHG | WEIGHT: 198 LBS | HEIGHT: 69 IN | RESPIRATION RATE: 18 BRPM | BODY MASS INDEX: 29.33 KG/M2 | TEMPERATURE: 98.1 F

## 2024-12-26 DIAGNOSIS — Z96.651 S/P TOTAL KNEE REPLACEMENT, RIGHT: ICD-10-CM

## 2024-12-26 DIAGNOSIS — Z01.818 PREOP EXAMINATION: Primary | ICD-10-CM

## 2024-12-26 DIAGNOSIS — R35.0 FREQUENCY OF MICTURITION: ICD-10-CM

## 2024-12-26 LAB
BILIRUBIN, URINE, POC: NEGATIVE
BLOOD URINE, POC: NEGATIVE
GLUCOSE URINE, POC: NEGATIVE
KETONES, URINE, POC: NEGATIVE
LEUKOCYTE ESTERASE, URINE, POC: NEGATIVE
NITRITE, URINE, POC: NEGATIVE
PH, URINE, POC: 6 (ref 4.6–8)
PROTEIN,URINE, POC: NEGATIVE
SPECIFIC GRAVITY, URINE, POC: 1.02 (ref 1–1.03)
URINALYSIS CLARITY, POC: CLEAR
URINALYSIS COLOR, POC: YELLOW
UROBILINOGEN, POC: NORMAL

## 2024-12-26 PROCEDURE — 81003 URINALYSIS AUTO W/O SCOPE: CPT

## 2024-12-26 PROCEDURE — 99213 OFFICE O/P EST LOW 20 MIN: CPT

## 2024-12-26 ASSESSMENT — PATIENT HEALTH QUESTIONNAIRE - PHQ9
SUM OF ALL RESPONSES TO PHQ QUESTIONS 1-9: 0
2. FEELING DOWN, DEPRESSED OR HOPELESS: NOT AT ALL
1. LITTLE INTEREST OR PLEASURE IN DOING THINGS: NOT AT ALL
SUM OF ALL RESPONSES TO PHQ QUESTIONS 1-9: 0
SUM OF ALL RESPONSES TO PHQ9 QUESTIONS 1 & 2: 0

## 2024-12-26 NOTE — PROGRESS NOTES
Preoperative Evaluation for Shae Ling     12/26/2024  Chief Complaint   Patient presents with    Pre-op Exam     Pt reports for Pre-op exam on 01/06, right knee revision surgery       HPI:   Shae Ling is a 69 y.o. female referred for evaluation by:Dr. Montelongo for Pre- Op Evaluation.  Please see encounter details and orders for consultative summary.     Type of surgery and indication: R knee revision surgery  Surgery site : [facility]  Anesthesia type: general  Date of procedure:  1/6/25    Inherent Risk of Surgery   Surgical risk:  Intermediate  Low:  Cataract, breast, dental, endoscopy, superficial  Intermediate:  Orthopedic, abdominal, thoracic, carotid endarterctomy, prostate, head and neck  High:  Vascular, aortic, emergent, high risk of blood loss, anticipated prolonged    Patient Cardiac Risk Assessment   Revised Cardiac Risk Index (RCRI)  High Risk Surgery  Hx of Heart Failure  Hx of ischemic heart disease  Hx of CVD  DM requiring insulin therapy  Preoperative serum Cr >2.0 mg/dl    Rate if cardiac death, nonfatal MI, nonfatal cardiac arrest by number of risk factor  None - 3.9%  1  6%  2 10.1%  3+ 15%    MAGDIEL/AHA 2007 Guidelines:   1) Surgery Emergency, Non-cardiac -> to surgery  2) If not, look at clinical predictors  Major Intermediate Minor   Unstable CAD (recent MI, severe angina) Mild angina Advanced Age   Decompensated CHF Prior MI Abnormal EKG   Severe Valvular Disease Compensated/Prior CHF Rhythm other than sinus   Arrhythmias (AV block, uncontrolled vent rate, sxs) Diabetes Low METS (<4)    Renal Insuficiency Hx of CVA     Uncontrolled HTN     Coumadin   high risk= mechanical heart valve, VTE with hypercoag state, VTE < 3 months  Low risk= AF w/o CVA, h/o DVT > 3 months and NO hypercoag state    METS     <4 METS >4 METS   Care for self Climb a flight of stairs or a hill   Walk indoors around [facility] Walk on level ground at 4 mph   Walk 2-3 blocks on level ground (2-3 mph) Run a short distance   Light

## 2024-12-26 NOTE — PROGRESS NOTES
I reviewed with the resident the medical history and the resident's findings on the physical examination.  I discussed with the resident the patient's diagnosis and concur with the plan.     Detail Level: Simple Post-Care Instructions: Patient instructed to not lie down for 4 hours and limit physical activity for 24 hours. Map Statement: no t/u needed today Price (Use Numbers Only, No Special Characters Or $): 976 Consent: Written consent obtained. Risks include but not limited to lid/brow ptosis, bruising, swelling, diplopia, temporary effect, incomplete chemical denervation. Total Units: 0 Show Inventory Tab: Hide

## 2024-12-26 NOTE — PROGRESS NOTES
Patient has been identified by name and .    Chief Complaint   Patient presents with    Pre-op Exam     Pt reports for Pre-op exam on , right knee revision surgery       Vitals:    24 1033   BP: 126/83   Site: Left Upper Arm   Position: Sitting   Cuff Size: Medium Adult   Pulse: 63   Resp: 18   Temp: 98.1 °F (36.7 °C)   TempSrc: Oral   SpO2: 98%   Weight: 89.8 kg (198 lb)   Height: 1.753 m (5' 9\")        \"Have you been to the ER, urgent care clinic since your last visit?  Hospitalized since your last visit?\"    NO    “Have you seen or consulted any other health care providers outside of [facility] since your last visit?”    NO

## 2025-01-29 ENCOUNTER — OFFICE VISIT (OUTPATIENT)
Age: 70
End: 2025-01-29
Payer: MEDICARE

## 2025-01-29 VITALS
WEIGHT: 199.4 LBS | HEIGHT: 69 IN | BODY MASS INDEX: 29.53 KG/M2 | HEART RATE: 71 BPM | RESPIRATION RATE: 16 BRPM | SYSTOLIC BLOOD PRESSURE: 109 MMHG | DIASTOLIC BLOOD PRESSURE: 72 MMHG | OXYGEN SATURATION: 100 % | TEMPERATURE: 97.5 F

## 2025-01-29 DIAGNOSIS — E11.9 TYPE 2 DIABETES MELLITUS WITHOUT COMPLICATION, WITHOUT LONG-TERM CURRENT USE OF INSULIN (HCC): ICD-10-CM

## 2025-01-29 DIAGNOSIS — Z96.651 S/P TOTAL KNEE REPLACEMENT, RIGHT: ICD-10-CM

## 2025-01-29 DIAGNOSIS — F33.1 MAJOR DEPRESSIVE DISORDER, RECURRENT, MODERATE (HCC): ICD-10-CM

## 2025-01-29 DIAGNOSIS — F13.24: ICD-10-CM

## 2025-01-29 DIAGNOSIS — Z01.818 PREOP EXAMINATION: Primary | ICD-10-CM

## 2025-01-29 DIAGNOSIS — G89.29 CHRONIC PAIN OF RIGHT KNEE: ICD-10-CM

## 2025-01-29 DIAGNOSIS — M25.561 CHRONIC PAIN OF RIGHT KNEE: ICD-10-CM

## 2025-01-29 DIAGNOSIS — Z01.818 PREOPERATIVE EXAMINATION: ICD-10-CM

## 2025-01-29 PROCEDURE — 1125F AMNT PAIN NOTED PAIN PRSNT: CPT | Performed by: FAMILY MEDICINE

## 2025-01-29 PROCEDURE — 3078F DIAST BP <80 MM HG: CPT | Performed by: FAMILY MEDICINE

## 2025-01-29 PROCEDURE — 1123F ACP DISCUSS/DSCN MKR DOCD: CPT | Performed by: FAMILY MEDICINE

## 2025-01-29 PROCEDURE — 1159F MED LIST DOCD IN RCRD: CPT | Performed by: FAMILY MEDICINE

## 2025-01-29 PROCEDURE — 1036F TOBACCO NON-USER: CPT | Performed by: FAMILY MEDICINE

## 2025-01-29 PROCEDURE — 99213 OFFICE O/P EST LOW 20 MIN: CPT | Performed by: FAMILY MEDICINE

## 2025-01-29 PROCEDURE — 2022F DILAT RTA XM EVC RTNOPTHY: CPT | Performed by: FAMILY MEDICINE

## 2025-01-29 PROCEDURE — 1090F PRES/ABSN URINE INCON ASSESS: CPT | Performed by: FAMILY MEDICINE

## 2025-01-29 PROCEDURE — G8399 PT W/DXA RESULTS DOCUMENT: HCPCS | Performed by: FAMILY MEDICINE

## 2025-01-29 PROCEDURE — 3074F SYST BP LT 130 MM HG: CPT | Performed by: FAMILY MEDICINE

## 2025-01-29 PROCEDURE — 3017F COLORECTAL CA SCREEN DOC REV: CPT | Performed by: FAMILY MEDICINE

## 2025-01-29 PROCEDURE — G8427 DOCREV CUR MEDS BY ELIG CLIN: HCPCS | Performed by: FAMILY MEDICINE

## 2025-01-29 PROCEDURE — 3046F HEMOGLOBIN A1C LEVEL >9.0%: CPT | Performed by: FAMILY MEDICINE

## 2025-01-29 PROCEDURE — G8417 CALC BMI ABV UP PARAM F/U: HCPCS | Performed by: FAMILY MEDICINE

## 2025-01-29 RX ORDER — DULAGLUTIDE 4.5 MG/.5ML
0.5 INJECTION, SOLUTION SUBCUTANEOUS WEEKLY
Qty: 6 ML | Refills: 3 | Status: SHIPPED
Start: 2025-01-29 | End: 2025-01-31 | Stop reason: SDUPTHER

## 2025-01-29 SDOH — ECONOMIC STABILITY: FOOD INSECURITY: WITHIN THE PAST 12 MONTHS, THE FOOD YOU BOUGHT JUST DIDN'T LAST AND YOU DIDN'T HAVE MONEY TO GET MORE.: NEVER TRUE

## 2025-01-29 SDOH — ECONOMIC STABILITY: FOOD INSECURITY: WITHIN THE PAST 12 MONTHS, YOU WORRIED THAT YOUR FOOD WOULD RUN OUT BEFORE YOU GOT MONEY TO BUY MORE.: NEVER TRUE

## 2025-01-29 ASSESSMENT — PATIENT HEALTH QUESTIONNAIRE - PHQ9
2. FEELING DOWN, DEPRESSED OR HOPELESS: NOT AT ALL
10. IF YOU CHECKED OFF ANY PROBLEMS, HOW DIFFICULT HAVE THESE PROBLEMS MADE IT FOR YOU TO DO YOUR WORK, TAKE CARE OF THINGS AT HOME, OR GET ALONG WITH OTHER PEOPLE: NOT DIFFICULT AT ALL
SUM OF ALL RESPONSES TO PHQ QUESTIONS 1-9: 0
7. TROUBLE CONCENTRATING ON THINGS, SUCH AS READING THE NEWSPAPER OR WATCHING TELEVISION: NOT AT ALL
3. TROUBLE FALLING OR STAYING ASLEEP: NOT AT ALL
4. FEELING TIRED OR HAVING LITTLE ENERGY: NOT AT ALL
SUM OF ALL RESPONSES TO PHQ QUESTIONS 1-9: 0
SUM OF ALL RESPONSES TO PHQ9 QUESTIONS 1 & 2: 0
SUM OF ALL RESPONSES TO PHQ QUESTIONS 1-9: 0
8. MOVING OR SPEAKING SO SLOWLY THAT OTHER PEOPLE COULD HAVE NOTICED. OR THE OPPOSITE, BEING SO FIGETY OR RESTLESS THAT YOU HAVE BEEN MOVING AROUND A LOT MORE THAN USUAL: NOT AT ALL
1. LITTLE INTEREST OR PLEASURE IN DOING THINGS: NOT AT ALL
6. FEELING BAD ABOUT YOURSELF - OR THAT YOU ARE A FAILURE OR HAVE LET YOURSELF OR YOUR FAMILY DOWN: NOT AT ALL
5. POOR APPETITE OR OVEREATING: NOT AT ALL
9. THOUGHTS THAT YOU WOULD BE BETTER OFF DEAD, OR OF HURTING YOURSELF: NOT AT ALL
SUM OF ALL RESPONSES TO PHQ QUESTIONS 1-9: 0

## 2025-01-29 NOTE — PROGRESS NOTES
Shae Ling is a 69 y.o. female      Chief Complaint   Patient presents with    Pre-op Exam     Right Knee   February 17th, 2025    - would like to discuss Aspirin 81       \"Have you been to the ER, urgent care clinic since your last visit?  Hospitalized since your last visit?\"    NO    “Have you seen or consulted any other health care providers outside of [facility] since your last visit?”    NO              Vitals:    01/29/25 1307   BP: 109/72   Site: Left Upper Arm   Position: Sitting   Cuff Size: Large Adult   Pulse: 71   Resp: 16   Temp: 97.5 °F (36.4 °C)   TempSrc: Oral   SpO2: 100%   Weight: 90.4 kg (199 lb 6.4 oz)   Height: 1.753 m (5' 9\")            Health Maintenance Due   Topic Date Due    Diabetic retinal exam  Never done    Pneumococcal 65+ years Vaccine (2 of 2 - PCV) 09/02/2022    Annual Wellness Visit (Medicare)  10/30/2024    COVID-19 Vaccine (11 - 2023-24 season) 11/23/2024         Medication Reconciliation completed, changes noted.  Please  Update medication list.    
intermediate risk surgery and may proceed to planned surgery with the above noted risk.    2) Had abnormal UA at Dr. Montelongo's office.Normal repeat UA. No symptoms or concerns today.     3) T2DM, well controlled. Last A1c 5.4%; normalized.  Continue current meds.    4) MDD. Stable on Effexor at current dose.     If major predictors  Delay surgery and manage medically vs angiography   If intermediate predictors Low risk surgery    >4 METS        <4 METS Go to surgery    Go to surgery if intermed. risk, non-invasive testing if high risk surgery    Non-invasive testing   If minor predictors >4 METS  <4 METS Go to surgery  Non-invasive testing if high risk surgery

## 2025-01-31 DIAGNOSIS — E11.9 TYPE 2 DIABETES MELLITUS WITHOUT COMPLICATION, WITHOUT LONG-TERM CURRENT USE OF INSULIN (HCC): ICD-10-CM

## 2025-01-31 RX ORDER — DULAGLUTIDE 4.5 MG/.5ML
0.5 INJECTION, SOLUTION SUBCUTANEOUS WEEKLY
Qty: 6 ML | Refills: 3 | Status: SHIPPED | OUTPATIENT
Start: 2025-01-31

## 2025-01-31 NOTE — TELEPHONE ENCOUNTER
Medication Refill Request    Shae Troy is requesting a refill of the following medication(s):   Requested Prescriptions      No prescriptions requested or ordered in this encounter        Listed PCP is Dulce Coto MD   Last provider to prescribe medication: Dulce Coto MD  Last Date of Medication Prescribed: 01.29.25   Date of Last Office Visit at [facility]: 01.29.25   FUTURE APPOINTMENT:   Future Appointments   Date Time Provider Department Center   2/3/2025  1:00 PM SF PAT ASSESSMENT A [facility] RI OR Pre As       Please send refill to:    [facility] #[id number] [place], [place] - [address] - P [phone] - F [phone] [address] [facility]  [facility] [address]  Phone: [phone] Fax: [phone]    [facility] - [place], [place] - [address] [place] [address] [phone] - F [phone]  [facility] [address] [address]  Phone: [phone] Fax: [phone]      Please review request and approve or deny with recommendations.

## 2025-02-03 ENCOUNTER — HOSPITAL ENCOUNTER (OUTPATIENT)
Facility: HOSPITAL | Age: 70
Discharge: HOME | End: 2025-02-06
Payer: MEDICARE

## 2025-02-03 VITALS
HEART RATE: 72 BPM | RESPIRATION RATE: 14 BRPM | DIASTOLIC BLOOD PRESSURE: 74 MMHG | BODY MASS INDEX: 29.47 KG/M2 | SYSTOLIC BLOOD PRESSURE: 120 MMHG | WEIGHT: 199 LBS | OXYGEN SATURATION: 98 % | HEIGHT: 69 IN

## 2025-02-03 LAB
ABO + RH BLD: NORMAL
ALBUMIN SERPL-MCNC: 4.2 G/DL (ref 3.5–5)
ALBUMIN/GLOB SERPL: 1.4 (ref 1.1–2.2)
ALP SERPL-CCNC: 85 U/L (ref 45–117)
ALT SERPL-CCNC: 22 U/L (ref 12–78)
ANION GAP SERPL CALC-SCNC: 3 MMOL/L (ref 2–12)
APPEARANCE UR: CLEAR
AST SERPL-CCNC: 22 U/L (ref 15–37)
BACTERIA URNS QL MICRO: NEGATIVE /HPF
BASOPHILS # BLD: 0.04 K/UL (ref 0–0.1)
BASOPHILS NFR BLD: 0.8 % (ref 0–1)
BILIRUB SERPL-MCNC: 0.5 MG/DL (ref 0.2–1)
BILIRUB UR QL: NEGATIVE
BLOOD GROUP ANTIBODIES SERPL: NORMAL
BUN SERPL-MCNC: 13 MG/DL (ref 6–20)
BUN/CREAT SERPL: 14 (ref 12–20)
CALCIUM SERPL-MCNC: 11.2 MG/DL (ref 8.5–10.1)
CHLORIDE SERPL-SCNC: 104 MMOL/L (ref 97–108)
CO2 SERPL-SCNC: 31 MMOL/L (ref 21–32)
COLOR UR: NORMAL
CREAT SERPL-MCNC: 0.9 MG/DL (ref 0.55–1.02)
CRP SERPL-MCNC: <0.29 MG/DL (ref 0–0.3)
DIFFERENTIAL METHOD BLD: ABNORMAL
EOSINOPHIL # BLD: 0.21 K/UL (ref 0–0.4)
EOSINOPHIL NFR BLD: 4.3 % (ref 0–7)
EPITH CASTS URNS QL MICRO: NORMAL /LPF
ERYTHROCYTE [DISTWIDTH] IN BLOOD BY AUTOMATED COUNT: 14.2 % (ref 11.5–14.5)
ERYTHROCYTE [SEDIMENTATION RATE] IN BLOOD: 8 MM/HR (ref 0–30)
EST. AVERAGE GLUCOSE BLD GHB EST-MCNC: 114 MG/DL
GLOBULIN SER CALC-MCNC: 3.1 G/DL (ref 2–4)
GLUCOSE SERPL-MCNC: 78 MG/DL (ref 65–100)
GLUCOSE UR STRIP.AUTO-MCNC: NEGATIVE MG/DL
HBA1C MFR BLD: 5.6 % (ref 4–5.6)
HCT VFR BLD AUTO: 41 % (ref 35–47)
HGB BLD-MCNC: 12.5 G/DL (ref 11.5–16)
HGB UR QL STRIP: NEGATIVE
HYALINE CASTS URNS QL MICRO: NORMAL /LPF (ref 0–2)
IMM GRANULOCYTES # BLD AUTO: 0.01 K/UL (ref 0–0.04)
IMM GRANULOCYTES NFR BLD AUTO: 0.2 % (ref 0–0.5)
KETONES UR QL STRIP.AUTO: NEGATIVE MG/DL
LEUKOCYTE ESTERASE UR QL STRIP.AUTO: NEGATIVE
LYMPHOCYTES # BLD: 1.59 K/UL (ref 0.8–3.5)
LYMPHOCYTES NFR BLD: 32.3 % (ref 12–49)
MCH RBC QN AUTO: 25.1 PG (ref 26–34)
MCHC RBC AUTO-ENTMCNC: 30.5 G/DL (ref 30–36.5)
MCV RBC AUTO: 82.2 FL (ref 80–99)
MONOCYTES # BLD: 0.34 K/UL (ref 0–1)
MONOCYTES NFR BLD: 6.9 % (ref 5–13)
NEUTS SEG # BLD: 2.74 K/UL (ref 1.8–8)
NEUTS SEG NFR BLD: 55.5 % (ref 32–75)
NITRITE UR QL STRIP.AUTO: NEGATIVE
NRBC # BLD: 0 K/UL (ref 0–0.01)
NRBC BLD-RTO: 0 PER 100 WBC
PH UR STRIP: 5.5 (ref 5–8)
PLATELET # BLD AUTO: 321 K/UL (ref 150–400)
PMV BLD AUTO: 9.9 FL (ref 8.9–12.9)
POTASSIUM SERPL-SCNC: 4 MMOL/L (ref 3.5–5.1)
PROT SERPL-MCNC: 7.3 G/DL (ref 6.4–8.2)
PROT UR STRIP-MCNC: NEGATIVE MG/DL
RBC # BLD AUTO: 4.99 M/UL (ref 3.8–5.2)
RBC #/AREA URNS HPF: NORMAL /HPF (ref 0–5)
SODIUM SERPL-SCNC: 138 MMOL/L (ref 136–145)
SP GR UR REFRACTOMETRY: 1.01 (ref 1–1.03)
SPECIMEN EXP DATE BLD: NORMAL
URINE CULTURE IF INDICATED: NORMAL
UROBILINOGEN UR QL STRIP.AUTO: 0.2 EU/DL (ref 0.2–1)
WBC # BLD AUTO: 4.9 K/UL (ref 3.6–11)
WBC URNS QL MICRO: NORMAL /HPF (ref 0–4)

## 2025-02-03 PROCEDURE — 80053 COMPREHEN METABOLIC PANEL: CPT

## 2025-02-03 PROCEDURE — 85025 COMPLETE CBC W/AUTO DIFF WBC: CPT

## 2025-02-03 PROCEDURE — 86850 RBC ANTIBODY SCREEN: CPT

## 2025-02-03 PROCEDURE — 86140 C-REACTIVE PROTEIN: CPT

## 2025-02-03 PROCEDURE — 84466 ASSAY OF TRANSFERRIN: CPT

## 2025-02-03 PROCEDURE — 83036 HEMOGLOBIN GLYCOSYLATED A1C: CPT

## 2025-02-03 PROCEDURE — 81001 URINALYSIS AUTO W/SCOPE: CPT

## 2025-02-03 PROCEDURE — 36415 COLL VENOUS BLD VENIPUNCTURE: CPT

## 2025-02-03 PROCEDURE — 85652 RBC SED RATE AUTOMATED: CPT

## 2025-02-03 PROCEDURE — 86901 BLOOD TYPING SEROLOGIC RH(D): CPT

## 2025-02-03 PROCEDURE — 86900 BLOOD TYPING SEROLOGIC ABO: CPT

## 2025-02-03 NOTE — DISCHARGE INSTRUCTIONS
[facility]                   [address] [place] [address]   PRE-ADMISSION TESTING    [phone]     Surgery Date:  2/17/2025 Monday      Is surgery arrival time given by surgeon?  NO  If “NO”, [facility] staff will call you between 3 and 7pm the day before your surgery with your arrival time. (If your surgery is on a Monday, we will call you the Friday before.)    Call [phone] after 7pm Monday-Friday if you did not receive this call.    INSTRUCTIONS BEFORE YOUR SURGERY   When You  Arrive    Arrive at the 2nd Floor Admitting Desk on the day of your surgery     Have your insurance card, photo ID, and any copayment (if needed)   Food   and   Drink    No food or drink (gum, mints, coffee, juice, etc) after midnight the night before surgery.      You may drink WATER ONLY up until 2 hours prior to your surgery time. Please do not       add anything to your water as this may result in your surgery being postponed.        No alcohol (beer, wine, liquor) 24 hours before or after surgery.    Medications to TAKE Morning of Surgery      MEDICATIONS TO TAKE THE MORNING OF SURGERY:          Venlafaxine, Eye drops, Flonase if needed          Medications to STOP 7 Days Before Surgery Non-Steroidal anti-inflammatory Drugs (NSAID's): for example: Ibuprofen, Advil, Motrin, Naproxen, Aleve  Aspirin, if taking for pain  Herbal supplements, vitamins, and fish oil  Other:  Do not take Trulicity on 2/9/25 or 2/16/25   Blood Thinners    If you take Aspirin, Plavix, Coumadin, or any blood-thinning or anti blood-clotting medicine, talk to the doctor who prescribed the medications for pre-operative instructions.    Bathing   Clothing  Jewelry  Valuables       If you shower the morning of surgery, please do not apply anything to your skin (lotions, powders, deodorant, or makeup, especially mascara)  Follow [employer]lor[employer] body wash instructions provided to you during PAT appointment.

## 2025-02-04 LAB
BACTERIA SPEC CULT: NORMAL
BACTERIA SPEC CULT: NORMAL
SERVICE CMNT-IMP: NORMAL
TRANSFERRIN SERPL-MCNC: 252 MG/DL (ref 192–364)

## 2025-02-05 NOTE — PERIOP NOTE
Medication list reviewed and patient on Trulicity. Called patient to confirm she is taking it. She states that she takes her dose on Sundays. She stated her last dose was 2/2/25 and will not be taking another dose prior to surgery.

## 2025-02-14 NOTE — PERIOP NOTE
Hello,     You are scheduled to have surgery Monday  at [facility].     We would like for you to arrive at  1130 am  We are located on the second floor, [address]. You will check-in at the registration desk located outside the elevators on the second floor prior to proceeding to [address].  Remember nothing to eat or drink after midnight on Sunday. If you need to take medications the morning of surgery, please take with a few sips of water.   Wear loose, comfortable clothing and leave all your jewelry at home.   You may bring your cell phone with you.  One family member will be allowed in the pre-op area once you are dressed and your IV has been started.   You will need someone to drive you home and be with you for 24 hours post-anesthesia.     We look forward to seeing you! Call [phone] for questions after hours and [phone] between 5:30AM and 6PM.     Thanks!    [facility] PREOP TEAM

## 2025-02-17 ENCOUNTER — ANESTHESIA (OUTPATIENT)
Facility: HOSPITAL | Age: 70
DRG: 468 | End: 2025-02-17
Payer: MEDICARE

## 2025-02-17 ENCOUNTER — APPOINTMENT (OUTPATIENT)
Facility: HOSPITAL | Age: 70
DRG: 468 | End: 2025-02-17
Attending: ORTHOPAEDIC SURGERY
Payer: MEDICARE

## 2025-02-17 ENCOUNTER — HOSPITAL ENCOUNTER (INPATIENT)
Facility: HOSPITAL | Age: 70
LOS: 1 days | Discharge: HOME | DRG: 468 | End: 2025-02-18
Attending: ORTHOPAEDIC SURGERY | Admitting: ORTHOPAEDIC SURGERY
Payer: MEDICARE

## 2025-02-17 ENCOUNTER — ANESTHESIA EVENT (OUTPATIENT)
Facility: HOSPITAL | Age: 70
DRG: 468 | End: 2025-02-17
Payer: MEDICARE

## 2025-02-17 DIAGNOSIS — G89.18 ACUTE POST-OPERATIVE PAIN: Primary | ICD-10-CM

## 2025-02-17 PROBLEM — T84.032D MECHANICAL LOOSENING OF INTERNAL RIGHT KNEE PROSTHETIC JOINT, SUBSEQUENT ENCOUNTER: Status: ACTIVE | Noted: 2025-02-17

## 2025-02-17 PROBLEM — M25.561 RIGHT KNEE PAIN: Status: ACTIVE | Noted: 2025-02-17

## 2025-02-17 LAB
APPEARANCE SNV: ABNORMAL
COLOR SNV: YELLOW
GLUCOSE BLD STRIP.AUTO-MCNC: 115 MG/DL (ref 65–117)
GLUCOSE BLD STRIP.AUTO-MCNC: 116 MG/DL (ref 65–117)
GLUCOSE BLD STRIP.AUTO-MCNC: 93 MG/DL (ref 65–117)
LYMPHOCYTES NFR SNV MANUAL: 72 % (ref 0–15)
MONOCYTES NFR SNV MANUAL: 3 % (ref 0–65)
NEUTROPHILS NFR SNV MANUAL: 25 % (ref 0–20)
RBC # SNV: >100 /CU MM
SERVICE CMNT-IMP: NORMAL
SPECIMEN SOURCE FLD: ABNORMAL
WBC # SNV: 376 /CU MM (ref 0–150)

## 2025-02-17 PROCEDURE — 87075 CULTR BACTERIA EXCEPT BLOOD: CPT

## 2025-02-17 PROCEDURE — 3600000015 HC SURGERY LEVEL 5 ADDTL 15MIN: Performed by: ORTHOPAEDIC SURGERY

## 2025-02-17 PROCEDURE — 2720000010 HC SURG SUPPLY STERILE: Performed by: ORTHOPAEDIC SURGERY

## 2025-02-17 PROCEDURE — 89050 BODY FLUID CELL COUNT: CPT

## 2025-02-17 PROCEDURE — 2500000003 HC RX 250 WO HCPCS: Performed by: ORTHOPAEDIC SURGERY

## 2025-02-17 PROCEDURE — APPNB30 APP NON BILLABLE TIME 0-30 MINS: Performed by: NURSE PRACTITIONER

## 2025-02-17 PROCEDURE — 7100000001 HC PACU RECOVERY - ADDTL 15 MIN: Performed by: ORTHOPAEDIC SURGERY

## 2025-02-17 PROCEDURE — 2500000003 HC RX 250 WO HCPCS: Performed by: NURSE ANESTHETIST, CERTIFIED REGISTERED

## 2025-02-17 PROCEDURE — 87077 CULTURE AEROBIC IDENTIFY: CPT

## 2025-02-17 PROCEDURE — 2580000003 HC RX 258: Performed by: PHYSICIAN ASSISTANT

## 2025-02-17 PROCEDURE — 3700000001 HC ADD 15 MINUTES (ANESTHESIA): Performed by: ORTHOPAEDIC SURGERY

## 2025-02-17 PROCEDURE — 3E0T3BZ INTRODUCTION OF ANESTHETIC AGENT INTO PERIPHERAL NERVES AND PLEXI, PERCUTANEOUS APPROACH: ICD-10-PCS | Performed by: ANESTHESIOLOGY

## 2025-02-17 PROCEDURE — 6370000000 HC RX 637 (ALT 250 FOR IP): Performed by: PHYSICIAN ASSISTANT

## 2025-02-17 PROCEDURE — 87070 CULTURE OTHR SPECIMN AEROBIC: CPT

## 2025-02-17 PROCEDURE — 87205 SMEAR GRAM STAIN: CPT

## 2025-02-17 PROCEDURE — C1776 JOINT DEVICE (IMPLANTABLE): HCPCS | Performed by: ORTHOPAEDIC SURGERY

## 2025-02-17 PROCEDURE — 6370000000 HC RX 637 (ALT 250 FOR IP): Performed by: ORTHOPAEDIC SURGERY

## 2025-02-17 PROCEDURE — 6360000002 HC RX W HCPCS: Performed by: PHYSICIAN ASSISTANT

## 2025-02-17 PROCEDURE — 6360000002 HC RX W HCPCS: Performed by: NURSE ANESTHETIST, CERTIFIED REGISTERED

## 2025-02-17 PROCEDURE — P9045 ALBUMIN (HUMAN), 5%, 250 ML: HCPCS | Performed by: NURSE ANESTHETIST, CERTIFIED REGISTERED

## 2025-02-17 PROCEDURE — 6360000002 HC RX W HCPCS: Performed by: ANESTHESIOLOGY

## 2025-02-17 PROCEDURE — 7100000000 HC PACU RECOVERY - FIRST 15 MIN: Performed by: ORTHOPAEDIC SURGERY

## 2025-02-17 PROCEDURE — G0378 HOSPITAL OBSERVATION PER HR: HCPCS

## 2025-02-17 PROCEDURE — C1713 ANCHOR/SCREW BN/BN,TIS/BN: HCPCS | Performed by: ORTHOPAEDIC SURGERY

## 2025-02-17 PROCEDURE — 3700000000 HC ANESTHESIA ATTENDED CARE: Performed by: ORTHOPAEDIC SURGERY

## 2025-02-17 PROCEDURE — 0SRV0J9 REPLACEMENT OF RIGHT KNEE JOINT, TIBIAL SURFACE WITH SYNTHETIC SUBSTITUTE, CEMENTED, OPEN APPROACH: ICD-10-PCS | Performed by: ORTHOPAEDIC SURGERY

## 2025-02-17 PROCEDURE — 2500000003 HC RX 250 WO HCPCS: Performed by: PHYSICIAN ASSISTANT

## 2025-02-17 PROCEDURE — 64999 UNLISTED PX NERVOUS SYSTEM: CPT | Performed by: ANESTHESIOLOGY

## 2025-02-17 PROCEDURE — 6360000002 HC RX W HCPCS: Performed by: ORTHOPAEDIC SURGERY

## 2025-02-17 PROCEDURE — 73560 X-RAY EXAM OF KNEE 1 OR 2: CPT

## 2025-02-17 PROCEDURE — 1100000000 HC RM PRIVATE

## 2025-02-17 PROCEDURE — 3600000005 HC SURGERY LEVEL 5 BASE: Performed by: ORTHOPAEDIC SURGERY

## 2025-02-17 PROCEDURE — 6360000002 HC RX W HCPCS: Performed by: STUDENT IN AN ORGANIZED HEALTH CARE EDUCATION/TRAINING PROGRAM

## 2025-02-17 PROCEDURE — 82962 GLUCOSE BLOOD TEST: CPT

## 2025-02-17 PROCEDURE — 2709999900 HC NON-CHARGEABLE SUPPLY: Performed by: ORTHOPAEDIC SURGERY

## 2025-02-17 PROCEDURE — 2580000003 HC RX 258: Performed by: STUDENT IN AN ORGANIZED HEALTH CARE EDUCATION/TRAINING PROGRAM

## 2025-02-17 PROCEDURE — 0SPV0JZ REMOVAL OF SYNTHETIC SUBSTITUTE FROM RIGHT KNEE JOINT, TIBIAL SURFACE, OPEN APPROACH: ICD-10-PCS | Performed by: ORTHOPAEDIC SURGERY

## 2025-02-17 PROCEDURE — 87186 SC STD MICRODIL/AGAR DIL: CPT

## 2025-02-17 DEVICE — TIBIAL AUGMENT HALF BLOCK
Type: IMPLANTABLE DEVICE | Site: KNEE | Status: FUNCTIONAL
Brand: TRIATHLON

## 2025-02-17 DEVICE — CEMENTED STEM
Type: IMPLANTABLE DEVICE | Site: KNEE | Status: FUNCTIONAL
Brand: TRIATHLON

## 2025-02-17 DEVICE — UNIVERSAL TIBIAL BASEPLATE
Type: IMPLANTABLE DEVICE | Site: KNEE | Status: FUNCTIONAL
Brand: TRIATHLON

## 2025-02-17 DEVICE — CEMENT BNE RADIOPAQUE SIMPLEX P SPEEDDET: Type: IMPLANTABLE DEVICE | Site: KNEE | Status: FUNCTIONAL

## 2025-02-17 DEVICE — TIBIAL BEARING INSERT - CS
Type: IMPLANTABLE DEVICE | Site: KNEE | Status: FUNCTIONAL
Brand: TRIATHLON

## 2025-02-17 RX ORDER — TRANEXAMIC ACID 100 MG/ML
INJECTION, SOLUTION INTRAVENOUS
Status: DISCONTINUED | OUTPATIENT
Start: 2025-02-17 | End: 2025-02-17 | Stop reason: SDUPTHER

## 2025-02-17 RX ORDER — FAMOTIDINE 10 MG/ML
INJECTION, SOLUTION INTRAVENOUS
Status: DISCONTINUED | OUTPATIENT
Start: 2025-02-17 | End: 2025-02-17 | Stop reason: SDUPTHER

## 2025-02-17 RX ORDER — MIDAZOLAM HYDROCHLORIDE 1 MG/ML
INJECTION, SOLUTION INTRAMUSCULAR; INTRAVENOUS
Status: DISCONTINUED | OUTPATIENT
Start: 2025-02-17 | End: 2025-02-17 | Stop reason: SDUPTHER

## 2025-02-17 RX ORDER — FENTANYL CITRATE 50 UG/ML
INJECTION, SOLUTION INTRAMUSCULAR; INTRAVENOUS
Status: DISCONTINUED | OUTPATIENT
Start: 2025-02-17 | End: 2025-02-17 | Stop reason: SDUPTHER

## 2025-02-17 RX ORDER — ROSUVASTATIN CALCIUM 5 MG/1
5 TABLET, COATED ORAL EVERY MORNING
Status: DISCONTINUED | OUTPATIENT
Start: 2025-02-18 | End: 2025-02-18 | Stop reason: HOSPADM

## 2025-02-17 RX ORDER — ROPIVACAINE HYDROCHLORIDE 2 MG/ML
INJECTION, SOLUTION EPIDURAL; INFILTRATION; PERINEURAL
Status: DISCONTINUED | OUTPATIENT
Start: 2025-02-17 | End: 2025-02-17 | Stop reason: SDUPTHER

## 2025-02-17 RX ORDER — ALBUMIN HUMAN 50 G/1000ML
SOLUTION INTRAVENOUS
Status: DISCONTINUED | OUTPATIENT
Start: 2025-02-17 | End: 2025-02-17 | Stop reason: SDUPTHER

## 2025-02-17 RX ORDER — ONDANSETRON 2 MG/ML
4 INJECTION INTRAMUSCULAR; INTRAVENOUS EVERY 6 HOURS PRN
Status: DISCONTINUED | OUTPATIENT
Start: 2025-02-17 | End: 2025-02-18 | Stop reason: HOSPADM

## 2025-02-17 RX ORDER — DICYCLOMINE HCL 20 MG
20 TABLET ORAL EVERY 6 HOURS PRN
Status: DISCONTINUED | OUTPATIENT
Start: 2025-02-17 | End: 2025-02-18 | Stop reason: HOSPADM

## 2025-02-17 RX ORDER — LATANOPROST 50 UG/ML
1 SOLUTION/ DROPS OPHTHALMIC NIGHTLY
Status: DISCONTINUED | OUTPATIENT
Start: 2025-02-17 | End: 2025-02-18 | Stop reason: HOSPADM

## 2025-02-17 RX ORDER — SODIUM CHLORIDE 9 MG/ML
INJECTION, SOLUTION INTRAVENOUS CONTINUOUS
Status: DISCONTINUED | OUTPATIENT
Start: 2025-02-17 | End: 2025-02-17 | Stop reason: HOSPADM

## 2025-02-17 RX ORDER — MIDAZOLAM HYDROCHLORIDE 2 MG/2ML
2 INJECTION, SOLUTION INTRAMUSCULAR; INTRAVENOUS
Status: DISCONTINUED | OUTPATIENT
Start: 2025-02-17 | End: 2025-02-17 | Stop reason: HOSPADM

## 2025-02-17 RX ORDER — DIPHENHYDRAMINE HCL 25 MG
25 CAPSULE ORAL EVERY 6 HOURS PRN
Status: DISCONTINUED | OUTPATIENT
Start: 2025-02-17 | End: 2025-02-18 | Stop reason: HOSPADM

## 2025-02-17 RX ORDER — TIMOLOL MALEATE 5 MG/ML
1 SOLUTION/ DROPS OPHTHALMIC 2 TIMES DAILY
Status: DISCONTINUED | OUTPATIENT
Start: 2025-02-17 | End: 2025-02-18 | Stop reason: HOSPADM

## 2025-02-17 RX ORDER — PHENYLEPHRINE HCL IN 0.9% NACL 0.4MG/10ML
SYRINGE (ML) INTRAVENOUS
Status: DISCONTINUED | OUTPATIENT
Start: 2025-02-17 | End: 2025-02-17 | Stop reason: SDUPTHER

## 2025-02-17 RX ORDER — LIDOCAINE HYDROCHLORIDE 20 MG/ML
INJECTION, SOLUTION EPIDURAL; INFILTRATION; INTRACAUDAL; PERINEURAL
Status: DISCONTINUED | OUTPATIENT
Start: 2025-02-17 | End: 2025-02-17 | Stop reason: SDUPTHER

## 2025-02-17 RX ORDER — ASPIRIN 81 MG/1
81 TABLET ORAL 2 TIMES DAILY
Status: DISCONTINUED | OUTPATIENT
Start: 2025-02-17 | End: 2025-02-18 | Stop reason: HOSPADM

## 2025-02-17 RX ORDER — ONDANSETRON 2 MG/ML
INJECTION INTRAMUSCULAR; INTRAVENOUS
Status: DISCONTINUED | OUTPATIENT
Start: 2025-02-17 | End: 2025-02-17 | Stop reason: SDUPTHER

## 2025-02-17 RX ORDER — SODIUM CHLORIDE 0.9 % (FLUSH) 0.9 %
5-40 SYRINGE (ML) INJECTION PRN
Status: DISCONTINUED | OUTPATIENT
Start: 2025-02-17 | End: 2025-02-18 | Stop reason: HOSPADM

## 2025-02-17 RX ORDER — ONDANSETRON 2 MG/ML
4 INJECTION INTRAMUSCULAR; INTRAVENOUS
Status: DISCONTINUED | OUTPATIENT
Start: 2025-02-17 | End: 2025-02-17 | Stop reason: HOSPADM

## 2025-02-17 RX ORDER — ACETAMINOPHEN 325 MG/1
650 TABLET ORAL EVERY 6 HOURS SCHEDULED
Status: DISCONTINUED | OUTPATIENT
Start: 2025-02-17 | End: 2025-02-18 | Stop reason: HOSPADM

## 2025-02-17 RX ORDER — FENTANYL CITRATE 50 UG/ML
100 INJECTION, SOLUTION INTRAMUSCULAR; INTRAVENOUS
Status: DISCONTINUED | OUTPATIENT
Start: 2025-02-17 | End: 2025-02-17 | Stop reason: HOSPADM

## 2025-02-17 RX ORDER — DIPHENHYDRAMINE HYDROCHLORIDE 50 MG/ML
12.5 INJECTION INTRAMUSCULAR; INTRAVENOUS
Status: DISCONTINUED | OUTPATIENT
Start: 2025-02-17 | End: 2025-02-17 | Stop reason: HOSPADM

## 2025-02-17 RX ORDER — SODIUM CHLORIDE, SODIUM LACTATE, POTASSIUM CHLORIDE, CALCIUM CHLORIDE 600; 310; 30; 20 MG/100ML; MG/100ML; MG/100ML; MG/100ML
INJECTION, SOLUTION INTRAVENOUS CONTINUOUS
Status: DISCONTINUED | OUTPATIENT
Start: 2025-02-17 | End: 2025-02-17 | Stop reason: HOSPADM

## 2025-02-17 RX ORDER — HYDROMORPHONE HYDROCHLORIDE 1 MG/ML
1 INJECTION, SOLUTION INTRAMUSCULAR; INTRAVENOUS; SUBCUTANEOUS
Status: DISCONTINUED | OUTPATIENT
Start: 2025-02-17 | End: 2025-02-18 | Stop reason: HOSPADM

## 2025-02-17 RX ORDER — PREGABALIN 75 MG/1
75 CAPSULE ORAL
Status: DISCONTINUED | OUTPATIENT
Start: 2025-02-17 | End: 2025-02-18 | Stop reason: HOSPADM

## 2025-02-17 RX ORDER — FAMOTIDINE 20 MG/1
20 TABLET, FILM COATED ORAL 2 TIMES DAILY
Status: DISCONTINUED | OUTPATIENT
Start: 2025-02-17 | End: 2025-02-18 | Stop reason: HOSPADM

## 2025-02-17 RX ORDER — BISACODYL 5 MG/1
5 TABLET, DELAYED RELEASE ORAL DAILY PRN
Status: DISCONTINUED | OUTPATIENT
Start: 2025-02-17 | End: 2025-02-18 | Stop reason: HOSPADM

## 2025-02-17 RX ORDER — SODIUM CHLORIDE 9 MG/ML
INJECTION, SOLUTION INTRAVENOUS PRN
Status: DISCONTINUED | OUTPATIENT
Start: 2025-02-17 | End: 2025-02-18 | Stop reason: HOSPADM

## 2025-02-17 RX ORDER — VENLAFAXINE HYDROCHLORIDE 150 MG/1
150 CAPSULE, EXTENDED RELEASE ORAL EVERY MORNING
Status: DISCONTINUED | OUTPATIENT
Start: 2025-02-18 | End: 2025-02-18 | Stop reason: HOSPADM

## 2025-02-17 RX ORDER — BISACODYL 10 MG
10 SUPPOSITORY, RECTAL RECTAL DAILY PRN
Status: DISCONTINUED | OUTPATIENT
Start: 2025-02-17 | End: 2025-02-18 | Stop reason: HOSPADM

## 2025-02-17 RX ORDER — POLYETHYLENE GLYCOL 3350 17 G/17G
17 POWDER, FOR SOLUTION ORAL DAILY
Status: DISCONTINUED | OUTPATIENT
Start: 2025-02-17 | End: 2025-02-18 | Stop reason: HOSPADM

## 2025-02-17 RX ORDER — OXYCODONE HYDROCHLORIDE 10 MG/1
10 TABLET ORAL
Status: DISCONTINUED | OUTPATIENT
Start: 2025-02-17 | End: 2025-02-18 | Stop reason: HOSPADM

## 2025-02-17 RX ORDER — PROPOFOL 10 MG/ML
INJECTION, EMULSION INTRAVENOUS
Status: DISCONTINUED | OUTPATIENT
Start: 2025-02-17 | End: 2025-02-17 | Stop reason: SDUPTHER

## 2025-02-17 RX ORDER — EPHEDRINE SULFATE/0.9% NACL/PF 25 MG/5 ML
SYRINGE (ML) INTRAVENOUS
Status: DISCONTINUED | OUTPATIENT
Start: 2025-02-17 | End: 2025-02-17 | Stop reason: SDUPTHER

## 2025-02-17 RX ORDER — DIPHENHYDRAMINE HYDROCHLORIDE 50 MG/ML
25 INJECTION INTRAMUSCULAR; INTRAVENOUS EVERY 6 HOURS PRN
Status: DISCONTINUED | OUTPATIENT
Start: 2025-02-17 | End: 2025-02-18 | Stop reason: HOSPADM

## 2025-02-17 RX ORDER — FLUTICASONE PROPIONATE 50 MCG
2 SPRAY, SUSPENSION (ML) NASAL DAILY PRN
Status: DISCONTINUED | OUTPATIENT
Start: 2025-02-17 | End: 2025-02-18 | Stop reason: HOSPADM

## 2025-02-17 RX ORDER — PREGABALIN 75 MG/1
75 CAPSULE ORAL ONCE
Status: COMPLETED | OUTPATIENT
Start: 2025-02-17 | End: 2025-02-17

## 2025-02-17 RX ORDER — VITAMIN B COMPLEX
2000 TABLET ORAL DAILY
Status: DISCONTINUED | OUTPATIENT
Start: 2025-02-17 | End: 2025-02-18 | Stop reason: HOSPADM

## 2025-02-17 RX ORDER — OXYCODONE HYDROCHLORIDE 5 MG/1
5 TABLET ORAL
Status: DISCONTINUED | OUTPATIENT
Start: 2025-02-17 | End: 2025-02-18 | Stop reason: HOSPADM

## 2025-02-17 RX ORDER — LIDOCAINE HYDROCHLORIDE 10 MG/ML
1 INJECTION, SOLUTION EPIDURAL; INFILTRATION; INTRACAUDAL; PERINEURAL
Status: DISCONTINUED | OUTPATIENT
Start: 2025-02-17 | End: 2025-02-17 | Stop reason: HOSPADM

## 2025-02-17 RX ORDER — ACETAMINOPHEN 325 MG/1
650 TABLET ORAL ONCE
Status: COMPLETED | OUTPATIENT
Start: 2025-02-17 | End: 2025-02-17

## 2025-02-17 RX ORDER — HYDROCHLOROTHIAZIDE 25 MG/1
25 TABLET ORAL DAILY
Qty: 90 TABLET | Refills: 3 | Status: SHIPPED | OUTPATIENT
Start: 2025-02-17

## 2025-02-17 RX ORDER — HYDROCHLOROTHIAZIDE 25 MG/1
25 TABLET ORAL DAILY
Status: DISCONTINUED | OUTPATIENT
Start: 2025-02-17 | End: 2025-02-18 | Stop reason: HOSPADM

## 2025-02-17 RX ORDER — NALOXONE HYDROCHLORIDE 0.4 MG/ML
INJECTION, SOLUTION INTRAMUSCULAR; INTRAVENOUS; SUBCUTANEOUS PRN
Status: DISCONTINUED | OUTPATIENT
Start: 2025-02-17 | End: 2025-02-17 | Stop reason: HOSPADM

## 2025-02-17 RX ORDER — SODIUM CHLORIDE 9 MG/ML
INJECTION, SOLUTION INTRAVENOUS CONTINUOUS
Status: DISCONTINUED | OUTPATIENT
Start: 2025-02-17 | End: 2025-02-18 | Stop reason: HOSPADM

## 2025-02-17 RX ORDER — SODIUM CHLORIDE 0.9 % (FLUSH) 0.9 %
5-40 SYRINGE (ML) INJECTION EVERY 12 HOURS SCHEDULED
Status: DISCONTINUED | OUTPATIENT
Start: 2025-02-17 | End: 2025-02-18 | Stop reason: HOSPADM

## 2025-02-17 RX ORDER — ONDANSETRON 4 MG/1
4 TABLET, ORALLY DISINTEGRATING ORAL EVERY 8 HOURS PRN
Status: DISCONTINUED | OUTPATIENT
Start: 2025-02-17 | End: 2025-02-18 | Stop reason: HOSPADM

## 2025-02-17 RX ADMIN — SODIUM CHLORIDE, PRESERVATIVE FREE 10 ML: 5 INJECTION INTRAVENOUS at 21:19

## 2025-02-17 RX ADMIN — Medication 120 MCG: at 16:19

## 2025-02-17 RX ADMIN — SODIUM CHLORIDE: 9 INJECTION, SOLUTION INTRAVENOUS at 11:56

## 2025-02-17 RX ADMIN — HYDROMORPHONE HYDROCHLORIDE 0.5 MG: 1 INJECTION, SOLUTION INTRAMUSCULAR; INTRAVENOUS; SUBCUTANEOUS at 17:30

## 2025-02-17 RX ADMIN — LIDOCAINE HYDROCHLORIDE 50 MG: 20 INJECTION, SOLUTION EPIDURAL; INFILTRATION; INTRACAUDAL; PERINEURAL at 15:43

## 2025-02-17 RX ADMIN — TIMOLOL MALEATE 1 DROP: 5 SOLUTION OPHTHALMIC at 20:17

## 2025-02-17 RX ADMIN — FAMOTIDINE 20 MG: 20 TABLET, FILM COATED ORAL at 20:17

## 2025-02-17 RX ADMIN — WATER 2000 MG: 1 INJECTION INTRAMUSCULAR; INTRAVENOUS; SUBCUTANEOUS at 14:34

## 2025-02-17 RX ADMIN — Medication 80 MCG: at 15:52

## 2025-02-17 RX ADMIN — Medication 80 MCG: at 15:13

## 2025-02-17 RX ADMIN — EPHEDRINE SULFATE 12.5 MG: 5 INJECTION INTRAVENOUS at 14:41

## 2025-02-17 RX ADMIN — PROPOFOL 100 MCG/KG/MIN: 10 INJECTION, EMULSION INTRAVENOUS at 14:30

## 2025-02-17 RX ADMIN — LIDOCAINE HYDROCHLORIDE 100 MG: 20 INJECTION, SOLUTION EPIDURAL; INFILTRATION; INTRACAUDAL; PERINEURAL at 14:27

## 2025-02-17 RX ADMIN — PREGABALIN 75 MG: 75 CAPSULE ORAL at 11:56

## 2025-02-17 RX ADMIN — WATER 2000 MG: 1 INJECTION INTRAMUSCULAR; INTRAVENOUS; SUBCUTANEOUS at 21:16

## 2025-02-17 RX ADMIN — FENTANYL CITRATE 50 MCG: 50 INJECTION, SOLUTION INTRAMUSCULAR; INTRAVENOUS at 14:27

## 2025-02-17 RX ADMIN — SODIUM CHLORIDE: 9 INJECTION, SOLUTION INTRAVENOUS at 18:34

## 2025-02-17 RX ADMIN — ACETAMINOPHEN 650 MG: 325 TABLET ORAL at 11:57

## 2025-02-17 RX ADMIN — LIDOCAINE HYDROCHLORIDE 50 MG: 20 INJECTION, SOLUTION EPIDURAL; INFILTRATION; INTRACAUDAL; PERINEURAL at 15:02

## 2025-02-17 RX ADMIN — LATANOPROST 1 DROP: 50 SOLUTION OPHTHALMIC at 21:16

## 2025-02-17 RX ADMIN — ALBUMIN (HUMAN) 250 ML: 12.5 INJECTION, SOLUTION INTRAVENOUS at 15:21

## 2025-02-17 RX ADMIN — FENTANYL CITRATE 100 MCG: 50 INJECTION, SOLUTION INTRAMUSCULAR; INTRAVENOUS at 13:31

## 2025-02-17 RX ADMIN — ASPIRIN 81 MG: 81 TABLET, COATED ORAL at 20:17

## 2025-02-17 RX ADMIN — OXYCODONE HYDROCHLORIDE 10 MG: 10 TABLET ORAL at 18:56

## 2025-02-17 RX ADMIN — ROPIVACAINE HYDROCHLORIDE 20 ML: 2 INJECTION, SOLUTION EPIDURAL; INFILTRATION at 13:38

## 2025-02-17 RX ADMIN — PREGABALIN 75 MG: 75 CAPSULE ORAL at 20:17

## 2025-02-17 RX ADMIN — ONDANSETRON 4 MG: 2 INJECTION INTRAMUSCULAR; INTRAVENOUS at 14:22

## 2025-02-17 RX ADMIN — PROPOFOL 180 MG: 10 INJECTION, EMULSION INTRAVENOUS at 14:28

## 2025-02-17 RX ADMIN — TRANEXAMIC ACID 1000 MG: 100 INJECTION, SOLUTION INTRAVENOUS at 14:42

## 2025-02-17 RX ADMIN — ACETAMINOPHEN 650 MG: 325 TABLET ORAL at 23:56

## 2025-02-17 RX ADMIN — FAMOTIDINE 20 MG: 10 INJECTION, SOLUTION INTRAVENOUS at 14:22

## 2025-02-17 RX ADMIN — Medication 120 MCG: at 14:46

## 2025-02-17 RX ADMIN — HYDROMORPHONE HYDROCHLORIDE 0.5 MG: 1 INJECTION, SOLUTION INTRAMUSCULAR; INTRAVENOUS; SUBCUTANEOUS at 21:17

## 2025-02-17 RX ADMIN — MIDAZOLAM HYDROCHLORIDE 2 MG: 1 INJECTION, SOLUTION INTRAMUSCULAR; INTRAVENOUS at 13:31

## 2025-02-17 RX ADMIN — EPHEDRINE SULFATE 12.5 MG: 5 INJECTION INTRAVENOUS at 14:36

## 2025-02-17 ASSESSMENT — PAIN DESCRIPTION - LOCATION
LOCATION: KNEE
LOCATION: KNEE

## 2025-02-17 ASSESSMENT — PAIN SCALES - GENERAL
PAINLEVEL_OUTOF10: 6
PAINLEVEL_OUTOF10: 8
PAINLEVEL_OUTOF10: 7
PAINLEVEL_OUTOF10: 3
PAINLEVEL_OUTOF10: 0
PAINLEVEL_OUTOF10: 1
PAINLEVEL_OUTOF10: 1
PAINLEVEL_OUTOF10: 4
PAINLEVEL_OUTOF10: 1
PAINLEVEL_OUTOF10: 1

## 2025-02-17 ASSESSMENT — PAIN DESCRIPTION - DESCRIPTORS
DESCRIPTORS: ACHING
DESCRIPTORS: ACHING;SORE;PRESSURE

## 2025-02-17 ASSESSMENT — PAIN - FUNCTIONAL ASSESSMENT
PAIN_FUNCTIONAL_ASSESSMENT: ACTIVITIES ARE NOT PREVENTED
PAIN_FUNCTIONAL_ASSESSMENT: 0-10
PAIN_FUNCTIONAL_ASSESSMENT: ACTIVITIES ARE NOT PREVENTED

## 2025-02-17 ASSESSMENT — PAIN DESCRIPTION - ORIENTATION
ORIENTATION: RIGHT
ORIENTATION: RIGHT

## 2025-02-17 NOTE — PERIOP NOTE
TRANSFER - OUT REPORT:    Verbal report given to Ariane LOWE on Shae Ling  being transferred to [facility] for routine post-op       Report consisted of patient's Situation, Background, Assessment and   Recommendations(SBAR).     Information from the following report(s) Surgery Report was reviewed with the receiving nurse.           Lines:   Peripheral IV 02/17/25 Left;Posterior Hand (Active)   Site Assessment Clean, dry & intact 02/17/25 1707   Line Status Infusing 02/17/25 1707   Phlebitis Assessment No symptoms 02/17/25 1707   Infiltration Assessment 0 02/17/25 1707   Dressing Type Transparent 02/17/25 1707   Dressing Intervention New 02/17/25 1707        Opportunity for questions and clarification was provided.      Patient transported with:  Registered Nurse

## 2025-02-17 NOTE — PLAN OF CARE
Problem: Safety - Adult  Goal: Free from fall injury  Outcome: Progressing  Flowsheets (Taken 2/17/2025 1841)  Free From Fall Injury: Instruct family/caregiver on patient safety

## 2025-02-17 NOTE — TELEPHONE ENCOUNTER
Medication Refill Request    Shae Troy is requesting a refill of the following medication(s):   Requested Prescriptions     Pending Prescriptions Disp Refills    hydroCHLOROthiazide (HYDRODIURIL) 25 MG tablet [Pharmacy Med Name: HYDROCHLOROTHIAZIDE 25MG TABLETS] 90 tablet 3     Sig: TAKE 1 TABLET BY MOUTH DAILY        Listed PCP is Dulce Coto MD   Last provider to prescribe medication: Dulce Coto MD  Last Date of Medication Prescribed: 04.22.24   Date of Last Office Visit at [facility]: 01.29.25   FUTURE APPOINTMENT: No future appointments.    Please send refill to:    [id number] [place], [place] - [address] - P [phone] - f [phone] [address]  [facility] [address]  Phone: [phone] Fax: [phone]    [facility] - [place], [place] - [address] [facility] -  [phone] - F [phone]  [facility] [address]  [facility] [address]  Phone: [phone] Fax: [phone]      Please review request and approve or deny with recommendations.

## 2025-02-17 NOTE — ANESTHESIA POSTPROCEDURE EVALUATION
Department of Anesthesiology  Postprocedure Note    Patient: Shae Ling  MRN: [id number]  YOB: 1955  Date of evaluation: 2/17/2025    Procedure Summary       Date: 02/17/25 Room / Location: [facility] ASU OR [address] AMBULATORY OR    Anesthesia Start: 1422 Anesthesia Stop: 1705    Procedure: RIGHT KNEE TIBIAL REVISION (Right: Knee) Diagnosis:       Pain due to total knee replacement, subsequent encounter      (Pain due to total knee replacement, subsequent encounter [T84.84XD, Z96.659])    Surgeons: Sawyer Montelongo MD Responsible Provider: Cade Coronado DO    Anesthesia Type: General, Regional ASA Status: 2            Anesthesia Type: General, Regional    Josesito Phase I: Josesito Score: 8    Josesito Phase II:      Anesthesia Post Evaluation    Patient location during evaluation: PACU  Patient participation: complete - patient participated  Level of consciousness: awake and sleepy but conscious  Airway patency: patent  Nausea & Vomiting: no vomiting and no nausea  Cardiovascular status: hemodynamically stable  Respiratory status: acceptable  Hydration status: stable  Comments: Patient seen and examined.  Ready for discharge from PACU.  Pain management: adequate    No notable events documented.

## 2025-02-17 NOTE — ANESTHESIA PROCEDURE NOTES
Peripheral Block    Patient location during procedure: pre-op  Reason for block: procedure for pain, post-op pain management, primary anesthetic and at surgeon's request  Start time: 2/17/2025 1:31 PM  End time: 2/17/2025 1:38 PM  Staffing  Performed: anesthesiologist   Anesthesiologist: Juanjose Valenzuela MD  Performed by: Juanjose Valenzuela MD  Authorized by: Juanjose Valenzuela MD    Preanesthetic Checklist  Completed: patient identified, IV checked, site marked, risks and benefits discussed, surgical/procedural consents, pre-op evaluation, timeout performed, anesthesia consent given, oxygen available and monitors applied/VS acknowledged  Peripheral Block   Patient position: supine  Prep: ChloraPrep  Provider prep: mask and sterile gloves  Patient monitoring: cardiac monitor, continuous pulse ox, continuous capnometry, frequent blood pressure checks, IV access, oxygen and responsive to questions  Block type: iPacks  Laterality: right  Injection technique: single-shot  Guidance: ultrasound guided    Needle   Needle type: Other   Needle gauge: 21 G  Needle localization: ultrasound guidance  Needle length: 10 cmOther needle type: STIMUPLEX  Assessment   Injection assessment: negative aspiration for heme, no paresthesia on injection, local visualized surrounding nerve on ultrasound and no intravascular symptoms  Hemodynamics: stable  Outcomes: patient tolerated procedure well    Additional Notes  Jenny LOWE witnessed timeout and block written on correct side

## 2025-02-17 NOTE — OP NOTE
OPERATIVE REPORT     Admit Date: 2/17/2025  Admit Diagnosis: Pain due to total knee replacement, subsequent encounter [T84.84XD, Z96.659]  Mechanical loosening of internal right knee prosthetic joint, subsequent encounter [T84.032D]    Date of Procedure: [unfilled]   Preoperative Diagnosis: Pain due to total knee replacement, subsequent encounter [T84.84XD, Z96.659]  Postoperative Diagnosis: Same   Procedure: Procedure(s):  RIGHT KNEE TIBIAL REVISION  Surgeon: Sawyer Montelongo MD  Assistant(s): Don Louis PA-C  Anesthesia: Spinal   Estimated Blood Loss: 300cc  Specimens:   ID Type Source Tests Collected by Time Destination   A : RIGHT KNEE ASPIRATION Swab Joint, Knee SYNOVIAL FLUID, CELL COUNT, CULTURE, ANAEROBIC, CULTURE, WOUND (WITH GRAM STAIN) Sawyer Montelongo MD 2/17/2025 1451    B : RIGHT TIBIAL BASE PLATE Swab Joint, Knee CULTURE, ANAEROBIC, CULTURE, WOUND (WITH GRAM STAIN) Sawyer Montelongo MD 2/17/2025 1502       Complications: None          INDICATIONS:     The patient is a 69 y.o., female who has a 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 The patient underwent the appropriate preoperative labs and was indicated for surgery. Informed consent obtained including a discussion of the risks and benefits, which include, but are not limited to, bleeding, infection, neurovascular damage, wound complications, pain and stiffness in the knee, periprosthetic loosening, fracture dislocation and DVT, the patient consented for the procedure.     DESCRIPTION OF PROCEDURE:     The proper limb was identified and signed in the preoperative holding area. All questions were answered.        After adequate anesthesia, the right lower extremity was prepped and draped in sterile fashion.  The patient was positioned supine on the operating room table. Using the old incision a mid-line parapatellar incision was used along with medial arthrotomy. Excess or residual scar was excised if non-viable. The medial and lateral

## 2025-02-17 NOTE — ANESTHESIA PRE PROCEDURE
Value Date/Time    COVID19 Not detected 08/18/2022 01:18 PM           Anesthesia Evaluation  Patient summary reviewed and Nursing notes reviewed   history of anesthetic complications: postop delirium.  Airway: Mallampati: II  TM distance: >3 FB   Neck ROM: full  Mouth opening: > = 3 FB   Dental:    (+) implants      Pulmonary:normal exam  breath sounds clear to auscultation  (+)     sleep apnea: on noncompliant,                                  Cardiovascular:  Exercise tolerance: poor (<4 METS)  (+) hypertension:, hyperlipidemia      NYHA Classification: II  ECG reviewed  Rhythm: regular  Rate: normal           Beta Blocker:  Not on Beta Blocker         Neuro/Psych:   (+) psychiatric history:depression/anxiety             GI/Hepatic/Renal:   (+) GERD:          Endo/Other:    (+) DiabetesType II DM.                 Abdominal:             Vascular:          Other Findings:       Anesthesia Plan      general     ASA 2       Induction: intravenous.  continuous noninvasive hemodynamic monitor  MIPS: Postoperative opioids intended and Prophylactic antiemetics administered.  Anesthetic plan and risks discussed with patient.    Use of blood products discussed with whom consented to blood products.    Plan discussed with CRNA.    Attending anesthesiologist reviewed and agrees with Preprocedure content      Post-op pain plan if not by surgeon: single peripheral nerve block        Juanjose Valenzuela MD   2/17/2025

## 2025-02-18 VITALS
SYSTOLIC BLOOD PRESSURE: 118 MMHG | RESPIRATION RATE: 16 BRPM | TEMPERATURE: 97.9 F | HEIGHT: 69 IN | HEART RATE: 57 BPM | DIASTOLIC BLOOD PRESSURE: 75 MMHG | BODY MASS INDEX: 29.71 KG/M2 | OXYGEN SATURATION: 97 % | WEIGHT: 200.62 LBS

## 2025-02-18 LAB
ANION GAP SERPL CALC-SCNC: 1 MMOL/L (ref 2–12)
BACTERIA SPEC CULT: NORMAL
BUN SERPL-MCNC: 12 MG/DL (ref 6–20)
BUN/CREAT SERPL: 13 (ref 12–20)
CALCIUM SERPL-MCNC: 8.7 MG/DL (ref 8.5–10.1)
CHLORIDE SERPL-SCNC: 109 MMOL/L (ref 97–108)
CO2 SERPL-SCNC: 28 MMOL/L (ref 21–32)
CREAT SERPL-MCNC: 0.94 MG/DL (ref 0.55–1.02)
GLUCOSE BLD STRIP.AUTO-MCNC: 113 MG/DL (ref 65–117)
GLUCOSE BLD STRIP.AUTO-MCNC: 92 MG/DL (ref 65–117)
GLUCOSE SERPL-MCNC: 109 MG/DL (ref 65–100)
HCT VFR BLD AUTO: 36.4 % (ref 35–47)
HGB BLD-MCNC: 11 G/DL (ref 11.5–16)
POTASSIUM SERPL-SCNC: 4.4 MMOL/L (ref 3.5–5.1)
SERVICE CMNT-IMP: NORMAL
SODIUM SERPL-SCNC: 138 MMOL/L (ref 136–145)

## 2025-02-18 PROCEDURE — 97116 GAIT TRAINING THERAPY: CPT

## 2025-02-18 PROCEDURE — 6370000000 HC RX 637 (ALT 250 FOR IP): Performed by: PHYSICIAN ASSISTANT

## 2025-02-18 PROCEDURE — 97165 OT EVAL LOW COMPLEX 30 MIN: CPT

## 2025-02-18 PROCEDURE — 85018 HEMOGLOBIN: CPT

## 2025-02-18 PROCEDURE — 36415 COLL VENOUS BLD VENIPUNCTURE: CPT

## 2025-02-18 PROCEDURE — 97535 SELF CARE MNGMENT TRAINING: CPT

## 2025-02-18 PROCEDURE — 2500000003 HC RX 250 WO HCPCS: Performed by: PHYSICIAN ASSISTANT

## 2025-02-18 PROCEDURE — APPNB45 APP NON BILLABLE 31-45 MINUTES: Performed by: NURSE PRACTITIONER

## 2025-02-18 PROCEDURE — 97161 PT EVAL LOW COMPLEX 20 MIN: CPT

## 2025-02-18 PROCEDURE — 82962 GLUCOSE BLOOD TEST: CPT

## 2025-02-18 PROCEDURE — 94761 N-INVAS EAR/PLS OXIMETRY MLT: CPT

## 2025-02-18 PROCEDURE — 80048 BASIC METABOLIC PNL TOTAL CA: CPT

## 2025-02-18 PROCEDURE — APPNB30 APP NON BILLABLE TIME 0-30 MINS: Performed by: NURSE PRACTITIONER

## 2025-02-18 PROCEDURE — 6360000002 HC RX W HCPCS: Performed by: PHYSICIAN ASSISTANT

## 2025-02-18 PROCEDURE — 97110 THERAPEUTIC EXERCISES: CPT

## 2025-02-18 PROCEDURE — 85014 HEMATOCRIT: CPT

## 2025-02-18 RX ORDER — ACETAMINOPHEN 325 MG/1
650 TABLET ORAL EVERY 4 HOURS
Qty: 120 TABLET | Refills: 1 | Status: SHIPPED | OUTPATIENT
Start: 2025-02-18 | End: 2025-03-20

## 2025-02-18 RX ORDER — ASPIRIN 81 MG/1
81 TABLET ORAL 2 TIMES DAILY
Qty: 60 TABLET | Refills: 0 | Status: SHIPPED | OUTPATIENT
Start: 2025-02-18

## 2025-02-18 RX ORDER — ONDANSETRON 4 MG/1
4 TABLET, ORALLY DISINTEGRATING ORAL EVERY 8 HOURS PRN
Qty: 10 TABLET | Refills: 0 | Status: SHIPPED | OUTPATIENT
Start: 2025-02-18

## 2025-02-18 RX ORDER — TRAMADOL HYDROCHLORIDE 50 MG/1
50 TABLET ORAL EVERY 6 HOURS PRN
Qty: 20 TABLET | Refills: 0 | Status: SHIPPED | OUTPATIENT
Start: 2025-02-18 | End: 2025-02-25

## 2025-02-18 RX ORDER — OXYCODONE HYDROCHLORIDE 5 MG/1
5 TABLET ORAL EVERY 4 HOURS PRN
Qty: 42 TABLET | Refills: 0 | Status: SHIPPED | OUTPATIENT
Start: 2025-02-18 | End: 2025-02-25

## 2025-02-18 RX ADMIN — OXYCODONE 5 MG: 5 TABLET ORAL at 02:38

## 2025-02-18 RX ADMIN — POLYETHYLENE GLYCOL 3350 17 G: 17 POWDER, FOR SOLUTION ORAL at 08:55

## 2025-02-18 RX ADMIN — OXYCODONE 5 MG: 5 TABLET ORAL at 09:07

## 2025-02-18 RX ADMIN — ROSUVASTATIN CALCIUM 5 MG: 5 TABLET, FILM COATED ORAL at 08:55

## 2025-02-18 RX ADMIN — WATER 2000 MG: 1 INJECTION INTRAMUSCULAR; INTRAVENOUS; SUBCUTANEOUS at 06:10

## 2025-02-18 RX ADMIN — SODIUM CHLORIDE, PRESERVATIVE FREE 10 ML: 5 INJECTION INTRAVENOUS at 08:56

## 2025-02-18 RX ADMIN — VENLAFAXINE HYDROCHLORIDE 150 MG: 150 CAPSULE, EXTENDED RELEASE ORAL at 08:54

## 2025-02-18 RX ADMIN — ASPIRIN 81 MG: 81 TABLET, COATED ORAL at 08:54

## 2025-02-18 RX ADMIN — Medication 2000 UNITS: at 08:54

## 2025-02-18 RX ADMIN — ACETAMINOPHEN 650 MG: 325 TABLET ORAL at 12:05

## 2025-02-18 RX ADMIN — HYDROMORPHONE HYDROCHLORIDE 0.5 MG: 1 INJECTION, SOLUTION INTRAMUSCULAR; INTRAVENOUS; SUBCUTANEOUS at 03:44

## 2025-02-18 RX ADMIN — TIMOLOL MALEATE 1 DROP: 5 SOLUTION OPHTHALMIC at 09:08

## 2025-02-18 RX ADMIN — FAMOTIDINE 20 MG: 20 TABLET, FILM COATED ORAL at 08:54

## 2025-02-18 RX ADMIN — ACETAMINOPHEN 650 MG: 325 TABLET ORAL at 06:07

## 2025-02-18 ASSESSMENT — PAIN DESCRIPTION - LOCATION
LOCATION: KNEE
LOCATION: KNEE

## 2025-02-18 ASSESSMENT — PAIN SCALES - GENERAL
PAINLEVEL_OUTOF10: 4
PAINLEVEL_OUTOF10: 4
PAINLEVEL_OUTOF10: 3
PAINLEVEL_OUTOF10: 1
PAINLEVEL_OUTOF10: 2
PAINLEVEL_OUTOF10: 5
PAINLEVEL_OUTOF10: 3
PAINLEVEL_OUTOF10: 1

## 2025-02-18 ASSESSMENT — PAIN DESCRIPTION - ORIENTATION
ORIENTATION: RIGHT
ORIENTATION: RIGHT

## 2025-02-18 ASSESSMENT — PAIN DESCRIPTION - DESCRIPTORS
DESCRIPTORS: ACHING
DESCRIPTORS: ACHING

## 2025-02-18 NOTE — PLAN OF CARE
Problem: Chronic Conditions and Co-morbidities  Goal: Patient's chronic conditions and co-morbidity symptoms are monitored and maintained or improved  Outcome: Progressing     Problem: ABCDS Injury Assessment  Goal: Absence of physical injury  Outcome: Progressing     Problem: Pain  Goal: Verbalizes/displays adequate comfort level or baseline comfort level  Outcome: Progressing     Problem: Safety - Adult  Goal: Free from fall injury  2/18/2025 0408 by Marie Taveras, RN  Outcome: Progressing  2/17/2025 1841 by Ivett Melgoza, RN  Outcome: Progressing  Flowsheets (Taken 2/17/2025 1841)  Free From Fall Injury: Instruct family/caregiver on patient safety     Problem: Discharge Planning  Goal: Discharge to home or other facility with appropriate resources  Outcome: Progressing

## 2025-02-18 NOTE — PROGRESS NOTES
Rounded on patient  Patient alert and oriented  Follow up from pre-op Joint Patient Education Class.   She had her knee replacement in Jan. Has the knee patient education book and has reviewed.  Patient states information was valuable in preparing for surgery.   Patient states their home space is prepared and safe for recovery.   Reviewed plan of care with patient, including pain management, positioning,  mobility precautions, ice application, leg positioning, exercises and incentive spirometry use.   Reviewed call don't fall expectations.  Opportunity provided for patient to ask questions and provide comments.  Questions answered.

## 2025-02-18 NOTE — PLAN OF CARE
Problem: Physical Therapy - Adult  Goal: By Discharge: Performs mobility at highest level of function for planned discharge setting.  See evaluation for individualized goals.  Outcome: Progressing   PHYSICAL THERAPY EVALUATION/DISCHARGE    Patient: Shae Ling (69 y.o. female)  Date: 2/18/2025  Primary Diagnosis: Pain due to total knee replacement, subsequent encounter [T84.84XD, Z96.659]  Mechanical loosening of internal right knee prosthetic joint, subsequent encounter [T84.153D]  Procedure(s) (LRB):  RIGHT KNEE TIBIAL REVISION (Right) 1 Day Post-Op   Precautions: Weight Bearing (WBAT RLE) Right Lower Extremity Weight Bearing: Weight Bearing As Tolerated                    ASSESSMENT AND RECOMMENDATIONS:  Based on the objective data below, the patient is post op day #1 from R TKA tibial revision.  She is received sitting up in recliner with VSS.  Pt educated with TKA ex with handout and performed all well x10.  Pt reporting 4/10 pain at this time with overall good pain management.  CGA to SBA with sit<>stand with RW.  Educated with proper gait pattern.  Gait of 200' tolerated well with step-thru pattern with CGA to SBA.  Pt declining stair management at this time stating has stair lift at home.  Toileted with SBA and returned to recliner with BLE elevated with cold pack and spirometry education.  RN informed of PT clearance for discharge home today with spouse.      Functional Outcome Measure:  The patient scored 22/24 on the [facility] outcome measure         Further skilled acute physical therapy is not indicated at this time.       PLAN :  Recommendation for discharge: (in order for the patient to meet his/her long term goals):   Outpatient physical therapy     Other factors to consider for discharge:  per above    IF patient discharges home will need the following DME: rolling walker ordered  1. The patient has a mobility limitation that significantly impairs their ability to participate in one or more  disabled due to CVA  Social/Functional History  Lives With: Spouse  Type of Home: House  Home Layout: Able to Live on Main level with bedroom/bathroom, Two level  Home Access: Elevator  Bathroom Shower/Tub: Shower chair without back, Shower chair with back  Bathroom Toilet: Handicap height  Bathroom Equipment: Grab bars in shower  Has the patient had two or more falls in the past year or any fall with injury in the past year?: No  Receives Help From: Family  Prior Level of Assist for ADLs: Independent  Prior Level of Assist for Homemaking: Independent  Homemaking Responsibilities: Yes  Prior Level of Assist for Ambulation: Independent household ambulator, with or without device  Prior Level of Assist for Transfers: Independent  Active : Yes  Critical Behavior:  Orientation  Overall Orientation Status: Within Normal Limits  Orientation Level: Oriented X4  Cognition  Overall Cognitive Status: WNL    Hearing:    WFL    Vision/Perceptual:          Vision  Vision: Within Functional Limits           Strength:    Strength: Generally decreased, functional    Tone & Sensation:   Tone: Normal  Sensation: Impaired (R hand)    Coordination:  Coordination: Within functional limits    Range Of Motion:  AROM: Generally decreased, functional       Functional Mobility:  Bed Mobility:     Bed Mobility Training  Bed Mobility Training: Yes  Overall Level of Assistance: Modified independent  Interventions: Verbal cues  Rolling: Supervision  Scooting: Modified independent  Transfers:     Transfer Training  Transfer Training: Yes  Overall Level of Assistance: Contact guard assistance;Stand by assistance  Interventions: Verbal cues  Sit to Stand: Contact guard assistance  Stand to Sit: Stand by assistance  Toilet Transfer: Contact guard assistance;Stand by assistance  Balance:               Balance  Sitting: Intact  Standing: Intact;With support  Ambulation/Gait Training:                       Gait  Gait Training: Yes  Distance (ft):

## 2025-02-18 NOTE — PROGRESS NOTES
TOTAL KNEE REVISION ARTHROPLASTY DAILY NOTE     ASSESSMENT / PLAN :   Pain Control : Acceptable - Some pain at times, but the patient does not wish to increase their medications  Overnight Issues : none  Wound or incisional issue : Healing incision with no visible drainage  Therapy / Weight Bearing Recommendations : Weight bear as tolerated with use of a walker and two person assist while mobilizing  DVT Prophylaxis : Mechanical and Aspirin and mechanical lower extremity compression device  Disposition : Home - outpatient PT  Medical Concerns : stable  Comments : Discharge home today after cleared by PT     POD  1 Day Post-Op s/p Procedure(s):  RIGHT KNEE TIBIAL REVISION     SUBJECTIVE :     Patient notes the following issues : none.     OBJECTIVE :     Patient Vitals for the past 24 hrs:   BP Temp Temp src Pulse Resp SpO2 Height Weight   02/18/25 0759 113/78 98.2 °F (36.8 °C) Oral 69 16 97 % -- --   02/18/25 0414 -- -- -- -- 20 -- -- --   02/18/25 0344 128/70 97.7 °F (36.5 °C) Oral 55 18 99 % -- --   02/18/25 0308 -- -- -- -- 18 -- -- --   02/18/25 0238 -- -- -- -- 20 -- -- --   02/18/25 0106 129/70 97.5 °F (36.4 °C) Oral 61 -- 99 % -- --   02/17/25 2147 -- -- -- -- 20 -- -- --   02/17/25 2138 126/84 97.7 °F (36.5 °C) Oral 60 -- 97 % -- --   02/17/25 2117 -- -- -- -- 21 -- -- --   02/17/25 2100 124/76 97.9 °F (36.6 °C) Oral 60 21 96 % -- --   02/17/25 2011 137/82 97.9 °F (36.6 °C) Axillary 73 18 94 % -- --   02/17/25 1926 -- -- -- -- 20 -- -- --   02/17/25 1856 -- -- -- -- 16 -- -- --   02/17/25 1844 -- -- -- -- -- -- 1.753 m (5' 9.02\") 91 kg (200 lb 9.9 oz)   02/17/25 1822 132/81 97.5 °F (36.4 °C) Oral 69 17 98 % -- --   02/17/25 1755 125/85 -- -- 65 16 97 % -- --   02/17/25 1745 (!) 122/90 98 °F (36.7 °C) Oral 65 10 96 % -- --   02/17/25 1730 129/82 -- -- 64 11 100 % -- --   02/17/25 1720 121/79 -- -- 66 14 99 % -- --   02/17/25 1715 123/84 -- -- 65 13 99 % -- --   02/17/25 1710 135/79 -- -- 63 10 100 % -- --    02/17/25 1705 132/86 -- -- 66 (!) 9 100 % -- --   02/17/25 1704 119/82 97.6 °F (36.4 °C) Oral 69 14 96 % -- --   02/17/25 1700 124/81 -- -- 70 15 97 % -- --   02/17/25 1655 119/82 -- -- -- -- 96 % -- --   02/17/25 1147 (!) 141/93 98.5 °F (36.9 °C) Oral 68 16 99 % -- 91 kg (200 lb 9.9 oz)            Alert and oriented x3.    Examination of the  right  knee reveals that the dressing is clean, dry and intact.   Motor Exam is intact and normal. Pt is able to perform a straight leg raise.   Sensation is intact to light touch.    No calf pain.     Labs:  Recent Labs     02/18/25  0243   HGB 11.0*   HCT 36.4      K 4.4   *   CO2 28   BUN 12          Patient mobility           MICROBIOLOGY :   Results       Procedure Component Value Units Date/Time    Culture, Anaerobic [[id number]] Collected: 02/17/25 1502    Order Status: No result Updated: 02/17/25 2204    Culture, Wound (with Gram Stain) [[id number]] Collected: 02/17/25 1502    Order Status: Completed Specimen: Surgical Updated: 02/17/25 2302     Special Requests NO SPECIAL REQUESTS        Gram Stain RARE WBCS SEEN         NO ORGANISMS SEEN        Culture PENDING    Culture, Anaerobic [[id number]] Collected: 02/17/25 1451    Order Status: No result Updated: 02/17/25 2203    Culture, Wound (with Gram Stain) [[id number]] Collected: 02/17/25 1451    Order Status: Completed Specimen: Surgical Updated: 02/17/25 2302     Special Requests NO SPECIAL REQUESTS        Gram Stain NO WBC'S SEEN         NO ORGANISMS SEEN        Culture PENDING             Sawyer Montelongo MD  Cell [phone]  Venkatesh Louis PA-C  Cell [phone]  Office : [phone]  Medical Assistant Staff:  Rosalba Powell [phone]  Surgery Scheduler: TRIPP Hammond [phone] ext [phone]

## 2025-02-18 NOTE — PROGRESS NOTES
OCCUPATIONAL THERAPY EVALUATION/DISCHARGE  Patient: Shae Ling (69 y.o. female)  Date: 2/18/2025  Primary Diagnosis: Pain due to total knee replacement, subsequent encounter [T84.84XD, Z96.659]  Mechanical loosening of internal right knee prosthetic joint, subsequent encounter [T84.352D]  Procedure(s) (LRB):  RIGHT KNEE TIBIAL REVISION (Right) 1 Day Post-Op     Precautions: Weight Bearing (WBAT RLE) Right Lower Extremity Weight Bearing: Weight Bearing As Tolerated                ASSESSMENT :  Based on the objective data below, the patient presents with good overall activity tolerance on POD 1 of R knee tibial revision.  Patient lives with her  who is unable to provide assist but has arranged for family/friends to assist PRN at discharge.  Patient demonstrated good understanding of education provided regarding adaptive ADL techniques, safe transfer techniques, energy conservation techniques, and home modifications to ensure safety when returning home.  Patient was received in the chair and performed transfers without LOB or physical assistance needed.  Patient engaged in ADLs with good tolerance.  She has no further skilled OT needs and cleared from OT services at this time.      Functional Outcome Measure:  The patient scored 24/24 on the AM-PAC outcome measure.       Further skilled acute occupational therapy is not indicated at this time.     PLAN :    Recommendation for discharge: (in order for the patient to meet his/her long term goals):   No skilled occupational therapy    Other factors to consider for discharge: no additional factors    IF patient discharges home will need the following DME: none     SUBJECTIVE:   Patient agreeable to OT evaluation.    OBJECTIVE DATA SUMMARY:     Past Medical History:   Diagnosis Date    Adverse effect of anesthesia 01/2024    after knee surgery awoke confused    Anxiety and depression     Arthritis     Colon polyps     COVID     x2    Diabetes mellitus (HCC)      UE Bathing: Independent        LE Bathing: Modified independent        UE Dressing: Independent       LE Dressing: Modified independent        Toileting: Modified independent        [facility]CT[facility] \"6 Clicks\"                                                       Daily Activity Inpatient Short Form  How much help from another person does the patient currently need... Total; A Lot A Little None   1.  Putting on and taking off regular lower body clothing? []  1 []  2 []  3 [x]  4   2.  Bathing (including washing, rinsing, drying)? []  1 []  2 []  3 [x]  4   3.  Toileting, which includes using toilet, bedpan or urinal? [] 1 []  2 []  3 [x]  4   4.  Putting on and taking off regular upper body clothing? []  1 []  2 []  3 [x]  4   5.  Taking care of personal grooming such as brushing teeth? []  1 []  2 []  3 [x]  4   6.  Eating meals? []  1 []  2 []  3 [x]  4   © 2007, Trustees of [facility], under license to "[employer]. All rights reserved     Score: 24/24     Interpretation of Tool:  Represents clinically-significant functional categories (i.e. Activities of daily living).    Cutoff score 39.4 (19) correlates to a good likelihood of discharging home versus a facility  Natalia Tamayo, Ginette Pepe, Srikanth Lewis, Humaira Donahue, Chris Lagos, Santiago Tamayo, -PAC “6-Clicks” Functional Assessment Scores Predict Acute Care Hospital Discharge Destination, Physical Therapy, Volume 94, Issue 9, 1 September 2014, Pages 6482-0834, [email or web address]    Activity Tolerance:   Good    After treatment:   Patient left in no apparent distress sitting up in chair, Call bell within reach, and Bed/ chair alarm activated    COMMUNICATION/EDUCATION:   The patient's plan of care was discussed with: physical therapist, registered nurse, and patient.         Thank you for this referral.  Ginette Desai, OTR/L  Minutes: 40    Occupational Therapy Evaluation Charge Determination    History Examination Decision-Making   LOW Complexity : Brief history review  LOW Complexity: 1-3 Performance deficits relating to physical, cognitive, or psychosocial skills that result in activity limitations and/or participation restrictions LOW Complexity: No comorbidities that affect functional and  no verbal  or physical assist needed to complete eval tasks   Based on the above components, the patient evaluation is determined to be of the following complexity level: Low

## 2025-02-18 NOTE — CARE COORDINATION
Care Management Progress Note      Reason for Admission:   Pain due to total knee replacement, subsequent encounter [T84.84XD, Z96.659]  Mechanical loosening of internal right knee prosthetic joint, subsequent encounter [T84.014D]  Procedure(s) (LRB):  RIGHT KNEE TIBIAL REVISION (Right)  1 Day Post-Op    Patient Admission Status: Inpatient  Date Admitted to INP: 2/17/25 []NA - OBS/Outpatient  RUR: Readmission Risk Score: 4.8    Hospitalization in the last 30 days (Readmission):  No        Transition of care plan:  Patient was discussed in IDR and is medically ready for discharge.     Dispo: return home with .    CM consult received for a RW, CM sent order and referral to [employer] in [employer]. Upon discussing bedside delivery with the patient, patient asked CM to cancel order and she would purchase her own at a store after discharge since her ride home is here and waiting. CM has notified [employer].     Date IM given: 2/17/25.    Outpatient follow-up.    Patient is set with outpatient PT at [facility]. First appointment is 2/21/25 at 1130. Patient states family will take her to and from her therapy appointments.    Discharge transport: sister         02/18/25 1010   Service Assessment   Patient Orientation Alert and Oriented   Cognition Alert   History Provided By Patient   Social/Functional History   Lives With Spouse   Type of Home House   Home Layout Able to Live on Main level with bedroom/bathroom;Two level   Home Access Elevator   Bathroom Shower/Tub Shower chair without back;Shower chair with back   Bathroom Toilet Handicap height   Bathroom Equipment Grab bars in shower   Receives Help From Family   Prior Level of Assist for ADLs Independent   Prior Level of Assist for Homemaking Independent   Homemaking Responsibilities Yes   Prior Level of Assist for Transfers Independent   Active  Yes   Discharge Planning   Type of Residence House   Potential Assistance Needed Other (Comment);Outpatient  PT/OT  (RW was ordered, patient has declined CM assistance and will purchase her own after discharge)   DME Ordered? Walker   Patient expects to be discharged to: House   Services At/After Discharge   Transition of Care Consult (CM Consult) DME/Supply Assistance   [place] Resource Information Provided? No   Confirm Follow Up Transport Family  (sister)           ___________________________________________   Marleny Patel RN Case Manager  2/18/2025   12:42 PM

## 2025-02-18 NOTE — DISCHARGE INSTRUCTIONS
TOTAL KNEE DISCHARGE INSTRUCTIONS        Patient: Shae Ling MRN: [id number]  SSN: [id number]                Please take the time to review the following instructions before you leave the hospital and use them as guidelines during your recovery from surgery.  If you have any questions you may contact my office at [phone]  After business hours or during the weekend you can contact me through [employer] [Preferred] or text / call at [phone] (cell phone) for emergency's. Please use the office number during regular business hours.    SPECIAL INSTRUCTIONS :   1. Full extension at the knee is the most important aspect of your range of motion. Avoid placing a pillow or bump behind the knee. Rather, place the heel up on a bump or pillow and allow gravity to help straighten the knee.   2. You may weight bear as tolerated on the knee and during the day you should bend the knee as much as possible.   3. Drainage from the incision more than 4 days from surgery is concerning. Contact my office if there is any question [phone].   4. You may contact me directly through [employer] if there are specific questions or text / call using my cell number [phone].      DRESSING :     Post-op Dressings : This should be removed by physical therapy or you may remove this yourself 7 days after the date of your surgery. If there is no drainage, then a simple dressing may be used or no dressing at all. Other dressing options can be purchased over the counter at a local pharmacy or medical supply vendor.        A porous adhesive dressing such as pictured above can be purchased online (Amazon) or at your local [facility] or [employer] [facility]. You only need to keep the incision covered for 7 days after showers. A dressing may be used for longer if there are issues with clothing clinging to the incision.         Showering/ Bathing:    You may shower with the Post-op dressing in place. This is left in place for 7  redness or warmth at the area your surgery was performed which isn’t relieved by rest, ice, and elevation.  Oral temperature greater than 101 degrees for 12 hours or more which isn’t relieved by an increase in fluid intake and taking 2 Tylenol every 4-6 hours.  Excessive drainage from your incisions, or drainage which hasn’t stopped by 72 hours after your surgery.  Fever, chills, shortness of breath, chest pain, nausea, vomiting or other signs and symptoms which are of concern to you.        YOUR TOTAL JOINT REPLACEMENT, FREQUENTLY ASKED QUESTIONS  FREQUENTLY ASKED QUESTIONS  FREQUENTLY ASKED QUESTIONS  What should I take for pain?  You will be discharged with four medications for pain (Oxycodone, Tramadol, Ibuprofen and Tylenol). These may vary slightly depending on what you were taking in the hospital.   1st Line - Tylenol Arthritis Strength - 325-650 mg every 4 hours (scheduled for the 1st 24 hours)  2nd Line -  Ibuprofen 400 (2 tabs) - 800 (4 tabs) mg every 8 hours  (scheduled for the 1st 24 hours)  3rd Line - Oxycodone 5 mg (1-2 tablets every 4-6 hrs)  4th Line - Tramadol 50 mg (1-2 tablets every 4-6 hours) - take these between Oxycodone doses if your pain is not alleviated.    When should I call for advice regarding my pain?  If your pain is still uncontrolled after being on the regimen above for at least 12 hours, please call the office [phone] or text / call my cell after hours [phone].  Can I get refills?  Opioid refills are provided for the first 2-6 weeks following surgery.   Use Tylenol 500 mg along with Aleve 220mg twice daily or Motrin 200-800mg every 4-6 hours during the daytime hours after two weeks.  After two weeks, I suggest the opioid pain medications be used only 1 hour prior to your physical therapy appointment and 1 hour before sleeping at night. Use Tylenol and Ibuprofen at other times during the day.   Keep in mind that you will need to discontinue opioids before you  skin glue, which also serves as a watertight seal. If your incision is draining, it is no longer considered to be watertight - you should contact our office prior to showering if you experience any drainage.  Which dressing should I purchase after I remove my Optifoam?  An occlusive dressing which covers your entire incision. This does not have to be waterproof, but will need to be removed when you shower and then replaced.  (Example Only)  How active should I be following surgery?  Progress activities in moderation and at your own pace.   Walking room to room in your house is encouraged.  Walk each day and set progressive goals with small increments (1st week - ?block of walking, 2nd week - 1 block, 3rd week - 2 blocks, etc.)  Will I need help at home?  You will likely need a caretaker who should be available for the first week following surgery.  It is fine for family members to work during the day, as long as they are available by phone.  Planning ahead makes coming home from the hospital a much easier transition.    How long will my surgery take?  On average, total joint replacement takes approximately 1-2 hour.   The entire process, including pre-op and post-op care can last as long as 4- 5 hours before you are transferred to your room.   stroke, pulmonary embolism (a clot going from the legs to the lungs), and even death with surgery.    Will I be given antibiotics? Will I need antibiotics at discharge?  Antibiotics will be given to you both before and after your procedure.  To further minimize the risk of infection, we have streamlined the surgical procedure to take less time in the operating room.    You do not require antibiotics following surgery.      Please do not hesitate to contact me through [employer] or by text / call me at [phone] (cell phone) for questions following surgery - MD Sawyer Tamayo MD  Cell [phone]  Venkatesh Louis PA-C  Cell [phone]  Medical Assistants:

## 2025-02-18 NOTE — PLAN OF CARE
Problem: Physical Therapy - Adult  Goal: By Discharge: Performs mobility at highest level of function for planned discharge setting.  See evaluation for individualized goals.  2/18/2025 1055 by Nan Harmon PT  Outcome: Progressing     Problem: Chronic Conditions and Co-morbidities  Goal: Patient's chronic conditions and co-morbidity symptoms are monitored and maintained or improved  2/18/2025 1142 by Glendy Pierre RN  Outcome: Adequate for Discharge  2/18/2025 1141 by Glendy Pierre RN  Outcome: Progressing  2/18/2025 0408 by Marie Taveras RN  Outcome: Progressing     Problem: ABCDS Injury Assessment  Goal: Absence of physical injury  2/18/2025 1142 by Glendy Pierre RN  Outcome: Adequate for Discharge  2/18/2025 1141 by Glendy Pierre RN  Outcome: Progressing  2/18/2025 0408 by Marie Taveras RN  Outcome: Progressing     Problem: Pain  Goal: Verbalizes/displays adequate comfort level or baseline comfort level  2/18/2025 1142 by Glendy Pierre RN  Outcome: Adequate for Discharge  2/18/2025 1141 by Glendy Pierre RN  Outcome: Progressing  2/18/2025 0408 by Marie Taveras RN  Outcome: Progressing     Problem: Safety - Adult  Goal: Free from fall injury  2/18/2025 1142 by Glendy Pierre RN  Outcome: Adequate for Discharge  2/18/2025 1141 by Glendy Pierre RN  Outcome: Progressing  2/18/2025 0408 by Marie Taveras RN  Outcome: Progressing     Problem: Discharge Planning  Goal: Discharge to home or other facility with appropriate resources  2/18/2025 1142 by Glendy Pierre RN  Outcome: Adequate for Discharge  2/18/2025 1141 by Glendy Pierre RN  Outcome: Progressing  2/18/2025 0408 by Marie Taveras RN  Outcome: Progressing     Problem: ABCDS Injury Assessment  Goal: Absence of physical injury  2/18/2025 1142 by Glendy Pierre RN  Outcome: Adequate for Discharge  2/18/2025 1141 by Gabby  Glendy MEDINA RN  Outcome: Progressing  2/18/2025 0408 by Marie Taveras RN  Outcome: Progressing     Problem: Safety - Adult  Goal: Free from fall injury  2/18/2025 1142 by Glendy Pierre RN  Outcome: Adequate for Discharge  2/18/2025 1141 by Glendy Pierre RN  Outcome: Progressing  2/18/2025 0408 by Marie Taveras RN  Outcome: Progressing     Problem: Pain  Goal: Verbalizes/displays adequate comfort level or baseline comfort level  2/18/2025 1142 by Glendy Pierre RN  Outcome: Adequate for Discharge  2/18/2025 1141 by Glendy Pierre RN  Outcome: Progressing  2/18/2025 0408 by Marie Taveras RN  Outcome: Progressing     Problem: Discharge Planning  Goal: Discharge to home or other facility with appropriate resources  2/18/2025 1142 by Glendy Pierre RN  Outcome: Adequate for Discharge  2/18/2025 1141 by Glendy Pierre RN  Outcome: Progressing  2/18/2025 0408 by Marie Taveras RN  Outcome: Progressing

## 2025-02-18 NOTE — PROGRESS NOTES
842-Informed NP Kelli that pt's BP was 113/78. Do you want me to hold hydroclorothiazide 25 mg?  845-Per NP Kelli, hold pt's hydroclorothiazide

## 2025-02-21 LAB
BACTERIA SPEC CULT: ABNORMAL
BACTERIA SPEC CULT: ABNORMAL
GRAM STN SPEC: ABNORMAL
GRAM STN SPEC: ABNORMAL
SERVICE CMNT-IMP: ABNORMAL

## 2025-02-24 LAB
BACTERIA SPEC CULT: ABNORMAL
GRAM STN SPEC: ABNORMAL
GRAM STN SPEC: ABNORMAL
SERVICE CMNT-IMP: ABNORMAL

## 2025-03-02 NOTE — DISCHARGE SUMMARY
Sushil was cleared by physical therapy and was discharged to Home in stable condition on postoperative day 1.  She was provided with routine postoperative instructions and advised to follow up in my office in 2 weeks following discharge from the hospital.  She was prescribed aspirin for DVT prophylaxis, tramadol and oxycodone for post-operative pain, dulcolax suppository for constipation, and zofran for nausea.    Discharge Medications:  Discharge Medication List as of 2/18/2025 12:34 PM        START taking these medications    Details   traMADol (ULTRAM) 50 MG tablet Take 1 tablet by mouth every 6 hours as needed for Pain for up to 7 days. Max Daily Amount: 200 mg, Disp-20 tablet, R-0Normal      acetaminophen (TYLENOL) 325 MG tablet Take 2 tablets by mouth every 4 hours, Disp-120 tablet, R-1Normal      oxyCODONE (ROXICODONE) 5 MG immediate release tablet Take 1 tablet by mouth every 4 hours as needed for Pain for up to 7 days. Max Daily Amount: 30 mg, Disp-42 tablet, R-0Normal      ondansetron (ZOFRAN-ODT) 4 MG disintegrating tablet Take 1 tablet by mouth every 8 hours as needed for Nausea or Vomiting, Disp-10 tablet, R-0Normal      aspirin (ASPIRIN 81) 81 MG EC tablet Take 1 tablet by mouth 2 times daily, Disp-60 tablet, R-0Normal           CONTINUE these medications which have NOT CHANGED    Details   hydroCHLOROthiazide (HYDRODIURIL) 25 MG tablet TAKE 1 TABLET BY MOUTH DAILY, Disp-90 tablet, R-3ZERO refills remain on this prescription. Your patient is requesting advance approval of refills for this medication to PREVENT ANY MISSED DOSESNormal      Magnesium Hydroxide (MILK OF MAGNESIA PO) Take by mouth as neededHistorical Med      Dulaglutide (TRULICITY) 4.5 MG/0.5ML SOAJ Inject 0.5 mLs into the skin once a week Every Sunday, Disp-6 mL, R-3Normal      latanoprost (XALATAN) 0.005 % ophthalmic solution 1 drop nightlyHistorical Med      dicyclomine (BENTYL) 20 MG tablet Take 1 tablet by mouth every 6 hours as  neededHistorical Med      chlorhexidine (PERIDEX) 0.12 % solution Swish and spit 15 mLs as neededHistorical Med      venlafaxine (EFFEXOR XR) 150 MG extended release capsule Take 1 capsule by mouth daily, Disp-90 capsule, R-1ZERO refills remain on this prescription. Your patient is requesting advance approval of refills for this medication to PREVENT ANY MISSED DOSESNormal      rosuvastatin (CRESTOR) 5 MG tablet Take 1 tablet by mouth daily, Disp-90 tablet, R-3Normal      ibuprofen (ADVIL;MOTRIN) 800 MG tablet Take 1 tablet by mouth every 8 hours as needed for Pain, Disp-60 tablet, R-0Normal      timolol (TIMOPTIC-XE) 0.5 % ophthalmic gel-forming INSTILL 1 DROP IN BOTH EYES EVERY DAY IN THE MORNING, Disp-3 each, R-3Normal      PSYLLIUM FIBER PO Take 1 packet by mouth as neededHistorical Med      Cholecalciferol 50 MCG (2000 UT) CAPS Take 1 capsule by mouth every morningHistorical Med      fluticasone (FLONASE) 50 MCG/ACT nasal spray 2 sprays by Nasal route as neededHistorical Med             Signed by: Sawyer Montelongo MD  3/2/2025

## 2025-03-03 LAB
BACTERIA SPEC CULT: ABNORMAL
GRAM STN SPEC: ABNORMAL
SERVICE CMNT-IMP: ABNORMAL

## 2025-06-30 ENCOUNTER — TELEPHONE (OUTPATIENT)
Age: 70
End: 2025-06-30

## 2025-06-30 ENCOUNTER — OFFICE VISIT (OUTPATIENT)
Age: 70
End: 2025-06-30
Payer: MEDICARE

## 2025-06-30 VITALS
HEIGHT: 69 IN | WEIGHT: 204.8 LBS | SYSTOLIC BLOOD PRESSURE: 121 MMHG | DIASTOLIC BLOOD PRESSURE: 80 MMHG | HEART RATE: 77 BPM | OXYGEN SATURATION: 96 % | BODY MASS INDEX: 30.33 KG/M2 | RESPIRATION RATE: 15 BRPM | TEMPERATURE: 98.7 F

## 2025-06-30 DIAGNOSIS — I10 ESSENTIAL (PRIMARY) HYPERTENSION: Primary | ICD-10-CM

## 2025-06-30 PROCEDURE — 3017F COLORECTAL CA SCREEN DOC REV: CPT | Performed by: FAMILY MEDICINE

## 2025-06-30 PROCEDURE — G8427 DOCREV CUR MEDS BY ELIG CLIN: HCPCS | Performed by: FAMILY MEDICINE

## 2025-06-30 PROCEDURE — 1159F MED LIST DOCD IN RCRD: CPT | Performed by: FAMILY MEDICINE

## 2025-06-30 PROCEDURE — 1126F AMNT PAIN NOTED NONE PRSNT: CPT | Performed by: FAMILY MEDICINE

## 2025-06-30 PROCEDURE — 99213 OFFICE O/P EST LOW 20 MIN: CPT

## 2025-06-30 PROCEDURE — 3079F DIAST BP 80-89 MM HG: CPT | Performed by: FAMILY MEDICINE

## 2025-06-30 PROCEDURE — 3074F SYST BP LT 130 MM HG: CPT | Performed by: FAMILY MEDICINE

## 2025-06-30 PROCEDURE — 1123F ACP DISCUSS/DSCN MKR DOCD: CPT | Performed by: FAMILY MEDICINE

## 2025-06-30 PROCEDURE — G8399 PT W/DXA RESULTS DOCUMENT: HCPCS | Performed by: FAMILY MEDICINE

## 2025-06-30 PROCEDURE — 1090F PRES/ABSN URINE INCON ASSESS: CPT | Performed by: FAMILY MEDICINE

## 2025-06-30 PROCEDURE — 1036F TOBACCO NON-USER: CPT | Performed by: FAMILY MEDICINE

## 2025-06-30 PROCEDURE — G8417 CALC BMI ABV UP PARAM F/U: HCPCS | Performed by: FAMILY MEDICINE

## 2025-06-30 NOTE — TELEPHONE ENCOUNTER
[facility] Transfer: YES    Spoke with Shaenigel Ling (self) who identified self with two patient identifiers (name and ).  On Release of Information Form? N/A, self    Patient Active Problem List   Diagnosis    Hypertensive disorder    Colon polyps    Hypercholesterolemia    Glaucoma    Pineal gland cyst    Moderate episode of recurrent major depressive disorder (HCC)    Calcaneal spur    Sedative, hypnotic or anxiolytic dependence with sedative, hypnotic or anxiolytic-induced mood disorder (HCC)    Otalgia, right    Type 2 diabetes mellitus    Hyperparathyroidism    Primary open angle glaucoma (POAG) of both eyes, moderate stage    Right knee pain    Mechanical loosening of internal right knee prosthetic joint, subsequent encounter       Past Surgical History:   Procedure Laterality Date    ABDOMINOPLASTY      CARPAL TUNNEL RELEASE Right     CATARACT REMOVAL Left     COLONOSCOPY      due again in     ENDOSCOPY, COLON, DIAGNOSTIC      dilation of strictures    EXCISION/BIOPSY      foot mass    HYSTERECTOMY, TOTAL ABDOMINAL (CERVIX REMOVED)      with BSO    MOUTH SURGERY      oral surgery -- dental implants    NASAL SURG PROC UNLISTED Bilateral 2024    SEPTOPLASTY WITH COBLATION BILATERAL TURBINATES performed by Greg Seo MD at [facility] MAIN OR    RETINAL DETACHMENT SURGERY Left     REVISION TOTAL KNEE ARTHROPLASTY Right 2025    RIGHT KNEE TIBIAL REVISION performed by Sawyer Montelongo MD at [facility] AMBULATORY OR    ROTATOR CUFF REPAIR Right     with biceps tendon repair    TOTAL KNEE ARTHROPLASTY Right 01/15/2024    RIGHT TOTAL KNEE ARTHROPLASTY- RUPERT (FAST TRAK) performed by Sawyer Montelongo MD at [facility] AMBULATORY OR    UPPER GASTROINTESTINAL ENDOSCOPY         Allergies:  Allergies   Allergen Reactions    Shrimp Extract Itching and Swelling     Per patient tolerates IV dye    Adhesive Tape Rash     Developed rash and hyperpigmentation from tegaderm    Lisinopril Itching and Rash     Other Reaction(s): RASH,

## 2025-06-30 NOTE — PROGRESS NOTES
Shae Ling is a 70 y.o. female    Chief Complaint   Patient presents with    Blood Pressure Check       \"Have you been to the ER, urgent care clinic since your last visit?  Hospitalized since your last visit?\"    NO    “Have you seen or consulted any other health care providers outside of [facility] since your last visit?”    NO              Vitals:    06/30/25 1840   BP: 121/80   Pulse: 77   Resp: 15   Temp: 98.7 °F (37.1 °C)   SpO2: 96%          No data to display              Health Maintenance Due   Topic Date Due    Diabetic retinal exam  Never done    Pneumococcal 50+ years Vaccine (2 of 2 - PCV) 09/02/2022    Annual Wellness Visit (Medicare)  10/30/2024    COVID-19 Vaccine (11 - 2024-25 season) 11/23/2024    Diabetic foot exam  07/24/2025    Diabetic Alb to Cr ratio (uACR) test  07/24/2025

## 2025-06-30 NOTE — PROGRESS NOTES
[address] [place] [address]    Office [phone], Fax [phone]      Subjective   Shae Ling is a 70 y.o. female who presents for   Chief Complaint   Patient presents with    Blood Pressure Check     #BP check  - Today BP in office 121/80  - At home, had elevated BP up 140 systolic x1, but mostly in low 120s-130s. and HA  - HCTZ 25mg daily, stopped for weeks but restarted on Saturday.  - Drinks daily wine or cocktail. Stopped alcohol since Friday.   - Stress was contributory to BP. Going through custody issues with grandchildren, helping with grandchildren, main caregiver for  with disability.   - Was seeing counselor, but stopped once they retired. Will return to the same place to get new therapist.    Review of Systems   Per HPI. All other systems reviewed and are negative.    Health Maintenance:  Health Maintenance Due   Topic Date Due    Diabetic retinal exam  Never done    Pneumococcal 50+ years Vaccine (2 of 2 - PCV) 09/02/2022    Annual Wellness Visit (Medicare)  10/30/2024    COVID-19 Vaccine (11 - 2024-25 season) 11/23/2024    Diabetic foot exam  07/24/2025    Diabetic Alb to Cr ratio (uACR) test  07/24/2025        Medical History  Past Medical History:   Diagnosis Date    Adverse effect of anesthesia 01/2024    after knee surgery awoke confused    Anxiety and depression     Arthritis     Colon polyps     COVID     x2    Diabetes mellitus (HCC) 04/30/2024    Diverticular disease     Esophageal stricture     Glaucoma     Hearing loss 2000    occupational    Hemorrhoids     Hypercholesterolemia     Hyperparathyroidism     Hypertension     Mitral valve prolapse     Neuropathy     Pineal gland cyst     2 cm    Sleep apnea     improved since weight loss; does not use CPAP    Tinnitus     not a problem since spring 2024       Medications  Current Outpatient Medications   Medication Sig    aspirin (ASPIRIN 81) 81 MG EC tablet Take 1 tablet by mouth 2 times daily

## 2025-07-02 ENCOUNTER — OFFICE VISIT (OUTPATIENT)
Age: 70
End: 2025-07-02
Payer: MEDICARE

## 2025-07-02 VITALS
HEIGHT: 69 IN | WEIGHT: 205.2 LBS | OXYGEN SATURATION: 97 % | DIASTOLIC BLOOD PRESSURE: 73 MMHG | HEART RATE: 64 BPM | RESPIRATION RATE: 18 BRPM | BODY MASS INDEX: 30.39 KG/M2 | TEMPERATURE: 97.6 F | SYSTOLIC BLOOD PRESSURE: 111 MMHG

## 2025-07-02 DIAGNOSIS — I10 ESSENTIAL (PRIMARY) HYPERTENSION: ICD-10-CM

## 2025-07-02 DIAGNOSIS — F33.1 MODERATE EPISODE OF RECURRENT MAJOR DEPRESSIVE DISORDER (HCC): ICD-10-CM

## 2025-07-02 DIAGNOSIS — L74.9 SWEATING ABNORMALITY: ICD-10-CM

## 2025-07-02 DIAGNOSIS — E11.9 TYPE 2 DIABETES MELLITUS WITHOUT COMPLICATION, WITHOUT LONG-TERM CURRENT USE OF INSULIN (HCC): ICD-10-CM

## 2025-07-02 DIAGNOSIS — Z00.00 MEDICARE ANNUAL WELLNESS VISIT, SUBSEQUENT: Primary | ICD-10-CM

## 2025-07-02 DIAGNOSIS — R35.0 FREQUENCY OF MICTURITION: ICD-10-CM

## 2025-07-02 PROBLEM — Z80.9 FAMILY HISTORY OF MALIGNANT NEOPLASM: Status: ACTIVE | Noted: 2025-02-28

## 2025-07-02 PROBLEM — T84.032A MECHANICAL LOOSENING OF INTERNAL RIGHT KNEE PROSTHETIC JOINT: Status: ACTIVE | Noted: 2025-02-17

## 2025-07-02 PROBLEM — M25.561 RIGHT KNEE PAIN: Status: RESOLVED | Noted: 2025-02-17 | Resolved: 2025-07-02

## 2025-07-02 PROBLEM — H92.01 OTALGIA, RIGHT: Status: RESOLVED | Noted: 2024-04-17 | Resolved: 2025-07-02

## 2025-07-02 PROCEDURE — G8417 CALC BMI ABV UP PARAM F/U: HCPCS | Performed by: FAMILY MEDICINE

## 2025-07-02 PROCEDURE — 3074F SYST BP LT 130 MM HG: CPT | Performed by: FAMILY MEDICINE

## 2025-07-02 PROCEDURE — 3044F HG A1C LEVEL LT 7.0%: CPT | Performed by: FAMILY MEDICINE

## 2025-07-02 PROCEDURE — 1123F ACP DISCUSS/DSCN MKR DOCD: CPT | Performed by: FAMILY MEDICINE

## 2025-07-02 PROCEDURE — 3078F DIAST BP <80 MM HG: CPT | Performed by: FAMILY MEDICINE

## 2025-07-02 PROCEDURE — 99214 OFFICE O/P EST MOD 30 MIN: CPT | Performed by: FAMILY MEDICINE

## 2025-07-02 PROCEDURE — G8427 DOCREV CUR MEDS BY ELIG CLIN: HCPCS | Performed by: FAMILY MEDICINE

## 2025-07-02 PROCEDURE — 2022F DILAT RTA XM EVC RTNOPTHY: CPT | Performed by: FAMILY MEDICINE

## 2025-07-02 PROCEDURE — 3017F COLORECTAL CA SCREEN DOC REV: CPT | Performed by: FAMILY MEDICINE

## 2025-07-02 PROCEDURE — 1090F PRES/ABSN URINE INCON ASSESS: CPT | Performed by: FAMILY MEDICINE

## 2025-07-02 PROCEDURE — 1159F MED LIST DOCD IN RCRD: CPT | Performed by: FAMILY MEDICINE

## 2025-07-02 PROCEDURE — G8399 PT W/DXA RESULTS DOCUMENT: HCPCS | Performed by: FAMILY MEDICINE

## 2025-07-02 PROCEDURE — 1126F AMNT PAIN NOTED NONE PRSNT: CPT | Performed by: FAMILY MEDICINE

## 2025-07-02 PROCEDURE — G0439 PPPS, SUBSEQ VISIT: HCPCS | Performed by: FAMILY MEDICINE

## 2025-07-02 PROCEDURE — 1036F TOBACCO NON-USER: CPT | Performed by: FAMILY MEDICINE

## 2025-07-02 RX ORDER — LATANOPROST 50 UG/ML
SOLUTION/ DROPS OPHTHALMIC
COMMUNITY
Start: 2025-07-02

## 2025-07-02 ASSESSMENT — PATIENT HEALTH QUESTIONNAIRE - PHQ9
SUM OF ALL RESPONSES TO PHQ QUESTIONS 1-9: 0
SUM OF ALL RESPONSES TO PHQ QUESTIONS 1-9: 0
2. FEELING DOWN, DEPRESSED OR HOPELESS: NOT AT ALL
SUM OF ALL RESPONSES TO PHQ QUESTIONS 1-9: 0
SUM OF ALL RESPONSES TO PHQ QUESTIONS 1-9: 0
1. LITTLE INTEREST OR PLEASURE IN DOING THINGS: NOT AT ALL

## 2025-07-02 ASSESSMENT — LIFESTYLE VARIABLES
HOW OFTEN DO YOU HAVE A DRINK CONTAINING ALCOHOL: MONTHLY OR LESS
HOW MANY STANDARD DRINKS CONTAINING ALCOHOL DO YOU HAVE ON A TYPICAL DAY: 1 OR 2

## 2025-07-02 NOTE — PROGRESS NOTES
Medicare Annual Wellness Visit    Shae Ling is here for Medicare AWV (- high/low BP readings over the weekend )    Assessment & Plan  Hypertension  Blood pressure is well controlled today at 111/73 mmHg. Recent episode of elevated blood pressure (143/107 mmHg) with headaches, dizziness, and lightheadedness.  Treatment plan: Continue current regimen of HCTZ, maintain reduced salt intake, and regular exercise. Avoid alcohol due to its impact on blood pressure and bladder issues.    Diabetes Mellitus  Diabetes well controlled on Trulicity, last A1c 5.6% in 02/2025.  Work up: Repeat BMP and A1c today for routine diabetes maintenance.  Treatment plan: Reduce Trulicity dosage from 4.5 mg to 3 mg, monitor blood glucose levels.    Alcohol Dependence  Alcohol identified as contributing to symptoms, decided to stop drinking.  Treatment plan: Consider support groups like AA if needed. Monitor for withdrawal symptoms and report if they occur.    Urinary Incontinence  Improvement in urinary urgency since stopping alcohol.  Treatment plan: Monitor symptoms and report changes. Maintain hydration and bladder health practices.    Nausea  Nausea improved with light beer.  Treatment plan: Monitor symptoms and report changes. Discussed potential triggers and lifestyle modifications. Avoid alcohol and maintain a balanced diet.    Joint Pain  Joint pain improved since reducing alcohol intake.  Treatment plan: Use Tylenol for pain management as needed. Maintain regular physical activity and joint health practices. Report persistent or worsening pain.    Depression  On venlafaxine, satisfied with medication.  Treatment plan: Continue current dose, seek counseling if needed. Discussed importance of mental health maintenance and support systems. Monitor for changes in mood or symptoms.    Cough  Coughing up phlegm, possibly due to pollen.  Treatment plan: Use nasal spray daily, report worsening or changes in cough. Discussed environmental

## 2025-07-02 NOTE — ACP (ADVANCE CARE PLANNING)
Advance Care Planning     Advance Care Planning (ACP) Physician/NP/PA Conversation    Date of Conversation: 7/2/2025  Conducted with: Patient with Decision Making Capacity    Healthcare Decision Maker:      Primary Decision Maker: MIKE CASTORENA - Child - [phone]    Secondary Decision Maker: BLAIR HERNANDEZ - Brother/Sister - [phone]    Click here to complete Healthcare Decision Makers including selection of the Healthcare Decision Maker Relationship (ie \"Primary\")  Today we discussed Healthcare Decision Makers. The patient is considering options.    Care Preferences:    Hospitalization:  \"If your health worsens and it becomes clear that your chance of recovery is unlikely, what would be your preference regarding hospitalization?\"  The patient would prefer hospitalization.    Ventilation:  \"If you were unable to breath on your own and your chance of recovery was unlikely, what would be your preference about the use of a ventilator (breathing machine) if it was available to you?\"  The patient would desire the use of a ventilator.    Resuscitation:  \"In the event your heart stopped as a result of an underlying serious health condition, would you want attempts made to restart your heart, or would you prefer a natural death?\"  Yes, attempt to resuscitate.    treatment goals, ventilation preferences, and resuscitation preferences    Conversation Outcomes / Follow-Up Plan:  ACP in process - completing/providing documents  Reviewed DNR/DNI and patient elects Full Code (Attempt Resuscitation)    Length of Voluntary ACP Conversation in minutes:  <16 minutes (Non-Billable)    Dulce Coto MD

## 2025-07-02 NOTE — PATIENT INSTRUCTIONS

## 2025-07-02 NOTE — PROGRESS NOTES
Shae Ling is a 70 y.o. female      Chief Complaint   Patient presents with    Medicare AWV     - high/low BP readings over the weekend        \"Have you been to the ER, urgent care clinic since your last visit?  Hospitalized since your last visit?\"    NO    “Have you seen or consulted any other health care providers outside of [facility] since your last visit?”    NO              Vitals:    07/02/25 0919   BP: 111/73   BP Site: Right Upper Arm   Patient Position: Sitting   BP Cuff Size: Medium Adult   Pulse: 64   Resp: 18   Temp: 97.6 °F (36.4 °C)   TempSrc: Temporal   SpO2: 97%   Weight: 93.1 kg (205 lb 3.2 oz)   Height: 1.753 m (5' 9.02\")            Health Maintenance Due   Topic Date Due    Diabetic retinal exam  Never done    Pneumococcal 50+ years Vaccine (2 of 2 - PCV) 09/02/2022    Annual Wellness Visit (Medicare)  10/30/2024    COVID-19 Vaccine (11 - 2024-25 season) 11/23/2024    Diabetic foot exam  07/24/2025    Diabetic Alb to Cr ratio (uACR) test  07/24/2025         Medication Reconciliation completed, changes noted.  Please  Update medication list.

## 2025-07-03 LAB
CREAT UR-MCNC: 117 MG/DL
MICROALBUMIN UR-MCNC: 0.83 MG/DL
MICROALBUMIN/CREAT UR-RTO: 7 MG/G (ref 0–30)

## 2025-07-04 LAB
ANION GAP SERPL CALC-SCNC: 6 MMOL/L (ref 2–12)
BUN SERPL-MCNC: 12 MG/DL (ref 6–20)
BUN/CREAT SERPL: 13 (ref 12–20)
CALCIUM SERPL-MCNC: 10.2 MG/DL (ref 8.5–10.1)
CHLORIDE SERPL-SCNC: 102 MMOL/L (ref 97–108)
CO2 SERPL-SCNC: 29 MMOL/L (ref 21–32)
CREAT SERPL-MCNC: 0.96 MG/DL (ref 0.55–1.02)
EST. AVERAGE GLUCOSE BLD GHB EST-MCNC: 114 MG/DL
GLUCOSE SERPL-MCNC: 84 MG/DL (ref 65–100)
HBA1C MFR BLD: 5.6 % (ref 4–5.6)
POTASSIUM SERPL-SCNC: 3.7 MMOL/L (ref 3.5–5.1)
SODIUM SERPL-SCNC: 137 MMOL/L (ref 136–145)
TSH SERPL DL<=0.05 MIU/L-ACNC: 1.87 UIU/ML (ref 0.36–3.74)

## 2025-07-07 ENCOUNTER — RESULTS FOLLOW-UP (OUTPATIENT)
Age: 70
End: 2025-07-07

## 2025-07-31 ENCOUNTER — OFFICE VISIT (OUTPATIENT)
Age: 70
End: 2025-07-31

## 2025-07-31 VITALS
OXYGEN SATURATION: 96 % | BODY MASS INDEX: 30.36 KG/M2 | DIASTOLIC BLOOD PRESSURE: 70 MMHG | SYSTOLIC BLOOD PRESSURE: 108 MMHG | HEIGHT: 69 IN | RESPIRATION RATE: 16 BRPM | TEMPERATURE: 98.4 F | HEART RATE: 72 BPM | WEIGHT: 205 LBS

## 2025-07-31 DIAGNOSIS — J40 BRONCHITIS: Primary | ICD-10-CM

## 2025-07-31 RX ORDER — AZITHROMYCIN 250 MG/1
TABLET, FILM COATED ORAL
Qty: 6 TABLET | Refills: 0 | Status: SHIPPED | OUTPATIENT
Start: 2025-07-31

## 2025-07-31 RX ORDER — PREDNISONE 20 MG/1
TABLET ORAL
Qty: 18 TABLET | Refills: 0 | Status: SHIPPED | OUTPATIENT
Start: 2025-07-31

## 2025-07-31 ASSESSMENT — ENCOUNTER SYMPTOMS
RHINORRHEA: 1
SORE THROAT: 1
COUGH: 1
WHEEZING: 0
SINUS PRESSURE: 1
TROUBLE SWALLOWING: 1
SHORTNESS OF BREATH: 0
CHEST TIGHTNESS: 0

## 2025-07-31 NOTE — PROGRESS NOTES
SURGERY      oral surgery -- dental implants    NASAL SURG PROC UNLISTED Bilateral 05/02/2024    SEPTOPLASTY WITH COBLATION BILATERAL TURBINATES performed by Greg Seo MD at [facility] MAIN OR    RETINAL DETACHMENT SURGERY Left     REVISION TOTAL KNEE ARTHROPLASTY Right 2/17/2025    RIGHT KNEE TIBIAL REVISION performed by Sawyer Montelongo MD at [facility] AMBULATORY OR    ROTATOR CUFF REPAIR Right     with biceps tendon repair    TOTAL KNEE ARTHROPLASTY Right 01/15/2024    RIGHT TOTAL KNEE ARTHROPLASTY- RUPERT (FAST TRAK) performed by Sawyer Montelongo MD at [facility] AMBULATORY OR    UPPER GASTROINTESTINAL ENDOSCOPY          Social History:   Social Connections: Not on file        Patient Care Team:  Dulce Coto MD as PCP - General  Dulce Coto MD as PCP - Empaneled Provider    Patient Active Problem List   Diagnosis    Essential (primary) hypertension    Colon polyps    Hypercholesterolemia    Glaucoma    Pineal gland cyst    Moderate episode of recurrent major depressive disorder (HCC)    Calcaneal spur    Sedative, hypnotic or anxiolytic dependence with sedative, hypnotic or anxiolytic-induced mood disorder (HCC)    Type 2 diabetes mellitus    Hyperparathyroidism    Primary open angle glaucoma (POAG) of both eyes, moderate stage    Mechanical loosening of internal right knee prosthetic joint    Family history of malignant neoplasm            I ADVISED PATIENT TO GO TO ER IF SYMPTOMS WORSEN , CHANGE OR FAILS TO IMPROVE.    I have discussed the diagnosis with the patient and the intended plan as seen in the above orders.  The patient has received an after-visit summary and questions were answered concerning future plans.  I have discussed medication side effects and warnings with the patient as well. The patient agrees and understands above plan.       An electronic signature was used to authenticate this note.  -- Obdulia Ramos PA-C

## 2025-08-01 ENCOUNTER — TELEPHONE (OUTPATIENT)
Age: 70
End: 2025-08-01

## 2025-08-01 RX ORDER — FLUCONAZOLE 150 MG/1
150 TABLET ORAL SEE ADMIN INSTRUCTIONS
Qty: 2 TABLET | Refills: 0 | Status: SHIPPED | OUTPATIENT
Start: 2025-08-01

## 2025-08-14 ENCOUNTER — OFFICE VISIT (OUTPATIENT)
Age: 70
End: 2025-08-14

## 2025-08-14 VITALS
RESPIRATION RATE: 18 BRPM | HEART RATE: 70 BPM | HEIGHT: 71 IN | SYSTOLIC BLOOD PRESSURE: 115 MMHG | WEIGHT: 207.8 LBS | OXYGEN SATURATION: 97 % | BODY MASS INDEX: 29.09 KG/M2 | DIASTOLIC BLOOD PRESSURE: 78 MMHG | TEMPERATURE: 98.5 F

## 2025-08-14 DIAGNOSIS — R03.0 ELEVATED BLOOD PRESSURE READING: ICD-10-CM

## 2025-08-14 DIAGNOSIS — J01.10 ACUTE NON-RECURRENT FRONTAL SINUSITIS: Primary | ICD-10-CM

## 2025-08-14 RX ORDER — BROMPHENIRAMINE MALEATE, PSEUDOEPHEDRINE HYDROCHLORIDE, AND DEXTROMETHORPHAN HYDROBROMIDE 2; 30; 10 MG/5ML; MG/5ML; MG/5ML
7.5 SYRUP ORAL 4 TIMES DAILY PRN
Qty: 180 ML | Refills: 0 | Status: SHIPPED | OUTPATIENT
Start: 2025-08-14

## 2025-08-14 RX ORDER — DOXYCYCLINE HYCLATE 100 MG
100 TABLET ORAL 2 TIMES DAILY
Qty: 20 TABLET | Refills: 0 | Status: SHIPPED | OUTPATIENT
Start: 2025-08-14 | End: 2025-08-24

## 2025-08-14 RX ORDER — FLUCONAZOLE 150 MG/1
150 TABLET ORAL ONCE
Qty: 1 TABLET | Refills: 1 | Status: SHIPPED | OUTPATIENT
Start: 2025-08-14 | End: 2025-08-14

## 2025-08-14 ASSESSMENT — ENCOUNTER SYMPTOMS
WHEEZING: 0
SORE THROAT: 1
NAUSEA: 0
SINUS COMPLAINT: 1
SWOLLEN GLANDS: 0
COUGH: 1
SINUS PAIN: 1
DIARRHEA: 0
VOMITING: 0
RHINORRHEA: 1

## 2025-09-03 ENCOUNTER — OFFICE VISIT (OUTPATIENT)
Age: 70
End: 2025-09-03
Payer: MEDICARE

## 2025-09-03 VITALS
WEIGHT: 208.4 LBS | OXYGEN SATURATION: 96 % | RESPIRATION RATE: 18 BRPM | HEART RATE: 73 BPM | BODY MASS INDEX: 29.18 KG/M2 | TEMPERATURE: 98.7 F | SYSTOLIC BLOOD PRESSURE: 118 MMHG | DIASTOLIC BLOOD PRESSURE: 64 MMHG | HEIGHT: 71 IN

## 2025-09-03 DIAGNOSIS — R42 LIGHT HEADEDNESS: ICD-10-CM

## 2025-09-03 DIAGNOSIS — R05.1 ACUTE COUGH: Primary | ICD-10-CM

## 2025-09-03 DIAGNOSIS — E11.9 TYPE 2 DIABETES MELLITUS WITHOUT COMPLICATION, WITHOUT LONG-TERM CURRENT USE OF INSULIN (HCC): ICD-10-CM

## 2025-09-03 PROCEDURE — 2022F DILAT RTA XM EVC RTNOPTHY: CPT

## 2025-09-03 PROCEDURE — G8427 DOCREV CUR MEDS BY ELIG CLIN: HCPCS

## 2025-09-03 PROCEDURE — 3074F SYST BP LT 130 MM HG: CPT

## 2025-09-03 PROCEDURE — G8399 PT W/DXA RESULTS DOCUMENT: HCPCS

## 2025-09-03 PROCEDURE — 3078F DIAST BP <80 MM HG: CPT

## 2025-09-03 PROCEDURE — 99213 OFFICE O/P EST LOW 20 MIN: CPT

## 2025-09-03 PROCEDURE — 1123F ACP DISCUSS/DSCN MKR DOCD: CPT

## 2025-09-03 PROCEDURE — 3044F HG A1C LEVEL LT 7.0%: CPT

## 2025-09-03 PROCEDURE — 3017F COLORECTAL CA SCREEN DOC REV: CPT

## 2025-09-03 PROCEDURE — 1036F TOBACCO NON-USER: CPT

## 2025-09-03 PROCEDURE — 1126F AMNT PAIN NOTED NONE PRSNT: CPT

## 2025-09-03 PROCEDURE — 1090F PRES/ABSN URINE INCON ASSESS: CPT

## 2025-09-03 PROCEDURE — 1159F MED LIST DOCD IN RCRD: CPT

## 2025-09-03 PROCEDURE — G8417 CALC BMI ABV UP PARAM F/U: HCPCS

## 2025-09-03 RX ORDER — VITAMIN B COMPLEX
1 CAPSULE ORAL DAILY
COMMUNITY

## 2025-09-03 ASSESSMENT — PATIENT HEALTH QUESTIONNAIRE - PHQ9
SUM OF ALL RESPONSES TO PHQ QUESTIONS 1-9: 0
1. LITTLE INTEREST OR PLEASURE IN DOING THINGS: NOT AT ALL
SUM OF ALL RESPONSES TO PHQ QUESTIONS 1-9: 0
2. FEELING DOWN, DEPRESSED OR HOPELESS: NOT AT ALL

## 2025-09-03 ASSESSMENT — ENCOUNTER SYMPTOMS
SHORTNESS OF BREATH: 0
SORE THROAT: 0
COUGH: 1

## (undated) DEVICE — PENCIL SMK EVAC ALL IN 1 DSGN ENH VISIBILITY IMPROVED AIR

## (undated) DEVICE — SOL IRR SOD CHL 0.9% TITAN XL CNTNR 3000ML

## (undated) DEVICE — SOUTHSIDE TURNOVER: Brand: MEDLINE INDUSTRIES, INC.

## (undated) DEVICE — SPONGE,NEURO,0.5"X3",XR,STRL,LF,10/PK: Brand: MEDLINE

## (undated) DEVICE — GLOVE SURG SZ 9 L12IN FNGR THK79MIL GRN LTX FREE

## (undated) DEVICE — 4-PORT MANIFOLD: Brand: NEPTUNE 2

## (undated) DEVICE — BLADE,CARBON-STEEL,15,STRL,DISPOSABLE,TB: Brand: MEDLINE

## (undated) DEVICE — Device: Brand: JELCO

## (undated) DEVICE — ELECTRODE BLDE L4IN NONINSULATED EDGE

## (undated) DEVICE — ELECTRODE PT RET AD L9FT HI MOIST COND ADH HYDRGEL CORDED

## (undated) DEVICE — STRYKER PERFORMANCE SERIES SAGITTAL BLADE: Brand: STRYKER PERFORMANCE SERIES

## (undated) DEVICE — SYRINGE MED 10ML LUERLOCK TIP W/O SFTY DISP

## (undated) DEVICE — BASIC SINGLE BASIN-LF: Brand: MEDLINE INDUSTRIES, INC.

## (undated) DEVICE — SUTURE VCRL + SZ 1-0 L36IN ABSRB UD CTX 1/2 CIR TAPR PNT VCP977H

## (undated) DEVICE — HOOD: Brand: FLYTE

## (undated) DEVICE — SUTURE STRATAFIX SYMMETRIC SZ 1 L18IN ABSRB VLT CT1 L36CM SXPP1A404

## (undated) DEVICE — BLUNTFILL: Brand: MONOJECT

## (undated) DEVICE — TOTAL JOINT-SFMC: Brand: MEDLINE INDUSTRIES, INC.

## (undated) DEVICE — SOLUTION WND IRRIGATION 450 ML 0.5 PVP-I 0.9 NACL

## (undated) DEVICE — DUAL IRRIGATION ADAPTOR

## (undated) DEVICE — GLOVE SURG SZ 85 L12IN FNGR ORTHO 126MIL CRM LTX FREE

## (undated) DEVICE — SUTURE MONOCRYL STRATAFIX SPRL + 3 0 SGL ARMED PS 1 24 IN LEN SXMP1B103

## (undated) DEVICE — KIT TRK KNEE PROC VIZADISC

## (undated) DEVICE — SUTURE VICRYL + SZ 2-0 L36IN ABSRB UD L36MM CT-1 1/2 CIR VCP945H

## (undated) DEVICE — RUBBERBAND FASTENING W0.25XL3.5IN 5 PER PK

## (undated) DEVICE — SHEET, DRAPE, SPLIT, STERILE: Brand: MEDLINE

## (undated) DEVICE — PREP IM ENCHANCED TOTAL HIP BONE                                    PREPARATION KIT: Brand: PREP-IM

## (undated) DEVICE — SYRINGE MED 30ML STD CLR PLAS LUERLOCK TIP N CTRL DISP

## (undated) DEVICE — SUTURE STRATAFIX SPRL MCRYL + 3 0 SGL ARMED PS 1 24 IN LEN SXMP1B103

## (undated) DEVICE — 1010 S-DRAPE TOWEL DRAPE 10/BX: Brand: STERI-DRAPE™

## (undated) DEVICE — GLOVE SURG SZ 85 L12IN FNGR THK79MIL GRN LTX FREE

## (undated) DEVICE — SPONGE GZ W4XL4IN COT 12 PLY TYP VII WVN C FLD DSGN STERILE

## (undated) DEVICE — SOLUTION IRRIG 1000ML STRL H2O USP PLAS POUR BTL

## (undated) DEVICE — DRAPE,REIN 53X77,STERILE: Brand: MEDLINE

## (undated) DEVICE — TAPE,CLOTH/SILK,CURAD,3"X10YD,LF,40/CS: Brand: CURAD

## (undated) DEVICE — COVER,MAYO STAND,STERILE: Brand: MEDLINE

## (undated) DEVICE — Device

## (undated) DEVICE — TOWEL SURG W17XL27IN STD BLU COT NONFENESTRATED PREWASHED

## (undated) DEVICE — CONTAINER,SPECIMEN,3OZ,OR STRL: Brand: MEDLINE

## (undated) DEVICE — BLADE SAW W9XL25MM THK051MM CUT THK074MM REPL S STL SAG

## (undated) DEVICE — SYRINGE IRRIG 60ML SFT PLIABLE BLB EZ TO GRP 1 HND USE W/

## (undated) DEVICE — SYRINGE MED 50ML LUERLOCK TIP

## (undated) DEVICE — SUTURE VCRL SZ 2-0 L36IN ABSRB UD L36MM CT-1 1/2 CIR J945H

## (undated) DEVICE — GARMENT,MEDLINE,DVT,INT,CALF,MED, GEN2: Brand: MEDLINE

## (undated) DEVICE — DRAPE,U/ SHT,SPLIT,PLAS,STERIL: Brand: MEDLINE

## (undated) DEVICE — SOLUTION IV 250ML 0.9% SOD CHL PH 5 INJ USP VIAFLX PLAS

## (undated) DEVICE — BLADE SURG SAW STD S STL OSC W/ SERR EDGE DISP

## (undated) DEVICE — SOLUTION IRRIG 1000ML H2O PIC PLAS SHATTERPROOF CONTAINER

## (undated) DEVICE — KIT DRP FOR RIO ROBOTIC ARM ASST SYS

## (undated) DEVICE — SOLUTION IRRIG 3000ML 0.9% SOD CHL USP UROMATIC PLAS CONT

## (undated) DEVICE — SOLUTION IV 1000ML 0.9% SOD CHL PH 5 INJ USP VIAFLX PLAS

## (undated) DEVICE — SUTURE VICRYL + SZ 1-0 L36IN ABSRB UD CTX 1/2 CIR TAPR PNT VCP977H

## (undated) DEVICE — DRAPE,EXTREMITY,89X128,STERILE: Brand: MEDLINE

## (undated) DEVICE — PREP KIT PEEL PTCH POVIDONE IOD

## (undated) DEVICE — PIN BONE 3.2 X 140MM

## (undated) DEVICE — DRESSING ANTIMIC FOAM OPTIFOAM POSTOP ADH 4X14 IN

## (undated) DEVICE — TUBING, SUCTION, 1/4" X 12', STRAIGHT: Brand: MEDLINE

## (undated) DEVICE — BLADE, FLAT, 10 MM X 5 CM, SINGLE USE: Brand: SHUKLA BLADE S9BLADE

## (undated) DEVICE — Z DISCONTINUED NO SUB SUTURE CHROMIC GUT SZ 4-0 L18IN ABSRB BRN L13MM P-3 3/8 CIR 1654G

## (undated) DEVICE — HOOD, PEEL-AWAY: Brand: FLYTE

## (undated) DEVICE — GLOVE SURG SZ 75 L12IN FNGR THK94MIL STD WHT LTX FREE

## (undated) DEVICE — CHS T&A PACK: Brand: MEDLINE INDUSTRIES, INC.